# Patient Record
Sex: FEMALE | Race: WHITE | NOT HISPANIC OR LATINO | Employment: UNEMPLOYED | URBAN - METROPOLITAN AREA
[De-identification: names, ages, dates, MRNs, and addresses within clinical notes are randomized per-mention and may not be internally consistent; named-entity substitution may affect disease eponyms.]

---

## 2017-11-06 ENCOUNTER — HOSPITAL ENCOUNTER (OUTPATIENT)
Dept: RADIOLOGY | Facility: HOSPITAL | Age: 27
Discharge: HOME | End: 2017-11-06
Attending: INTERNAL MEDICINE
Payer: COMMERCIAL

## 2017-11-06 ENCOUNTER — PATIENT MESSAGE (OUTPATIENT)
Dept: RHEUMATOLOGY | Facility: CLINIC | Age: 27
End: 2017-11-06

## 2017-11-06 ENCOUNTER — OFFICE VISIT (OUTPATIENT)
Dept: RHEUMATOLOGY | Facility: CLINIC | Age: 27
End: 2017-11-06
Payer: COMMERCIAL

## 2017-11-06 VITALS
HEIGHT: 60 IN | SYSTOLIC BLOOD PRESSURE: 120 MMHG | BODY MASS INDEX: 29.86 KG/M2 | DIASTOLIC BLOOD PRESSURE: 80 MMHG | HEART RATE: 64 BPM | WEIGHT: 152.13 LBS

## 2017-11-06 DIAGNOSIS — M32.9 SYSTEMIC LUPUS ERYTHEMATOSUS, UNSPECIFIED SLE TYPE, UNSPECIFIED ORGAN INVOLVEMENT STATUS: Primary | ICD-10-CM

## 2017-11-06 DIAGNOSIS — M32.9 SYSTEMIC LUPUS ERYTHEMATOSUS, UNSPECIFIED SLE TYPE, UNSPECIFIED ORGAN INVOLVEMENT STATUS: ICD-10-CM

## 2017-11-06 PROCEDURE — 77077 JOINT SURVEY SINGLE VIEW: CPT | Mod: TC,PO

## 2017-11-06 PROCEDURE — 99205 OFFICE O/P NEW HI 60 MIN: CPT | Mod: S$GLB,,, | Performed by: INTERNAL MEDICINE

## 2017-11-06 PROCEDURE — 77077 JOINT SURVEY SINGLE VIEW: CPT | Mod: 26,,, | Performed by: RADIOLOGY

## 2017-11-06 PROCEDURE — 99999 PR PBB SHADOW E&M-EST. PATIENT-LVL III: CPT | Mod: PBBFAC,,, | Performed by: INTERNAL MEDICINE

## 2017-11-06 RX ORDER — HYDROXYCHLOROQUINE SULFATE 200 MG/1
400 TABLET, FILM COATED ORAL DAILY
Qty: 60 TABLET | Refills: 11 | Status: SHIPPED | OUTPATIENT
Start: 2017-11-06 | End: 2018-11-06

## 2017-11-06 NOTE — PROGRESS NOTES
Subjective:          Chief Complaint: Justine Melgoza is a 27 y.o. female who had concerns including Disease Management.    HPI:    She is a 27-year-old female she is past medical history for arthritis, lupus, migraines, hypothyroid she's had a surgery of the ankle right, knee right x3.  Subsequently had a BIO Replacement in 2013.  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 Hernandez in [place] is Ortho.     Previously seen by rheumatologist in [place]. Diagnosed 2013. Has been treated with hydroxychloroquine sinceDr. Alfredo in [place] about 2014.  Started with rashes, FUO. Joints are stiff 1st thing in the morning.   +PE unprovoked. +Livedo reticularis. +MTHFR gene but normal homocysteine. , negative APS with Rheum. No protein C and S, factor V abnormality  OBGYN-Dr. Baez Plan for Lovenox/heparin for any future pregnancy.   Dr. Owusu: Hematology      REVIEW OF SYSTEMS:    Review of Systems   Constitutional: Positive for malaise/fatigue. Negative for fever and weight loss.   HENT: Negative for sore throat.    Eyes: Negative for double vision, photophobia and redness.   Respiratory: Negative for cough, shortness of breath and wheezing.    Cardiovascular: Negative for chest pain, palpitations and orthopnea.   Gastrointestinal: Negative for abdominal pain, constipation and diarrhea.   Genitourinary: Negative for dysuria, hematuria and urgency.   Musculoskeletal: Positive for joint pain and myalgias. Negative for back pain.   Skin: Negative for rash.   Neurological: Negative for dizziness, tingling, focal weakness and headaches.   Endo/Heme/Allergies: Does not bruise/bleed easily.   Psychiatric/Behavioral: Negative for depression, hallucinations and suicidal ideas.               Objective:            Past Medical History:   Diagnosis Date    Arthritis     rhuematoid arthritis    Asthma     Hypertension     Lupus     Migraine headache     Thyroid disease     hypothyroid      Family History   Problem Relation Age of Onset    Diabetes Mother     Ovarian cancer Mother     Fibromyalgia Mother     Heart disease Father     Diabetes Father     Heart attack Father     Hyperlipidemia Father      Social History   Substance Use Topics    Smoking status: Never Smoker    Smokeless tobacco: Never Used    Alcohol use No         Current Outpatient Prescriptions on File Prior to Visit   Medication Sig Dispense Refill    albuterol-ipratropium 2.5mg-0.5mg/3mL (DUO-NEB) 0.5 mg-3 mg(2.5 mg base)/3 mL nebulizer solution Take 3 mLs by nebulization every 6 (six) hours as needed for Wheezing. Rescue 1 Box 0    aspirin (ECOTRIN) 81 MG EC tablet Take 81 mg by mouth once daily.       budesonide-formoterol 80-4.5 mcg (SYMBICORT) 80-4.5 mcg/actuation HFAA Inhale 2 puffs into the lungs once daily.       cholecalciferol, vitamin D3, 5,000 unit Tab Take 5,000 Units by mouth.       cyclobenzaprine (FLEXERIL) 10 MG tablet TAKE 1/2 TO 1 TABLET AT BEDTIME AS NEEDED FOR MUSCLE SPASM  1    fluticasone (FLONASE) 50 mcg/actuation nasal spray 1 spray by Each Nare route as needed.       hydroxychloroquine (PLAQUENIL) 200 mg tablet Take 400 mg by mouth once daily.       ketorolac (TORADOL) 10 mg tablet Take 1 tablet (10 mg total) by mouth every 6 (six) hours. 30 tablet 0    ondansetron (ZOFRAN-ODT) 4 MG TbDL DISSOLVE 1 TABLET (4 MG TOTAL) UNDER THE TONGUE EVERY 8 (EIGHT) HOURS AS NEEDED. 12 tablet 0    pantoprazole (PROTONIX) 40 MG tablet Take 1 tablet (40 mg total) by mouth once daily. 30 tablet 11    sucralfate (CARAFATE) 100 mg/mL suspension TAKE 10 MLS (1 GRAM TOTAL) BY MOUTH 4 (FOUR) TIMES DAILY WITH MEALS AND NIGHTLY. 414 mL 0    traMADol (ULTRAM) 50 mg tablet Take 1 tablet (50 mg total) by mouth every 6 (six) hours as needed. 60 tablet 0    [DISCONTINUED] ondansetron (ZOFRAN-ODT) 4 MG TbDL Take 1 tablet (4 mg total) by mouth every 6 (six) hours as needed. 12 tablet 0    [DISCONTINUED] SYNTHROID  50 mcg tablet TAKE 1 TABLET BY MOUTH EVERY MORNING BEFORE BREAKFAST 30 tablet 2     No current facility-administered medications on file prior to visit.        Vitals:    11/06/17 1139   BP: 120/80   Pulse: 64       Physical Exam:    Physical Exam   Constitutional: She appears well-developed and well-nourished.   HENT:   Nose: No septal deviation.   Mouth/Throat: Mucous membranes are normal. No oral lesions.   Eyes: Pupils are equal, round, and reactive to light. Right conjunctiva is not injected. Left conjunctiva is not injected.   Neck: No JVD present. No thyroid mass and no thyromegaly present.   Cardiovascular: Normal rate, regular rhythm and normal pulses.    No edema   Pulmonary/Chest: Effort normal and breath sounds normal.   Abdominal: Soft. Normal appearance. There is no hepatosplenomegaly.   Musculoskeletal:        Right shoulder: She exhibits normal range of motion, no tenderness and no swelling.        Left shoulder: She exhibits normal range of motion, no tenderness and no swelling.        Right elbow: She exhibits normal range of motion and no swelling. No tenderness found.        Left elbow: She exhibits normal range of motion and no swelling. No tenderness found.        Right wrist: She exhibits normal range of motion, no tenderness and no swelling.        Left wrist: She exhibits normal range of motion, no tenderness and no swelling.        Right hip: She exhibits normal range of motion, normal strength and no swelling.        Left hip: She exhibits normal range of motion, no tenderness and no swelling.        Right knee: She exhibits normal range of motion and no swelling. Tenderness found. Medial joint line tenderness noted.        Left knee: She exhibits decreased range of motion and swelling. Tenderness found. Medial joint line tenderness noted.        Right ankle: She exhibits normal range of motion and no swelling. No tenderness.        Left ankle: She exhibits normal range of motion and no  swelling. No tenderness.        Right hand: She exhibits decreased range of motion and tenderness. She exhibits no swelling.        Left hand: She exhibits decreased range of motion and tenderness. She exhibits no swelling.        Right foot: There is tenderness and swelling.        Left foot: There is tenderness and swelling.   5/5 upper and lower extremity bilateral muscle strength   Lymphadenopathy:     She has no cervical adenopathy.     She has no axillary adenopathy.   Neurological: She has normal strength and normal reflexes.   Skin: Skin is dry and intact.   Psychiatric: She has a normal mood and affect.             Assessment:       Encounter Diagnosis   Name Primary?    Systemic lupus erythematosus, unspecified SLE type, unspecified organ involvement status Yes          Plan:        Systemic lupus erythematosus, unspecified SLE type, unspecified organ involvement status  -     MADYSON; Future; Expected date: 11/06/2017  -     Anti Sm/RNP Antibody; Future; Expected date: 11/06/2017  -     Anti-DNA antibody, double-stranded; Future; Expected date: 11/06/2017  -     Anti-Histone Antibody; Future; Expected date: 11/06/2017  -     Anti-Smith antibody; Future; Expected date: 11/06/2017  -     C3 complement; Future; Expected date: 11/06/2017  -     C4 complement; Future; Expected date: 11/06/2017  -     Complement, total; Future; Expected date: 11/06/2017  -     Sjogrens syndrome-A extractable nuclear antibody; Future; Expected date: 11/06/2017  -     Sjogrens syndrome-B extractable nuclear antibody; Future; Expected date: 11/06/2017  -     Cardiolipin antibody; Future; Expected date: 11/06/2017  -     Beta-2 glycoprotein antibodies; Future; Expected date: 11/20/2017      Patient with hx of UCTD/SLE, +rash and joint swelling. Stable on HCQ 400mg in AM no maculopathy. +livedo, hx of malar rash, no nephritis.    -continue HCQ   -Update serologies.    -UTD on eye exams.      Discussed the risks benefits and side  effects of hydroxychloroquine including but not limited to retinal toxicity, rashes, nausea.  Patient is aware of the monitoring requirements of an annual eye exam will have this done following a trial to see if  tolerates the medication.    Return in about 3 months (around 2/6/2018).      Thank you for allowing me to participate in the care of this very pleasant patient.    60min consultation with greater than 50% spent in counseling, chart review and coordination of care. All questions answered.

## 2017-12-29 PROBLEM — N83.292 COMPLEX CYST OF LEFT OVARY: Status: ACTIVE | Noted: 2017-12-29

## 2018-07-31 PROBLEM — N80.9 DEEP INFILTRATIVE ENDOMETRIOSIS: Status: ACTIVE | Noted: 2018-07-31

## 2018-07-31 PROBLEM — N80.129 ENDOMETRIOMA OF OVARY: Status: ACTIVE | Noted: 2018-07-31

## 2019-02-07 ENCOUNTER — OFFICE VISIT (OUTPATIENT)
Dept: RHEUMATOLOGY | Facility: CLINIC | Age: 29
End: 2019-02-07
Payer: COMMERCIAL

## 2019-02-07 VITALS
DIASTOLIC BLOOD PRESSURE: 80 MMHG | BODY MASS INDEX: 30.76 KG/M2 | HEART RATE: 64 BPM | HEIGHT: 61 IN | SYSTOLIC BLOOD PRESSURE: 120 MMHG | WEIGHT: 162.94 LBS

## 2019-02-07 DIAGNOSIS — I26.99 PULMONARY THROMBOEMBOLISM: ICD-10-CM

## 2019-02-07 DIAGNOSIS — M32.19 SYSTEMIC LUPUS ERYTHEMATOSUS WITH OTHER ORGAN INVOLVEMENT, UNSPECIFIED SLE TYPE: Primary | ICD-10-CM

## 2019-02-07 DIAGNOSIS — M79.7 FIBROMYALGIA: ICD-10-CM

## 2019-02-07 PROCEDURE — 99215 OFFICE O/P EST HI 40 MIN: CPT | Mod: S$GLB,,, | Performed by: INTERNAL MEDICINE

## 2019-02-07 PROCEDURE — 99999 PR PBB SHADOW E&M-EST. PATIENT-LVL III: CPT | Mod: PBBFAC,,, | Performed by: INTERNAL MEDICINE

## 2019-02-07 PROCEDURE — 99215 PR OFFICE/OUTPT VISIT, EST, LEVL V, 40-54 MIN: ICD-10-PCS | Mod: S$GLB,,, | Performed by: INTERNAL MEDICINE

## 2019-02-07 PROCEDURE — 99999 PR PBB SHADOW E&M-EST. PATIENT-LVL III: ICD-10-PCS | Mod: PBBFAC,,, | Performed by: INTERNAL MEDICINE

## 2019-02-07 RX ORDER — HYDROXYCHLOROQUINE SULFATE 200 MG/1
200 TABLET, FILM COATED ORAL 2 TIMES DAILY
Qty: 60 TABLET | Refills: 11 | Status: SHIPPED | OUTPATIENT
Start: 2019-02-07 | End: 2020-02-07

## 2019-02-07 RX ORDER — PREGABALIN 25 MG/1
25 CAPSULE ORAL 2 TIMES DAILY
Qty: 60 CAPSULE | Refills: 6 | Status: SHIPPED | OUTPATIENT
Start: 2019-02-07 | End: 2020-08-07

## 2019-02-07 RX ORDER — HYDROXYCHLOROQUINE SULFATE 200 MG/1
200 TABLET, FILM COATED ORAL DAILY
COMMUNITY
End: 2019-02-07 | Stop reason: SDUPTHER

## 2019-02-07 NOTE — PROGRESS NOTES
Subjective:          Chief Complaint: Justine Akins is a 28 y.o. female who had concerns including Disease Management.    HPI:  Interval events:  Patient was lost to followup last visit with me was in 2017 nearly year and a few months.  She continues with Plaquenil once daily.  She has undergone I various surgeries for endometrial ablation in endometriosis she has had unilateral oophorectomy with Dr. Leon.  She is having trouble with her prior left knee replacement following with Dr. Ponce.   She has been on Lupron monthly for 3 months now with widespread MSK pain, myalgias.   Generalized body aches, cervicalgia, and arthralgias  No swelling that she can appreciate.   No rashes. Patient denies weight loss, rashes, dry eye, dry mouth, nasal or palatal ulcerations,  lymphadenopathy, Raynaud's, hx of DVT/miscarriages, psoriasis or family hx of psoriasis, rashes, serositis, anemia or other constitutional symptoms.          Given her history of a pulmonary thromboembolism she was referred to heme on for any possibilities of starting Lupron.  She does have a history of the MTHFR mutation.  Given this history she was started on a low-dose of Eliquis at 2.5 mg daily or Lovenox for Lupron therapy. Doing Lovenox. Now.   She is a 28-year-old female she is past medical history for arthritis, lupus, migraines, hypothyroid she's had a surgery of the ankle right, knee right x3.  Subsequently had a BIO Replacement in 2013.  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 Hernandez in [place] is Ortho.     Previously seen by rheumatologist in [place]. Diagnosed 2013. Has been treated with hydroxychloroquine sinceDr. Alfredo in [place] about 2014.  Started with rashes, FUO. Joints are stiff 1st thing in the morning.   +PE unprovoked. +Livedo reticularis. +MTHFR gene but normal homocysteine. , negative APS with Rheum. No protein C and S, factor V abnormality  OBGYN-Dr. Baez Plan for  Lovenox/heparin for any future pregnancy.         REVIEW OF SYSTEMS:    Review of Systems   Constitutional: Positive for malaise/fatigue. Negative for fever and weight loss.   HENT: Negative for sore throat.    Eyes: Negative for double vision, photophobia and redness.   Respiratory: Negative for cough, shortness of breath and wheezing.    Cardiovascular: Negative for chest pain, palpitations and orthopnea.   Gastrointestinal: Negative for abdominal pain, constipation and diarrhea.   Genitourinary: Negative for dysuria, hematuria and urgency.   Musculoskeletal: Positive for joint pain and myalgias. Negative for back pain.   Skin: Negative for rash.   Neurological: Negative for dizziness, tingling, focal weakness and headaches.   Endo/Heme/Allergies: Does not bruise/bleed easily.   Psychiatric/Behavioral: Negative for depression, hallucinations and suicidal ideas.               Objective:            Past Medical History:   Diagnosis Date    Anesthesia complication     pt states she needs a pediatric ET tube used due to past airway complication (trauma from tube)    Arthritis     rhuematoid arthritis, osteoarthritis    Asthma     Chronic nausea     GERD (gastroesophageal reflux disease)     History of kidney stones     Hypertension     Lupus     Migraine headache     MTHFR mutation     Ovarian cyst     Panic attack as reaction to stress     Pulmonary embolism 2015    Respiratory distress     Thyroid disease     hypothyroid     Family History   Problem Relation Age of Onset    Diabetes Mother     Ovarian cancer Mother     Fibromyalgia Mother     Heart disease Father     Diabetes Father     Heart attack Father     Hyperlipidemia Father      Social History     Tobacco Use    Smoking status: Never Smoker    Smokeless tobacco: Never Used   Substance Use Topics    Alcohol use: No    Drug use: No         Current Outpatient Medications on File Prior to Visit   Medication Sig Dispense Refill     budesonide (PULMICORT) 0.5 mg/2 mL nebulizer solution Take 2 mLs (0.5 mg total) by nebulization once daily. Controller  0    cholecalciferol, vitamin D3, 5,000 unit Tab Take 5,000 Units by mouth once daily.       cyclobenzaprine (FLEXERIL) 10 MG tablet TAKE 1/2 TO 1 TABLET AT BEDTIME AS NEEDED FOR MUSCLE SPASM 30 tablet 1    fish oil-omega-3 fatty acids 300-1,000 mg capsule Take by mouth once daily.      fluticasone (FLONASE) 50 mcg/actuation nasal spray 1 spray (50 mcg total) by Each Nare route as needed. 16 g 0    hydroxychloroquine (PLAQUENIL) 200 mg tablet Take 200 mg by mouth once daily.      LUPRON DEPOT 3.75 mg injection inject INTRA-MUSCULARLY once a month  0    oxyCODONE-acetaminophen (PERCOCET) 5-325 mg per tablet Take 1 tablet by mouth every 8 (eight) hours as needed for Pain. 9 tablet 0    prenatal64-iron-Lmfolate-algal (NEEVODHA, WITH ALGAL OIL,) 27 mg iron-1.13 mg-581.92 mg Cap Take 1 capsule by mouth Daily. 90 capsule 3    SYMBICORT 160-4.5 mcg/actuation HFAA INHALE 2 PUFFS INTO THE LUNGS 2 TIMES DAILY. 10.2 Inhaler 2    SYNTHROID 50 mcg tablet Take 1 tablet (50 mcg total) by mouth every morning. Needs thyroid labs checked 30 tablet 0    albuterol-ipratropium (DUO-NEB) 2.5 mg-0.5 mg/3 mL nebulizer solution Take 3 mLs by nebulization every 6 (six) hours as needed for Wheezing. Rescue 1 Box 2    CARAFATE 100 mg/mL suspension TAKE 10 MLS (1 GRAM TOTAL) BY MOUTH 4 (FOUR) TIMES DAILY WITH MEALS AND NIGHTLY. (Patient taking differently: TAKE 10 MLS (1 GRAM TOTAL) BY MOUTH 4 (FOUR) TIMES DAILY WITH MEALS AND NIGHTLY. PRN) 414 mL 0    hydroCHLOROthiazide (HYDRODIURIL) 25 MG tablet TAKE 1 TABLET (25 MG TOTAL) BY MOUTH ONCE DAILY. (Patient taking differently: Take 25 mg by mouth daily as needed. ) 30 tablet 11    ipratropium (ATROVENT) 42 mcg (0.06 %) nasal spray 2 sprays by Nasal route 3 (three) times daily. 15 mL 0    ondansetron (ZOFRAN-ODT) 4 MG TbDL DISSOLVE 1 TABLET (4 MG TOTAL) UNDER THE  TONGUE EVERY 8 (EIGHT) HOURS AS NEEDED. 12 tablet 0    pantoprazole (PROTONIX) 40 MG tablet Take 1 tablet (40 mg total) by mouth once daily. (Patient taking differently: Take 40 mg by mouth daily as needed. ) 30 tablet 11    traMADol (ULTRAM) 50 mg tablet Take 50 mg by mouth every 6 (six) hours.  0    [DISCONTINUED] aspirin (ECOTRIN) 81 MG EC tablet Take 81 mg by mouth once daily.       [DISCONTINUED] ibuprofen (ADVIL,MOTRIN) 800 MG tablet 1 TABLET BY MOUTH EVERY 8 HOURS AS NEEDED PAIN  0     No current facility-administered medications on file prior to visit.        Vitals:    02/07/19 1544   BP: 120/80   Pulse: 64       Physical Exam:    Physical Exam   Constitutional: She appears well-developed and well-nourished.   HENT:   Nose: No septal deviation.   Mouth/Throat: Mucous membranes are normal. No oral lesions.   Eyes: Pupils are equal, round, and reactive to light. Right conjunctiva is not injected. Left conjunctiva is not injected.   Neck: No JVD present. No thyroid mass and no thyromegaly present.   Cardiovascular: Normal rate, regular rhythm and normal pulses.   No edema   Pulmonary/Chest: Effort normal and breath sounds normal.   Abdominal: Soft. Normal appearance. There is no hepatosplenomegaly.   Musculoskeletal:        Right shoulder: She exhibits normal range of motion, no tenderness and no swelling.        Left shoulder: She exhibits normal range of motion, no tenderness and no swelling.        Right elbow: She exhibits normal range of motion and no swelling. No tenderness found.        Left elbow: She exhibits normal range of motion and no swelling. No tenderness found.        Right wrist: She exhibits normal range of motion, no tenderness and no swelling.        Left wrist: She exhibits normal range of motion, no tenderness and no swelling.        Right hip: She exhibits normal range of motion, normal strength and no swelling.        Left hip: She exhibits normal range of motion, no tenderness and  no swelling.        Right knee: She exhibits normal range of motion and no swelling. Tenderness found. Medial joint line tenderness noted.        Left knee: She exhibits decreased range of motion and swelling. Tenderness found. Medial joint line tenderness noted.        Right ankle: She exhibits normal range of motion and no swelling. No tenderness.        Left ankle: She exhibits normal range of motion and no swelling. No tenderness.        Right hand: She exhibits decreased range of motion and tenderness. She exhibits no swelling.        Left hand: She exhibits decreased range of motion and tenderness. She exhibits no swelling.        Right foot: There is tenderness and swelling.        Left foot: There is tenderness and swelling.   5/5 upper and lower extremity bilateral muscle strength   Lymphadenopathy:     She has no cervical adenopathy.     She has no axillary adenopathy.   Neurological: She has normal strength and normal reflexes.   Skin: Skin is dry and intact.   Psychiatric: She has a normal mood and affect.             Assessment:       Encounter Diagnoses   Name Primary?    Systemic lupus erythematosus with other organ involvement, unspecified SLE type Yes    Fibromyalgia     Pulmonary thromboembolism           Plan:        Systemic lupus erythematosus with other organ involvement, unspecified SLE type  -     pregabalin (LYRICA) 25 MG capsule; Take 1 capsule (25 mg total) by mouth 2 (two) times daily.  Dispense: 60 capsule; Refill: 6  -     hydroxychloroquine (PLAQUENIL) 200 mg tablet; Take 1 tablet (200 mg total) by mouth 2 (two) times daily.  Dispense: 60 tablet; Refill: 11  -     MADYSON Screen w/Reflex; Future; Expected date: 02/07/2019  -     Anti Sm/RNP Antibody; Future; Expected date: 02/07/2019  -     Anti-DNA antibody, double-stranded; Future; Expected date: 02/07/2019  -     Anti-Histone Antibody; Future; Expected date: 02/07/2019  -     Anti-Smith antibody; Future; Expected date: 02/07/2019  -      C3 complement; Future; Expected date: 02/07/2019  -     C4 complement; Future; Expected date: 02/07/2019  -     Sjogrens syndrome-A extractable nuclear antibody; Future; Expected date: 02/07/2019  -     Sjogrens syndrome-B extractable nuclear antibody; Future; Expected date: 02/07/2019  -     Complement, total; Future; Expected date: 02/07/2019  -     Sedimentation rate; Standing; Expected date: 02/07/2019  -     C-reactive protein; Standing; Expected date: 02/07/2019    Fibromyalgia  -     pregabalin (LYRICA) 25 MG capsule; Take 1 capsule (25 mg total) by mouth 2 (two) times daily.  Dispense: 60 capsule; Refill: 6  -     hydroxychloroquine (PLAQUENIL) 200 mg tablet; Take 1 tablet (200 mg total) by mouth 2 (two) times daily.  Dispense: 60 tablet; Refill: 11    Pulmonary thromboembolism      Patient with hx of UCTD/SLE, +rash and joint swelling. Stable on HCQ 400mg in AM no maculopathy. +livedo, hx of malar rash, no nephritis.    -continue HCQ   -Update serologies.    -UTD on eye exams.    She may be having Lupron induced arthralgias and myalgias discussed trial with Lyrica while on therapy.     Likely EDS type 1/benign joint hypermobility: Reji 8/9       Discussed the risks benefits and side effects of hydroxychloroquine including but not limited to retinal toxicity, rashes, nausea.  Patient is aware of the monitoring requirements of an annual eye exam will have this done following a trial to see if  tolerates the medication.    Follow-up in about 4 months (around 6/7/2019).      Thank you for allowing me to participate in the care of this very pleasant patient.    45min consultation with greater than 50% spent in counseling, chart review and coordination of care. All questions answered.

## 2019-07-01 ENCOUNTER — OFFICE VISIT (OUTPATIENT)
Dept: RHEUMATOLOGY | Facility: CLINIC | Age: 29
End: 2019-07-01
Payer: COMMERCIAL

## 2019-07-01 VITALS
BODY MASS INDEX: 30.88 KG/M2 | SYSTOLIC BLOOD PRESSURE: 132 MMHG | HEIGHT: 61 IN | WEIGHT: 163.56 LBS | DIASTOLIC BLOOD PRESSURE: 93 MMHG | HEART RATE: 104 BPM

## 2019-07-01 DIAGNOSIS — M32.19 SYSTEMIC LUPUS ERYTHEMATOSUS WITH OTHER ORGAN INVOLVEMENT, UNSPECIFIED SLE TYPE: Primary | ICD-10-CM

## 2019-07-01 DIAGNOSIS — M79.7 FIBROMYALGIA: ICD-10-CM

## 2019-07-01 PROCEDURE — 99999 PR PBB SHADOW E&M-EST. PATIENT-LVL III: CPT | Mod: PBBFAC,,, | Performed by: INTERNAL MEDICINE

## 2019-07-01 PROCEDURE — 99999 PR PBB SHADOW E&M-EST. PATIENT-LVL III: ICD-10-PCS | Mod: PBBFAC,,, | Performed by: INTERNAL MEDICINE

## 2019-07-01 PROCEDURE — 99214 OFFICE O/P EST MOD 30 MIN: CPT | Mod: S$GLB,,, | Performed by: INTERNAL MEDICINE

## 2019-07-01 PROCEDURE — 99214 PR OFFICE/OUTPT VISIT, EST, LEVL IV, 30-39 MIN: ICD-10-PCS | Mod: S$GLB,,, | Performed by: INTERNAL MEDICINE

## 2019-07-01 ASSESSMENT — ROUTINE ASSESSMENT OF PATIENT INDEX DATA (RAPID3)
PAIN SCORE: 5.5
PATIENT GLOBAL ASSESSMENT SCORE: 4.5
PSYCHOLOGICAL DISTRESS SCORE: 4.4
MDHAQ FUNCTION SCORE: .8
TOTAL RAPID3 SCORE: 4.22

## 2019-07-01 NOTE — PROGRESS NOTES
Subjective:          Chief Complaint: Justine Akins is a 28 y.o. female who had concerns including Lupus (4 month).    HPI:  Interval events:  Patient was lost to followup, now here at her 4 month follow-up from [facility].    2/18/19 She continues with Plaquenil twice daily.    She has undergone  various surgeries for endometrial ablation in endometriosis she has had unilateral oophorectomy with Dr. Leon.   Patient having epigastric pain.    She is having trouble with her prior left knee replacement following with Dr. Ponce.   She has been on Lupron monthly for 6 months now with widespread MSK pain, myalgias.   Generalized body aches, cervicalgia, and arthralgias  No swelling that she can appreciate.   No rashes. Patient denies weight loss, rashes, dry eye, dry mouth, nasal or palatal ulcerations,  lymphadenopathy, Raynaud's, hx of DVT/miscarriages, psoriasis or family hx of psoriasis, rashes, serositis, anemia or other constitutional symptoms.      Given her history of a pulmonary thromboembolism she was referred to heme on for any possibilities of starting Lupron.  She does have a history of the MTHFR mutation.  Given this history she was started on a low-dose of Eliquis at 2.5 mg daily or Lovenox for Lupron therapy. Doing Lovenox. Now.   She is a 28-year-old female she is past medical history for arthritis, lupus, migraines, hypothyroid she's had a surgery of the ankle right, knee right x3.  Subsequently had a BIO Replacement in 2013.  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 Hernandez in [place] is Ortho.     Previously seen by rheumatologist in [place]. Diagnosed 2013. Has been treated with hydroxychloroquine sinceDr. Alfredo in [place] about 2014.  Started with rashes, FUO. Joints are stiff 1st thing in the morning.   +PE unprovoked. +Livedo reticularis. +MTHFR gene but normal homocysteine. , negative APS with Rheum. No protein C and S, factor V  abnormality  OBGYROSEANNA-Dr. Baez Plan for Lovenox/heparin for any future pregnancy.     Component      Latest Ref Rng & Units 2019           1:55 PM  1:55 PM   Zamora (YVONNE) Ab, IgG      0 - 40 AU/mL 0 0   RNP Antibody      0 - 40 AU/mL  1   SSA Antibody      0 - 40 AU/mL  7   SSA 60 (Ro) YVONNE Antibody      0 - 40 AU/mL  1   MADYSON Screen      None Detected  None Detected   Anti-Histone Antibody      0.0 - 0.9 Units  0.2   Complement (C-3)      50 - 180 mg/dL  138   Complement (C-4)      11 - 44 mg/dL  35   SSB Antibody      0 - 40 AU/mL  0   Compl, Total (CH50)      60 - 144  Units  108   Sed Rate      0 - 19 mm/Hr  12   CRP      0.00 - 0.90 mg/dL  1.00 (H)   Anti-dsDNA Ab by IFA      <1:10  <1:10     REVIEW OF SYSTEMS:    Review of Systems   Constitutional: Positive for malaise/fatigue. Negative for fever and weight loss.   HENT: Negative for sore throat.    Eyes: Negative for double vision, photophobia and redness.   Respiratory: Negative for cough, shortness of breath and wheezing.    Cardiovascular: Negative for chest pain, palpitations and orthopnea.   Gastrointestinal: Negative for abdominal pain, constipation and diarrhea.   Genitourinary: Negative for dysuria, hematuria and urgency.   Musculoskeletal: Positive for joint pain and myalgias. Negative for back pain.   Skin: Negative for rash.   Neurological: Negative for dizziness, tingling, focal weakness and headaches.   Endo/Heme/Allergies: Does not bruise/bleed easily.   Psychiatric/Behavioral: Negative for depression, hallucinations and suicidal ideas.               Objective:            Past Medical History:   Diagnosis Date    Anesthesia complication     pt states she needs a pediatric ET tube used due to past airway complication (trauma from tube)    Arthritis     rhuematoid arthritis, osteoarthritis    Asthma     Chronic nausea     GERD (gastroesophageal reflux disease)     History of kidney stones     Hypertension     Lupus      Migraine headache     MTHFR mutation     Ovarian cyst     Panic attack as reaction to stress     Pulmonary embolism 2015    Respiratory distress     Thyroid disease     hypothyroid     Family History   Problem Relation Age of Onset    Diabetes Mother     Ovarian cancer Mother     Fibromyalgia Mother     Heart disease Father     Diabetes Father     Heart attack Father     Hyperlipidemia Father      Social History     Tobacco Use    Smoking status: Never Smoker    Smokeless tobacco: Never Used   Substance Use Topics    Alcohol use: No    Drug use: No         Current Outpatient Medications on File Prior to Visit   Medication Sig Dispense Refill    albuterol-ipratropium (DUO-NEB) 2.5 mg-0.5 mg/3 mL nebulizer solution Take 3 mLs by nebulization every 6 (six) hours as needed for Wheezing. Rescue 1 Box 2    budesonide (PULMICORT) 0.5 mg/2 mL nebulizer solution Take 2 mLs (0.5 mg total) by nebulization once daily. Controller  0    cholecalciferol, vitamin D3, 5,000 unit Tab Take 5,000 Units by mouth once daily.       cyclobenzaprine (FLEXERIL) 10 MG tablet TAKE 1/2 TO 1 TABLET AT BEDTIME AS NEEDED FOR MUSCLE SPASM 30 tablet 1    enoxaparin (LOVENOX) 150 mg/mL Syrg Inject 150 mg into the skin once daily.      fexofenadine-pseudoephedrine  mg (ALLEGRA-D)  mg per tablet Take 1 tablet by mouth 2 (two) times daily. for 15 days 30 tablet 0    fish oil-omega-3 fatty acids 300-1,000 mg capsule Take by mouth once daily.      fluticasone (FLONASE) 50 mcg/actuation nasal spray 1 spray (50 mcg total) by Each Nare route as needed. 16 g 0    hydroxychloroquine (PLAQUENIL) 200 mg tablet Take 1 tablet (200 mg total) by mouth 2 (two) times daily. 60 tablet 11    ipratropium (ATROVENT) 42 mcg (0.06 %) nasal spray 2 sprays by Nasal route 3 (three) times daily. 15 mL 0    LUPRON DEPOT 3.75 mg injection inject INTRA-MUSCULARLY once a month  0    ondansetron (ZOFRAN-ODT) 4 MG TbDL DISSOLVE 1 TABLET (4 MG  TOTAL) UNDER THE TONGUE EVERY 8 (EIGHT) HOURS AS NEEDED. 12 tablet 0    oxyCODONE-acetaminophen (PERCOCET) 5-325 mg per tablet Take 1 tablet by mouth every 8 (eight) hours as needed for Pain. 9 tablet 0    pregabalin (LYRICA) 25 MG capsule Take 1 capsule (25 mg total) by mouth 2 (two) times daily. 60 capsule 6    prenatal64-iron-Lmfolate-algal (NEEVODHA, WITH ALGAL OIL,) 27 mg iron-1.13 mg-581.92 mg Cap Take 1 capsule by mouth Daily. 90 capsule 3    SYMBICORT 160-4.5 mcg/actuation HFAA INHALE 2 PUFFS INTO THE LUNGS 2 TIMES DAILY. 10.2 Inhaler 2    SYNTHROID 50 mcg tablet TAKE 1 TABLET (50 MCG TOTAL) BY MOUTH EVERY MORNING. NEEDS THYROID LABS CHECKED 30 tablet 2    traMADol (ULTRAM) 50 mg tablet Take 50 mg by mouth every 6 (six) hours.  0    pantoprazole (PROTONIX) 40 MG tablet Take 1 tablet (40 mg total) by mouth once daily. (Patient taking differently: Take 40 mg by mouth daily as needed. ) 30 tablet 11    [DISCONTINUED] CARAFATE 100 mg/mL suspension TAKE 10 MLS (1 GRAM TOTAL) BY MOUTH 4 (FOUR) TIMES DAILY WITH MEALS AND NIGHTLY. (Patient taking differently: TAKE 10 MLS (1 GRAM TOTAL) BY MOUTH 4 (FOUR) TIMES DAILY WITH MEALS AND NIGHTLY. PRN) 414 mL 0     No current facility-administered medications on file prior to visit.        Vitals:    07/01/19 1015   BP: (!) 132/93   Pulse: 104       Physical Exam:    Physical Exam   Constitutional: She appears well-developed and well-nourished.   HENT:   Nose: No septal deviation.   Mouth/Throat: Mucous membranes are normal. No oral lesions.   Eyes: Pupils are equal, round, and reactive to light. Right conjunctiva is not injected. Left conjunctiva is not injected.   Neck: No JVD present. No thyroid mass and no thyromegaly present.   Cardiovascular: Normal rate, regular rhythm and normal pulses.   No edema   Pulmonary/Chest: Effort normal and breath sounds normal.   Abdominal: Soft. Normal appearance. There is no hepatosplenomegaly.   Musculoskeletal:        Right  shoulder: She exhibits normal range of motion, no tenderness and no swelling.        Left shoulder: She exhibits normal range of motion, no tenderness and no swelling.        Right elbow: She exhibits normal range of motion and no swelling. No tenderness found.        Left elbow: She exhibits normal range of motion and no swelling. No tenderness found.        Right wrist: She exhibits normal range of motion, no tenderness and no swelling.        Left wrist: She exhibits normal range of motion, no tenderness and no swelling.        Right hip: She exhibits normal range of motion, normal strength and no swelling.        Left hip: She exhibits normal range of motion, no tenderness and no swelling.        Right knee: She exhibits normal range of motion and no swelling. Tenderness found. Medial joint line tenderness noted.        Left knee: She exhibits decreased range of motion and swelling. Tenderness found. Medial joint line tenderness noted.        Right ankle: She exhibits normal range of motion and no swelling. No tenderness.        Left ankle: She exhibits normal range of motion and no swelling. No tenderness.        Right hand: She exhibits decreased range of motion and tenderness. She exhibits no swelling.        Left hand: She exhibits decreased range of motion and tenderness. She exhibits no swelling.        Right foot: There is tenderness and swelling.        Left foot: There is tenderness and swelling.   5/5 upper and lower extremity bilateral muscle strength   Lymphadenopathy:     She has no cervical adenopathy.     She has no axillary adenopathy.   Neurological: She has normal strength and normal reflexes.   Skin: Skin is dry and intact.   Psychiatric: She has a normal mood and affect.             Assessment:       Encounter Diagnoses   Name Primary?    Systemic lupus erythematosus with other organ involvement, unspecified SLE type Yes    Fibromyalgia           Plan:        Systemic lupus erythematosus  with other organ involvement, unspecified SLE type  -     C-reactive protein; Future; Expected date: 07/01/2019  -     C4 complement; Future; Expected date: 07/01/2019  -     Anti-DNA antibody, double-stranded; Future; Expected date: 07/01/2019  -     C3 complement; Future; Expected date: 07/01/2019  -     Sedimentation rate; Future; Expected date: 07/01/2019  -     CBC auto differential; Future; Expected date: 07/01/2019  -     Comprehensive metabolic panel; Future; Expected date: 07/01/2019    Fibromyalgia      Patient with hx of UCTD/SLE, +rash and joint swelling. Stable on HCQ 400mg in AM no maculopathy. +livedo, hx of malar rash, no nephritis.    -continue HCQ   -Update serologies.    -UTD on eye exams.    She may be having Lupron induced arthralgias and myalgias   Continue Lyrica 25mg BID.     Likely EDS type 1/benign joint hypermobility: [place] 8/9       Discussed the risks benefits and side effects of hydroxychloroquine including but not limited to retinal toxicity, rashes, nausea.  Patient is aware of the monitoring requirements of an annual eye exam will have this done following a trial to see if  tolerates the medication.    Follow up in about 4 months (around 11/1/2019).      Thank you for allowing me to participate in the care of this very pleasant patient.    45min consultation with greater than 50% spent in counseling, chart review and coordination of care. All questions answered.

## 2019-11-25 ENCOUNTER — OFFICE VISIT (OUTPATIENT)
Dept: RHEUMATOLOGY | Facility: CLINIC | Age: 29
End: 2019-11-25
Payer: COMMERCIAL

## 2019-11-25 ENCOUNTER — TELEPHONE (OUTPATIENT)
Dept: RHEUMATOLOGY | Facility: CLINIC | Age: 29
End: 2019-11-25

## 2019-11-25 VITALS
SYSTOLIC BLOOD PRESSURE: 123 MMHG | WEIGHT: 164.13 LBS | HEART RATE: 79 BPM | DIASTOLIC BLOOD PRESSURE: 80 MMHG | BODY MASS INDEX: 30.99 KG/M2 | HEIGHT: 61 IN

## 2019-11-25 DIAGNOSIS — M79.7 FIBROMYALGIA: ICD-10-CM

## 2019-11-25 DIAGNOSIS — M53.3 SACROILIAC JOINT PAIN: ICD-10-CM

## 2019-11-25 DIAGNOSIS — M54.16 LUMBAR RADICULITIS: ICD-10-CM

## 2019-11-25 DIAGNOSIS — I26.99 PULMONARY THROMBOEMBOLISM: ICD-10-CM

## 2019-11-25 DIAGNOSIS — R53.83 FATIGUE, UNSPECIFIED TYPE: ICD-10-CM

## 2019-11-25 DIAGNOSIS — M32.19 OTHER SYSTEMIC LUPUS ERYTHEMATOSUS WITH OTHER ORGAN INVOLVEMENT: Primary | ICD-10-CM

## 2019-11-25 PROCEDURE — 99215 OFFICE O/P EST HI 40 MIN: CPT | Mod: S$GLB,,, | Performed by: INTERNAL MEDICINE

## 2019-11-25 PROCEDURE — 3079F DIAST BP 80-89 MM HG: CPT | Mod: CPTII,S$GLB,, | Performed by: INTERNAL MEDICINE

## 2019-11-25 PROCEDURE — 99215 PR OFFICE/OUTPT VISIT, EST, LEVL V, 40-54 MIN: ICD-10-PCS | Mod: S$GLB,,, | Performed by: INTERNAL MEDICINE

## 2019-11-25 PROCEDURE — 99999 PR PBB SHADOW E&M-EST. PATIENT-LVL III: CPT | Mod: PBBFAC,,, | Performed by: INTERNAL MEDICINE

## 2019-11-25 PROCEDURE — 99999 PR PBB SHADOW E&M-EST. PATIENT-LVL III: ICD-10-PCS | Mod: PBBFAC,,, | Performed by: INTERNAL MEDICINE

## 2019-11-25 PROCEDURE — 3079F PR MOST RECENT DIASTOLIC BLOOD PRESSURE 80-89 MM HG: ICD-10-PCS | Mod: CPTII,S$GLB,, | Performed by: INTERNAL MEDICINE

## 2019-11-25 PROCEDURE — 3008F BODY MASS INDEX DOCD: CPT | Mod: CPTII,S$GLB,, | Performed by: INTERNAL MEDICINE

## 2019-11-25 PROCEDURE — 3008F PR BODY MASS INDEX (BMI) DOCUMENTED: ICD-10-PCS | Mod: CPTII,S$GLB,, | Performed by: INTERNAL MEDICINE

## 2019-11-25 PROCEDURE — 3074F PR MOST RECENT SYSTOLIC BLOOD PRESSURE < 130 MM HG: ICD-10-PCS | Mod: CPTII,S$GLB,, | Performed by: INTERNAL MEDICINE

## 2019-11-25 PROCEDURE — 3074F SYST BP LT 130 MM HG: CPT | Mod: CPTII,S$GLB,, | Performed by: INTERNAL MEDICINE

## 2019-11-25 ASSESSMENT — ROUTINE ASSESSMENT OF PATIENT INDEX DATA (RAPID3)
MDHAQ FUNCTION SCORE: .9
PAIN SCORE: 5.5
PSYCHOLOGICAL DISTRESS SCORE: 1.1
TOTAL RAPID3 SCORE: 4.5
PATIENT GLOBAL ASSESSMENT SCORE: 5

## 2019-11-25 NOTE — PROGRESS NOTES
"Subjective:          Chief Complaint: Justine Akins is a 29 y.o. female who had concerns including Follow-up (4 month f/u).    HPI:    She is a 28-year-old female she is past medical history for arthritis, lupus,FMS migraines, hypothyroid she's had a surgery of the ankle right, knee right x3.  Subsequently had a BIO Replacement in 2013.  Previously seen by rheumatologist in [place]. Diagnosed 2013. Has been treated with hydroxychloroquine [place] about [place].  Started with rashes, FUO. Joints are stiff 1st thing in the morning.   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 Hernandez in [place] is Ortho.   SLE: +MADYSON in past. +PE unprovoked. +Livedo reticularis. +MTHFR gene but normal homocysteine. , negative APS with Rheum. No protein C and S, factor V abnormality  She continues with Plaquenil 400mg once daily  She has undergone  various surgeries for endometrial ablation in endometriosis she has had unilateral oophorectomy with Dr. Leon. Off Lupron approx 7/2019. Has had 2 periods since and having increased menstrual pain. She did have CT Abdomen. Pending eval with endometriosis specialist.   Patient with +LBP notes severe, exacerbated with standing or sitting for prolonged positions. Non inflammatory  C/o "legs vibrating" no specific pattern of standing or sitting.   Cold sensation but feet w/o cyanosis/pallor.   RLS at night. Heel pain   ++fatigue.   Notes she has to stay hydrated or she develops some spots in her vision. Recent episode with bradycardia and now tachycardia.   Failed: elavil, nortryptylline and gabapentin all with ASE of feeling could not breathe.   Did not tolerate Cymbalta felt altered.   No effexor   She stopped Lyrica 25mg BID last spring with similar SOB feeling.        She is having trouble with her prior left knee replacement following with Dr. Ponce.   She has been on Lupron monthly for 6 months now with widespread MSK pain, " myalgias.  Now off.   Generalized body aches, cervicalgia, and arthralgias  No swelling that she can appreciate.   No rashes. Patient denies weight loss, rashes, dry eye, dry mouth, nasal or palatal ulcerations,  lymphadenopathy, Raynaud's, hx of DVT/miscarriages, psoriasis or family hx of psoriasis, rashes, serositis, anemia or other constitutional symptoms.  She does have a history of the MTHFR mutation.      OBGYN-Dr. Baez Plan for Lovenox/heparin for any future pregnancy.     Component      Latest Ref Rng & Units 2019           1:55 PM  1:55 PM   Zamora (YVONNE) Ab, IgG      0 - 40 AU/mL 0 0   RNP Antibody      0 - 40 AU/mL  1   SSA Antibody      0 - 40 AU/mL  7   SSA 60 (Ro) YVONNE Antibody      0 - 40 AU/mL  1   MADYSON Screen      None Detected  None Detected   Anti-Histone Antibody      0.0 - 0.9 Units  0.2   Complement (C-3)      50 - 180 mg/dL  138   Complement (C-4)      11 - 44 mg/dL  35   SSB Antibody      0 - 40 AU/mL  0   Compl, Total (CH50)      60 - 144  Units  108   Sed Rate      0 - 19 mm/Hr  12   CRP      0.00 - 0.90 mg/dL  1.00 (H)   Anti-dsDNA Ab by IFA      <1:10  <1:10     REVIEW OF SYSTEMS:    Review of Systems   Constitutional: Positive for malaise/fatigue. Negative for fever and weight loss.   HENT: Negative for sore throat.    Eyes: Negative for double vision, photophobia and redness.   Respiratory: Negative for cough, shortness of breath and wheezing.    Cardiovascular: Negative for chest pain, palpitations and orthopnea.   Gastrointestinal: Negative for abdominal pain, constipation and diarrhea.   Genitourinary: Negative for dysuria, hematuria and urgency.   Musculoskeletal: Positive for joint pain and myalgias. Negative for back pain.   Skin: Negative for rash.   Neurological: Negative for dizziness, tingling, focal weakness and headaches.   Endo/Heme/Allergies: Does not bruise/bleed easily.   Psychiatric/Behavioral: Negative for depression, hallucinations and suicidal ideas.                Objective:            Past Medical History:   Diagnosis Date    Anesthesia complication     pt states she needs a pediatric ET tube used due to past airway complication (trauma from tube)    Arthritis     rhuematoid arthritis, osteoarthritis    Asthma     Chronic nausea     GERD (gastroesophageal reflux disease)     History of kidney stones     Hypertension     Lupus     Migraine headache     MTHFR mutation     Ovarian cyst     Panic attack as reaction to stress     Pulmonary embolism 2015    Respiratory distress     Thyroid disease     hypothyroid     Family History   Problem Relation Age of Onset    Diabetes Mother     Ovarian cancer Mother     Fibromyalgia Mother     Heart disease Father     Diabetes Father     Heart attack Father     Hyperlipidemia Father      Social History     Tobacco Use    Smoking status: Never Smoker    Smokeless tobacco: Never Used   Substance Use Topics    Alcohol use: No    Drug use: No         Current Outpatient Medications on File Prior to Visit   Medication Sig Dispense Refill    albuterol-ipratropium (DUO-NEB) 2.5 mg-0.5 mg/3 mL nebulizer solution Take 3 mLs by nebulization every 6 (six) hours as needed for Wheezing. Rescue 1 Box 2    budesonide (PULMICORT) 0.5 mg/2 mL nebulizer solution Take 2 mLs (0.5 mg total) by nebulization once daily. Controller  0    cholecalciferol, vitamin D3, 5,000 unit Tab Take 5,000 Units by mouth once daily.       cyclobenzaprine (FLEXERIL) 10 MG tablet One po daily 30 tablet 1    fish oil-omega-3 fatty acids 300-1,000 mg capsule Take by mouth once daily.      fluticasone (FLONASE) 50 mcg/actuation nasal spray 1 spray (50 mcg total) by Each Nare route as needed. 16 g 0    hydroxychloroquine (PLAQUENIL) 200 mg tablet Take 1 tablet (200 mg total) by mouth 2 (two) times daily. 60 tablet 11    ipratropium (ATROVENT) 42 mcg (0.06 %) nasal spray 2 sprays by Nasal route 3 (three) times daily. 15 mL 0     ondansetron (ZOFRAN-ODT) 4 MG TbDL DISSOLVE 1 TABLET (4 MG TOTAL) UNDER THE TONGUE EVERY 8 (EIGHT) HOURS AS NEEDED. 30 tablet 0    oxyCODONE-acetaminophen (PERCOCET) 5-325 mg per tablet Take 1 tablet by mouth every 8 (eight) hours as needed for Pain. 9 tablet 0    pantoprazole (PROTONIX) 40 MG tablet Take 1 tablet (40 mg total) by mouth once daily. (Patient taking differently: Take 40 mg by mouth daily as needed. ) 30 tablet 11    prenatal64-iron-Lmfolate-algal (NEEVODHA, WITH ALGAL OIL,) 27 mg iron-1.13 mg-581.92 mg Cap Take 1 capsule by mouth Daily. 90 capsule 3    SYMBICORT 160-4.5 mcg/actuation HFAA INHALE 2 PUFFS INTO THE LUNGS 2 TIMES DAILY. 10.2 Inhaler 2    SYNTHROID 50 mcg tablet TAKE 1 TABLET (50 MCG TOTAL) BY MOUTH EVERY MORNING. NEEDS THYROID LABS CHECKED 30 tablet 2    traMADol (ULTRAM) 50 mg tablet Take 50 mg by mouth every 6 (six) hours.  0    enoxaparin (LOVENOX) 150 mg/mL Syrg Inject 150 mg into the skin once daily.      enoxaparin (LOVENOX) 40 mg/0.4 mL Syrg inject 40 mg into the skin once daily (Patient not taking: Reported on 9/23/2019) 12 mL 0    guaiFENesin (MUCINEX) 600 mg 12 hr tablet Take 1 tablet (600 mg total) by mouth 2 (two) times daily. (Patient not taking: Reported on 11/25/2019) 60 tablet 2    guaifenesin-codeine 100-10 mg/5 ml (TUSSI-ORGANIDIN NR)  mg/5 mL syrup Take 5 mLs by mouth nightly as needed. (Patient not taking: Reported on 11/25/2019) 120 mL 0    LUPRON DEPOT 3.75 mg injection inject INTRA-MUSCULARLY once a month  0    pregabalin (LYRICA) 25 MG capsule Take 1 capsule (25 mg total) by mouth 2 (two) times daily. 60 capsule 6     No current facility-administered medications on file prior to visit.        Vitals:    11/25/19 1449   BP: 123/80   Pulse: 79       Physical Exam:    Physical Exam   Constitutional: She appears well-developed and well-nourished.   HENT:   Nose: No septal deviation.   Mouth/Throat: Mucous membranes are normal. No oral lesions.   Eyes:  Pupils are equal, round, and reactive to light. Right conjunctiva is not injected. Left conjunctiva is not injected.   Neck: No JVD present. No thyroid mass and no thyromegaly present.   Cardiovascular: Normal rate, regular rhythm and normal pulses.   No edema   Pulmonary/Chest: Effort normal and breath sounds normal.   Abdominal: Soft. Normal appearance. There is no hepatosplenomegaly.   Musculoskeletal:        Right shoulder: She exhibits normal range of motion, no tenderness and no swelling.        Left shoulder: She exhibits normal range of motion, no tenderness and no swelling.        Right elbow: She exhibits normal range of motion and no swelling. No tenderness found.        Left elbow: She exhibits normal range of motion and no swelling. No tenderness found.        Right wrist: She exhibits normal range of motion, no tenderness and no swelling.        Left wrist: She exhibits normal range of motion, no tenderness and no swelling.        Right hip: She exhibits normal range of motion, normal strength and no swelling.        Left hip: She exhibits normal range of motion, no tenderness and no swelling.        Right knee: She exhibits normal range of motion and no swelling. Tenderness found. Medial joint line tenderness noted.        Left knee: She exhibits decreased range of motion and swelling. Tenderness found. Medial joint line tenderness noted.        Right ankle: She exhibits normal range of motion and no swelling. No tenderness.        Left ankle: She exhibits normal range of motion and no swelling. No tenderness.        Right hand: She exhibits decreased range of motion and tenderness. She exhibits no swelling.        Left hand: She exhibits decreased range of motion and tenderness. She exhibits no swelling.        Right foot: There is tenderness and swelling.        Left foot: There is tenderness and swelling.   5/5 upper and lower extremity bilateral muscle strength   Lymphadenopathy:     She has no  cervical adenopathy.     She has no axillary adenopathy.   Neurological: She has normal strength and normal reflexes.   Skin: Skin is dry and intact.   Psychiatric: She has a normal mood and affect.             Assessment:       Encounter Diagnoses   Name Primary?    Other systemic lupus erythematosus with other organ involvement Yes    Pulmonary thromboembolism     Fibromyalgia     Fatigue, unspecified type     Lumbar radiculitis           Plan:        Other systemic lupus erythematosus with other organ involvement  -     Sedimentation rate; Future; Expected date: 11/25/2019  -     C-reactive protein; Future; Expected date: 11/25/2019  -     MADYSON Screen w/Reflex; Future; Expected date: 11/25/2019  -     Anti-Smith antibody; Future; Expected date: 11/25/2019  -     Anti Sm/RNP Antibody; Future; Expected date: 11/25/2019  -     Anti-DNA antibody, double-stranded; Future; Expected date: 11/25/2019    Pulmonary thromboembolism    Fibromyalgia    Fatigue, unspecified type    Lumbar radiculitis  -     X-Ray Lumbar Spine AP And Lateral; Future; Expected date: 11/25/2019  -     MRI Lumbar Spine Without Contrast; Future; Expected date: 11/25/2019      Patient with hx of UCTD/SLE, +rash and joint swelling. Stable on HCQ 400mg in AM no maculopathy. +livedo, hx of malar rash, no nephritis.    -continue HCQ   -Update serologies.    -UTD on eye exams.       WPI 9/SSS 10 with features of FMS   -failed elavil, pamelor, cymbalta, gabapentin and more recent Lyrica.    -holding off on Effexor    LBP :    Lumbar xray/sij imaging   MRI lumbar for radiculopathy   Consider EMG (burning and tingling)    Likely EDS type 1/benign joint hypermobility: [place] 8/9       Discussed the risks benefits and side effects of hydroxychloroquine including but not limited to retinal toxicity, rashes, nausea.  Patient is aware of the monitoring requirements of an annual eye exam will have this done following a trial to see if  tolerates the  medication.    Follow up in about 4 months (around 3/25/2020).      Thank you for allowing me to participate in the care of this very pleasant patient.    45min consultation with greater than 50% spent in counseling, chart review and coordination of care. All questions answered.

## 2019-12-03 LAB
HUMAN PAPILLOMAVIRUS (HPV): NORMAL
PAP RECOMMENDATION EXT: NORMAL
PAP SMEAR: NORMAL

## 2020-03-11 RX ORDER — HYDROXYCHLOROQUINE SULFATE 200 MG/1
TABLET ORAL
COMMUNITY
Start: 2020-01-20 | End: 2020-03-11 | Stop reason: SDUPTHER

## 2020-03-11 RX ORDER — HYDROXYCHLOROQUINE SULFATE 200 MG/1
200 TABLET ORAL 2 TIMES DAILY
Qty: 60 TABLET | Refills: 6 | Status: SHIPPED | OUTPATIENT
Start: 2020-03-11 | End: 2020-08-07

## 2020-08-14 ENCOUNTER — TELEPHONE (OUTPATIENT)
Dept: PAIN MEDICINE | Facility: CLINIC | Age: 30
End: 2020-08-14

## 2020-08-14 NOTE — TELEPHONE ENCOUNTER
Attempted to contact patient the following days:     8/10/20 LVM @1:50pm  8/12/20 LVM @1:57pm  8/14/20 LVM @ 10:34am         Patient is advise to contact office for an appointment

## 2020-10-19 ENCOUNTER — OFFICE VISIT (OUTPATIENT)
Dept: PAIN MEDICINE | Facility: CLINIC | Age: 30
End: 2020-10-19
Payer: COMMERCIAL

## 2020-10-19 VITALS
BODY MASS INDEX: 29.07 KG/M2 | DIASTOLIC BLOOD PRESSURE: 89 MMHG | SYSTOLIC BLOOD PRESSURE: 137 MMHG | HEART RATE: 87 BPM | WEIGHT: 154 LBS | HEIGHT: 61 IN

## 2020-10-19 DIAGNOSIS — G89.29 CHRONIC PAIN OF RIGHT KNEE: ICD-10-CM

## 2020-10-19 DIAGNOSIS — M25.561 CHRONIC PAIN OF RIGHT KNEE: ICD-10-CM

## 2020-10-19 DIAGNOSIS — Q79.60 EHLERS-DANLOS SYNDROME: Primary | ICD-10-CM

## 2020-10-19 DIAGNOSIS — M79.18 MYOFASCIAL PAIN SYNDROME, CERVICAL: ICD-10-CM

## 2020-10-19 PROCEDURE — 99204 PR OFFICE/OUTPT VISIT, NEW, LEVL IV, 45-59 MIN: ICD-10-PCS | Mod: S$GLB,,, | Performed by: ANESTHESIOLOGY

## 2020-10-19 PROCEDURE — 99999 PR PBB SHADOW E&M-EST. PATIENT-LVL III: ICD-10-PCS | Mod: PBBFAC,,, | Performed by: ANESTHESIOLOGY

## 2020-10-19 PROCEDURE — 99999 PR PBB SHADOW E&M-EST. PATIENT-LVL III: CPT | Mod: PBBFAC,,, | Performed by: ANESTHESIOLOGY

## 2020-10-19 PROCEDURE — 99204 OFFICE O/P NEW MOD 45 MIN: CPT | Mod: S$GLB,,, | Performed by: ANESTHESIOLOGY

## 2020-10-19 RX ORDER — CEPHALEXIN 500 MG/1
CAPSULE ORAL
COMMUNITY
Start: 2020-09-11 | End: 2021-03-23

## 2020-10-19 NOTE — PROGRESS NOTES
"  Referring Physician: Dinesh oJhnson.*    PCP: Dinesh Johnson DO    CC: neck, endometriosis pain, knee pain, hip pain    HPI:   Justine Akins is a 30 y.o. female with PMH significant for hx of PE, asthma, Ehler-Danlos syndrome, endometriosis s/p oopherectomy, hx of right knee surgeries (approximately 4 total), hx of right ankle reconstruction, lupus on plaquenil, and HTN presents as a referral for multiple pain complaints.     The patient reports that her neck pain > endometriosis pain > hip and knee pain    In regards to her neck pain, the patient reports that her pain began approximately a couple years ago after no inciting incident or trauma. The patient localizes her pain to center of her neck. The patient reports of radiation to the posterior aspect of her skull and into her eyes. She reports of pain at the level of her "atlas and axis." The patient reports of associated posterior headaches. The patient describes her pain as a constant sharp type of pain. The patient reports of intermittent numbness in her upper extremities into her index fingers. The patient reports that her pain is a 6/10. Patient denies of any urinary/fecal incontinence, saddle anesthesia, or weakness.     Aggravating factors: extension of the neck, lateral rotation    Mitigating factors: traction, ice pack, percocet/norco     In regards to her knee pain, the patient reports that her pain began approximately 2006 when she had a right meniscus tear s/p repair. She has also had subsequent ligamentous injuries in her right knee requiring surgical repair as well. The patient localizes her pain to the anteromedial aspect of her right knee pain. The patient reports of intermittent radiation to her right foot. The patient reports of intermittent subluxation of her right knee (reports that this occurs once per week). The patient describes her pain as a constant aching type of pain. The patient reports of numbness in her " right knee. The patient reports that her pain is a 4/10.     Aggravating factors: subluxation, walking    Mitigating factors: ice, rest, sitting in a wheel chair    In regards to her left hip pain, the patient reports that her pain began approximately in 2017 after no inciting incident or trauma. The patient localizes her pain to left hip. She believes that her hip pain may be related to her endometriosis. The patient reports of a longstanding history of gynecologic pain. The patient reports that she plans to establish care with a new Gyn MD as her previous Gyn doctor has moved. She reports that her previous Gynecologist discussed a possible hysterectomy in the future for treatment of her pain, but her and her  wish to have children one day which is why a hysterectomy has yet to be performed. The patient reports that her Gynecologist was providing her with opioids to help her manage her gynecologic pain.     Relevant Surgeries: yes; right knee and right ankle surgeries    Interventional Therapies: no    : Reviewed and consistent with medication use as prescribed.      Non-pharmacologic Treatment:     · Physical Therapy: yes; last done 4 years ago (shoulder and knee)  · Ice/Heat: yes  · TENS: yes; used in the past with benefit in regards to knee pain  · Massage: yes; done in the past with benefit   · Chiropractic care: yes; seen in the past and worsened her pain  · Acupuncture: no          Pain Medications:         · Currently taking: Vitamin D3, norco 5-325 mg (1/2 tablet) PO q day; OTC tylenol    · Has tried in the past:    · Opioids: yes  · NSAIDS: no; reports that she avoids NSAIDS secondary to her menstrual periods  · Tylenol: yes  · Muscle relaxants: yes; flexeril - significant drowsiness with flexeril  · TCAs: yes; elavil, pamelor - side effects (reports that she was too drowsy)  · SNRIs: yes; cymbalta - could not tolerate mood changes  · Anticonvulsants: yes, gabapentin, lyrica - side effects  (drowsy, fatigue)  · topical creams: yes; bengay; salon pas - some benefit     Anticoagulation: no; baby ASA    ROS:  Review of Systems   Constitutional: Negative for chills and fever.   HENT: Negative for sore throat.    Eyes: Negative for visual disturbance.   Respiratory: Negative for shortness of breath.    Cardiovascular: Negative for chest pain.   Gastrointestinal: Negative for nausea and vomiting.   Genitourinary: Negative for difficulty urinating.   Musculoskeletal: Positive for arthralgias, back pain, myalgias and neck pain.   Skin: Negative for rash.   Allergic/Immunologic: Negative for immunocompromised state.   Neurological: Positive for numbness and headaches. Negative for syncope.   Hematological: Does not bruise/bleed easily.   Psychiatric/Behavioral: Negative for suicidal ideas.        Past Medical History:   Diagnosis Date    Anesthesia complication     pt states she needs a pediatric ET tube used due to past airway complication (trauma from tube)    Arthritis     rhuematoid arthritis, osteoarthritis    Asthma     Chronic nausea     GERD (gastroesophageal reflux disease)     History of kidney stones     Hypertension     Lupus     Migraine headache     MTHFR mutation     Ovarian cyst     Panic attack as reaction to stress     Pulmonary embolism 2015    Respiratory distress     Thyroid disease     hypothyroid     Past Surgical History:   Procedure Laterality Date    ANKLE SURGERY Right 2011    reconstruction after avulsion and fracture    DIAGNOSTIC LAPAROSCOPY Right 7/31/2018    Procedure: LAPAROSCOPY, DIAGNOSTIC;  Surgeon: Vane Baez MD;  Location: [facility];  Service: OB/GYN;  Laterality: Right;    FRACTURE SURGERY      r ankle    KNEE ARTHROSCOPY Right 2006/2011/2013    KNEE ARTHROSCOPY W/ MENISCAL REPAIR Right     Replacement of meniscus and bone grafts    LAPAROSCOPIC SALPINGO-OOPHORECTOMY Right 7/31/2018    Procedure: SALPINGO-OOPHORECTOMY, LAPAROSCOPIC;  Surgeon: Vane  "GABRIELLE Baez MD;  Location: [facility];  Service: OB/GYN;  Laterality: Right;    TONSILLECTOMY  1994     Family History   Problem Relation Age of Onset    Diabetes Mother     Ovarian cancer Mother     Fibromyalgia Mother     Heart disease Father     Diabetes Father     Heart attack Father     Hyperlipidemia Father      Social History     Socioeconomic History    Marital status:      Spouse name: Not on file    Number of children: Not on file    Years of education: Not on file    Highest education level: Not on file   Occupational History    Not on file   Social Needs    Financial resource strain: Not on file    Food insecurity     Worry: Not on file     Inability: Not on file    Transportation needs     Medical: Not on file     Non-medical: Not on file   Tobacco Use    Smoking status: Never Smoker    Smokeless tobacco: Never Used   Substance and Sexual Activity    Alcohol use: No    Drug use: No    Sexual activity: Yes   Lifestyle    Physical activity     Days per week: Not on file     Minutes per session: Not on file    Stress: Not on file   Relationships    Social connections     Talks on phone: Not on file     Gets together: Not on file     Attends Nondenominational service: Not on file     Active member of club or organization: Not on file     Attends meetings of clubs or organizations: Not on file     Relationship status: Not on file   Other Topics Concern    Not on file   Social History Narrative    Not on file         Allergies: See med card    Vitals:    10/19/20 1008   BP: 137/89   Pulse: 87   Weight: 69.9 kg (154 lb)   Height: 5' 1" (1.549 m)   PainSc:   6   PainLoc: Back         Physical exam:  Physical Exam   Constitutional: She is oriented to person, place, and time and well-developed, well-nourished, and in no distress.   HENT:   Head: Normocephalic and atraumatic.   Eyes: Conjunctivae and EOM are normal. Right eye exhibits no discharge. Left eye exhibits no discharge. "   Cardiovascular: Normal rate.   Pulmonary/Chest: Effort normal and breath sounds normal. No respiratory distress.   Abdominal: Soft.   Neurological: She is alert and oriented to person, place, and time.   Skin: Skin is warm and dry. No rash noted. She is not diaphoretic.   Psychiatric: Mood, memory, affect and judgment normal.   Nursing note and vitals reviewed.       UPPER EXTREMITIES: Normal alignment, normal range of motion, no atrophy, no skin changes,  hair growth and nail growth normal and equal bilaterally. No swelling, no tenderness.    LOWER EXTREMITIES:  Normal alignment, normal range of motion, no atrophy, no skin changes,  hair growth and nail growth normal and equal bilaterally. No swelling, no tenderness.    Knee exam:    Extension/flexion equal bilaterally.   Positive crepitus with motion in the right knee.    Negative effusion both knees.    Positive joint line tenderness     CERVICAL SPINE:  Cervical spine: ROM is full in flexion, extension and lateral rotation with increased pain with passive ROM.  ((--)) Spurling's maneuver   Myofascial exam: Tenderness to palpation across cervical paraspinous region bilaterally.    LUMBAR SPINE  Lumbar spine: ROM is full with flexion extension and oblique extension with no increased pain.     ((--)) Supine straight leg raise    ((+)) Facet loading   Internal and external rotation of the hip causes no increased pain on either side.  Myofascial exam: Tenderness to palpation across lumbar paraspinous muscles.    ((+)) TTP at the SI joint  ((--)) MONTEZ's test  ((--)) One leg stand    ((+)) Distraction test    CRANIAL NERVES:  II:  PERRL bilaterally,   III,IV,VI: EOMI.    V:  Facial sensation equal bilaterally  VII:  Facial motor function normal.  VIII:  Hearing equal to finger rub bilaterally  IX/X: Gag normal, palate symmetric  XI:  Shoulder shrug equal, head turn equal  XII:  Tongue midline without fasciculations      MOTOR: Tone and bulk: normal bilateral upper  and lower Strength: normal   Delt Bi Tri WE WF     R 5 5 5 5 5 5   L 5 5 5 5 5 5     IP ADD ABD Quad TA Gas HAM  R 5 5 5 5 5 5 5  L 5 5 5 5 5 5 5    SENSATION: Light touch and pinprick intact bilaterally  REFLEXES: normal, symmetric, nonbrisk.  Toes down, no clonus. Negative suarez's sign bilaterally.  GAIT: normal rise, base, steps, and arm swing.      Imaging:  X-ray left hip (8/7/2020):  Minimal acetabular osteophytosis is noted.  Minimal subchondral sclerosis is seen in the left sacroiliac joint.  There is no acute displaced fracture or dislocation.  There is no acute soft tissue abnormality    X-ray right knee (8/31/2018):  There are no fractures seen.  There is no dislocation.  There are changes suspicious for an osteochondral defect in the medial femoral condyle.    X-ray lumbar spine (8/31/2018):  There are no fractures seen.  The alignment is within normal limits.  The intervertebral disc spaces are maintained.    X-ray thoracic spine (8/31/2018):  There are no fractures seen.  The alignment is within normal limits.  There is mild scoliosis present.    X-ray cervical spine (8/31/2018):  There are no fractures seen.  The alignment is within normal limits.  The intervertebral disc spaces are maintained.  The odontoid is intact.    X-ray Arthritis survey (11/6/2017):  Cervical spine:      The bones are osteopenic, possibly due to chronic steroid use.  There is reversal of the normal cervical lordosis which could relate to patient positioning.  No spondylolisthesis.  No widening of the anterior atlantodental interval.  No fracture or osseous destructive process.  No prevertebral soft tissue swelling.  The airway is patent.     Hands:     No evidence are osteopenic for age, possibly due to chronic steroid use.  No erosions.  No fracture or osseous destructive process.  No subluxation.  No soft tissue calcifications.     Knees:      The bones are osteopenic.  There is a subcortical insufficiency fracture  versus osteonecrosis versus a developing osteochondral lesion at the weight-bearing right medial femoral condyle.  The remaining visualized bony structures about both knees are unremarkable.  No chondrocalcinosis.     Feet:     There is bilateral right toe hallux valgus, bunion formation at the great toe metatarsal head, and degenerative change at both great toe MTP joints.  There is metatarsus adductus noted bilaterally.  No fracture or osseous destructive process.  No erosions.  No calcified tophi.    Assessment: Justine Akins is a 30 y.o. female with PMH significant for hx of PE, asthma, Ehler-Danlos syndrome, endometriosis s/p oopherectomy, hx of right knee surgeries (approximately 4 total), hx of right ankle reconstruction, lupus on plaquenil, and HTN presents as a referral for multiple pain complaints. Plain films of the right knee, left hip, and entire spine reviewed without any severe pathology. 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 Patient does not want treatment options involving steroids secondary to a prior history of chronic steroid use for the management of lupus (patient no longer on chronic steroids). Treatment plan outlined below.     Plan:  - Provided pamphlet regarding cervical medial branch blocks as a potential treatment modality for the patient's chronic cervicalgia with associated headaches; patient to consider  - Discussed viscosupplementation as a potential treatment modality of the patient's chronic right knee pain; patient to consider   - PT referral provided for cervical traction  - Muscle Stimulator/TENS unit was ordered to prevent further disuse atrophy and help with pain. The patient would likely benefit from this. It is to be use it at least twice a day for a minimum of 60 minutes each time.  - We had an extensive conversation about chronic opioid use for pain management. I  explained to the patient that I do not recommend long term opioid therapy. Patient counseled about the side effects of long term use of opioids including but not limited to: dependence, tolerance, addiction, respiratory depression, somnolence, and immune/endocrine dysfunction. Specifically for this patient, I do not recommend chronic opioids for the management of EDS and lupus related pain. I also explained to the patient that chronic opioid use is not ideal given that the patient and her  reports that they intend to have children in the future  - I emphasized the importance of re-establishing care with a Gynecologist as the patient reports of a significant history of endometriosis related pain  - RTC PRN to discuss interventions if the patient and her  are interested in the treatment modalities as outlined above.     Thank you for referring this interesting patient, and I look forward to continuing to collaborate in her care.    Zoltan Madden MD  Pain Management

## 2020-10-19 NOTE — LETTER
October 19, 2020      Dinesh Johnson, DO  [address] [facility] [id number]           [place] [facility]  [address] [place] [address] [address]  [facility] [address]  Phone: [phone]  Fax: [phone]          Patient: Justine Akins   MR Number: [id number]   YOB: 1990   Date of Visit: 10/19/2020       Dear Dr. Dinesh Johnson:    Thank you for referring Justine Akins to me for evaluation. Attached you will find relevant portions of my assessment and plan of care.    If you have questions, please do not hesitate to call me. I look forward to following Justine Akins along with you.    Sincerely,    Zoltan Madden MD    Enclosure  CC:  No Recipients    If you would like to receive this communication electronically, please contact [email or web address] or [phone] to request more information on [employer] Link access.    For providers and/or their staff who would like to refer a patient to [facility], please contact us through our one-stop-shop provider referral line, [facility], at [phone].    If you feel you have received this communication in error or would no longer like to receive these types of communications, please e-mail [email or web address]

## 2021-04-02 PROBLEM — Q79.60 EHLERS-DANLOS SYNDROME: Status: ACTIVE | Noted: 2021-04-02

## 2021-04-02 PROBLEM — G89.4 CHRONIC PAIN SYNDROME: Status: ACTIVE | Noted: 2021-04-02

## 2022-11-04 ENCOUNTER — HOSPITAL ENCOUNTER (OUTPATIENT)
Dept: RADIOLOGY | Facility: HOSPITAL | Age: 32
Discharge: HOME | End: 2022-11-04
Attending: FAMILY MEDICINE
Payer: COMMERCIAL

## 2022-11-04 ENCOUNTER — OFFICE VISIT (OUTPATIENT)
Dept: FAMILY MEDICINE | Facility: CLINIC | Age: 32
End: 2022-11-04
Payer: COMMERCIAL

## 2022-11-04 VITALS
OXYGEN SATURATION: 99 % | RESPIRATION RATE: 18 BRPM | BODY MASS INDEX: 30.71 KG/M2 | HEIGHT: 61 IN | SYSTOLIC BLOOD PRESSURE: 128 MMHG | DIASTOLIC BLOOD PRESSURE: 82 MMHG | WEIGHT: 162.69 LBS | HEART RATE: 99 BPM

## 2022-11-04 DIAGNOSIS — Z79.899 IMMUNOCOMPROMISED STATE DUE TO DRUG THERAPY: ICD-10-CM

## 2022-11-04 DIAGNOSIS — Z11.59 NEED FOR HEPATITIS C SCREENING TEST: ICD-10-CM

## 2022-11-04 DIAGNOSIS — E03.9 HYPOTHYROIDISM, UNSPECIFIED TYPE: ICD-10-CM

## 2022-11-04 DIAGNOSIS — R05.9 COUGH, UNSPECIFIED TYPE: ICD-10-CM

## 2022-11-04 DIAGNOSIS — N80.9 ENDOMETRIOSIS: ICD-10-CM

## 2022-11-04 DIAGNOSIS — D84.821 IMMUNOCOMPROMISED STATE DUE TO DRUG THERAPY: ICD-10-CM

## 2022-11-04 DIAGNOSIS — M32.9 SLE (SYSTEMIC LUPUS ERYTHEMATOSUS RELATED SYNDROME): ICD-10-CM

## 2022-11-04 DIAGNOSIS — I10 HYPERTENSION, UNSPECIFIED TYPE: Primary | ICD-10-CM

## 2022-11-04 DIAGNOSIS — Q79.60 EDS (EHLERS-DANLOS SYNDROME): ICD-10-CM

## 2022-11-04 PROCEDURE — 3008F PR BODY MASS INDEX (BMI) DOCUMENTED: ICD-10-PCS | Mod: CPTII,S$GLB,, | Performed by: FAMILY MEDICINE

## 2022-11-04 PROCEDURE — 71046 X-RAY EXAM CHEST 2 VIEWS: CPT | Mod: 26,,, | Performed by: RADIOLOGY

## 2022-11-04 PROCEDURE — 4010F ACE/ARB THERAPY RXD/TAKEN: CPT | Mod: CPTII,S$GLB,, | Performed by: FAMILY MEDICINE

## 2022-11-04 PROCEDURE — 3008F BODY MASS INDEX DOCD: CPT | Mod: CPTII,S$GLB,, | Performed by: FAMILY MEDICINE

## 2022-11-04 PROCEDURE — 3074F SYST BP LT 130 MM HG: CPT | Mod: CPTII,S$GLB,, | Performed by: FAMILY MEDICINE

## 2022-11-04 PROCEDURE — 1159F MED LIST DOCD IN RCRD: CPT | Mod: CPTII,S$GLB,, | Performed by: FAMILY MEDICINE

## 2022-11-04 PROCEDURE — 99214 OFFICE O/P EST MOD 30 MIN: CPT | Mod: S$GLB,,, | Performed by: FAMILY MEDICINE

## 2022-11-04 PROCEDURE — 3079F PR MOST RECENT DIASTOLIC BLOOD PRESSURE 80-89 MM HG: ICD-10-PCS | Mod: CPTII,S$GLB,, | Performed by: FAMILY MEDICINE

## 2022-11-04 PROCEDURE — 99999 PR PBB SHADOW E&M-EST. PATIENT-LVL V: ICD-10-PCS | Mod: PBBFAC,,, | Performed by: FAMILY MEDICINE

## 2022-11-04 PROCEDURE — 1159F PR MEDICATION LIST DOCUMENTED IN MEDICAL RECORD: ICD-10-PCS | Mod: CPTII,S$GLB,, | Performed by: FAMILY MEDICINE

## 2022-11-04 PROCEDURE — 4010F PR ACE/ARB THEARPY RXD/TAKEN: ICD-10-PCS | Mod: CPTII,S$GLB,, | Performed by: FAMILY MEDICINE

## 2022-11-04 PROCEDURE — 99214 PR OFFICE/OUTPT VISIT, EST, LEVL IV, 30-39 MIN: ICD-10-PCS | Mod: S$GLB,,, | Performed by: FAMILY MEDICINE

## 2022-11-04 PROCEDURE — 3074F PR MOST RECENT SYSTOLIC BLOOD PRESSURE < 130 MM HG: ICD-10-PCS | Mod: CPTII,S$GLB,, | Performed by: FAMILY MEDICINE

## 2022-11-04 PROCEDURE — 3079F DIAST BP 80-89 MM HG: CPT | Mod: CPTII,S$GLB,, | Performed by: FAMILY MEDICINE

## 2022-11-04 PROCEDURE — 71046 X-RAY EXAM CHEST 2 VIEWS: CPT | Mod: TC,FY,PO

## 2022-11-04 PROCEDURE — 99999 PR PBB SHADOW E&M-EST. PATIENT-LVL V: CPT | Mod: PBBFAC,,, | Performed by: FAMILY MEDICINE

## 2022-11-04 PROCEDURE — 1160F PR REVIEW ALL MEDS BY PRESCRIBER/CLIN PHARMACIST DOCUMENTED: ICD-10-PCS | Mod: CPTII,S$GLB,, | Performed by: FAMILY MEDICINE

## 2022-11-04 PROCEDURE — 1160F RVW MEDS BY RX/DR IN RCRD: CPT | Mod: CPTII,S$GLB,, | Performed by: FAMILY MEDICINE

## 2022-11-04 PROCEDURE — 71046 XR CHEST PA AND LATERAL: ICD-10-PCS | Mod: 26,,, | Performed by: RADIOLOGY

## 2022-11-04 RX ORDER — LOSARTAN POTASSIUM 25 MG/1
25 TABLET ORAL DAILY
Qty: 90 TABLET | Refills: 3 | Status: SHIPPED | OUTPATIENT
Start: 2022-11-04 | End: 2023-07-14

## 2022-11-04 NOTE — PROGRESS NOTES
"Subjective:       Patient ID: Justine Akins is a 32 y.o. female.    Chief Complaint: Establish Care    Here today to establish care with a new PCP.   She was seeing Kiana Leon NP who has left.      Hypothyroid: Dx about 5 years ago.  She was on Synthroid 50 mcg.  She has been off her medication for the last few months as she ran out.  Overall, she does not feel a brig difference off the medication.  Last TSH was in 2021    Endometriosis:  Has had surgery in the past.  She has seen 3 different GYNs who have deferred future surgery.  She has multiple adhesions.  She has ovarian cyst.    Fibromyalgia/Conective Tissues Disease/SLE: Followed by Rheum, but last seen in Nov 2019 due to COVID.  She is on Plaquenil.  She is on Tramadol per pain mgt.    HTN:  She is on Losartan.  History of GRUBER.    Review of Systems   Constitutional:  Negative for activity change, appetite change, fatigue and fever.   Respiratory:  Positive for cough (some left sided back pain, occasionally pink tinged). Negative for shortness of breath and wheezing.    Cardiovascular:  Negative for chest pain and palpitations.   Gastrointestinal:  Negative for abdominal pain, constipation, diarrhea and nausea.   Skin:  Negative for pallor and rash.   Neurological:  Negative for dizziness, syncope, light-headedness and headaches.     Objective:      Vitals:    11/04/22 1418   BP: 128/82   BP Location: Left arm   Patient Position: Sitting   BP Method: Medium (Manual)   Pulse: 99   Resp: 18   SpO2: 99%   Weight: 73.8 kg (162 lb 11.2 oz)   Height: 5' 1" (1.549 m)      Physical Exam  Constitutional:       General: She is not in acute distress.  Cardiovascular:      Rate and Rhythm: Normal rate and regular rhythm.      Heart sounds: Normal heart sounds. No murmur heard.  Pulmonary:      Effort: Pulmonary effort is normal. No respiratory distress.      Breath sounds: Normal breath sounds. No wheezing, rhonchi or rales.   Skin:     General: Skin is " warm and dry.   Neurological:      General: No focal deficit present.      Mental Status: She is alert.   Psychiatric:         Mood and Affect: Mood normal.         Behavior: Behavior normal.         Thought Content: Thought content normal.          Assessment:       1. Hypertension, unspecified type    2. SLE (systemic lupus erythematosus related syndrome)    3. Endometriosis    4. Hypothyroidism, unspecified type    5. EDS (Jesse-Danlos syndrome)    6. Need for hepatitis C screening test    7. Cough, unspecified type    8. Immunocompromised state due to drug therapy        Plan:       Hypertension, unspecified type  -     CBC Auto Differential; Future; Expected date: 11/04/2022  -     Comprehensive Metabolic Panel; Future; Expected date: 11/04/2022  -     Lipid Panel; Future; Expected date: 11/04/2022  -     Urinalysis, Reflex to Urine Culture Urine, Clean Catch; Future; Expected date: 11/04/2022  -     losartan (COZAAR) 25 MG tablet; Take 1 tablet (25 mg total) by mouth once daily.  Dispense: 90 tablet; Refill: 3    SLE (systemic lupus erythematosus related syndrome)    Endometriosis  -     Ambulatory referral/consult to Oncology; Future; Expected date: 11/11/2022  Will try to get her in with GYN Onc as they may be the only one who can do the surgery she needs    Hypothyroidism, unspecified type  -     TSH; Future; Expected date: 11/04/2022  Check TSH.  If normal, okay off medication.  EDS (Jesse-Danlos syndrome)    Need for hepatitis C screening test  Check today  Cough, unspecified type  -     X-Ray Chest PA And Lateral; Future; Expected date: 11/04/2022        Medication List with Changes/Refills   Current Medications    ALBUTEROL-IPRATROPIUM (DUO-NEB) 2.5 MG-0.5 MG/3 ML NEBULIZER SOLUTION    Take 3 mLs by nebulization every 6 (six) hours as needed for Wheezing. Rescue    ASPIRIN (ECOTRIN) 81 MG EC TABLET    Take 81 mg by mouth once daily.    BACLOFEN (LIORESAL) 10 MG TABLET    TAKE 1-2 TABLETS (10-20 MG  TOTAL) BY MOUTH 3 (THREE) TIMES DAILY AS NEEDED (EDS, MUSCLE SPASM).    BUDESONIDE (PULMICORT) 0.5 MG/2 ML NEBULIZER SOLUTION    Take 2 mLs (0.5 mg total) by nebulization once daily. Controller    CHOLECALCIFEROL, VITAMIN D3, 5,000 UNIT TAB    Take 5,000 Units by mouth once daily.     FISH OIL-OMEGA-3 FATTY ACIDS 300-1,000 MG CAPSULE    Take by mouth once daily.    HYDROXYCHLOROQUINE (PLAQUENIL) 200 MG TABLET    Take 200 mg by mouth once daily.    MAGNESIUM OXIDE (MAG-OX) 400 MG (241.3 MG MAGNESIUM) TABLET    Take 400 mg by mouth once daily.    ONDANSETRON (ZOFRAN-ODT) 4 MG TBDL    DISSOLVE 1 TABLET (4 MG TOTAL) UNDER THE TONGUE EVERY 8 (EIGHT) HOURS AS NEEDED.    SYNTHROID 50 MCG TABLET    TAKE 1 TABLET (50 MCG TOTAL) BY MOUTH EVERY MORNING. NEEDS THYROID LABS CHECKED    TRAMADOL (ULTRAM) 50 MG TABLET    TAKE 1 TABLET BY MOUTH EVERY 6 HOURS AS NEEDED FOR PAIN   Changed and/or Refilled Medications    Modified Medication Previous Medication    LOSARTAN (COZAAR) 25 MG TABLET losartan (COZAAR) 25 MG tablet       Take 1 tablet (25 mg total) by mouth once daily.    Take 1 tablet (25 mg total) by mouth once daily.   Discontinued Medications    DILTIAZEM (CARDIZEM) 30 MG TABLET    Take 30 mg by mouth 3 (three) times daily.

## 2022-11-07 ENCOUNTER — TELEPHONE (OUTPATIENT)
Dept: HEMATOLOGY/ONCOLOGY | Facility: CLINIC | Age: 32
End: 2022-11-07
Payer: COMMERCIAL

## 2022-11-08 ENCOUNTER — TELEPHONE (OUTPATIENT)
Dept: HEMATOLOGY/ONCOLOGY | Facility: CLINIC | Age: 32
End: 2022-11-08
Payer: COMMERCIAL

## 2022-11-08 NOTE — TELEPHONE ENCOUNTER
Discuss oncology referral with GYN navigator.  She will take over the referral; dx of endometriosis, and has hx of SLE (Lupus).

## 2022-11-11 ENCOUNTER — TELEPHONE (OUTPATIENT)
Dept: HEMATOLOGY/ONCOLOGY | Facility: CLINIC | Age: 32
End: 2022-11-11

## 2022-11-11 NOTE — NURSING
Patient unavailable.  Spoke to her . Left contact information.  He said he would ask her to call me back.

## 2022-12-01 ENCOUNTER — OFFICE VISIT (OUTPATIENT)
Dept: FAMILY MEDICINE | Facility: CLINIC | Age: 32
End: 2022-12-01
Payer: COMMERCIAL

## 2022-12-01 ENCOUNTER — PATIENT OUTREACH (OUTPATIENT)
Dept: ADMINISTRATIVE | Facility: HOSPITAL | Age: 32
End: 2022-12-01
Payer: COMMERCIAL

## 2022-12-01 VITALS
SYSTOLIC BLOOD PRESSURE: 120 MMHG | WEIGHT: 162.94 LBS | HEART RATE: 98 BPM | BODY MASS INDEX: 30.76 KG/M2 | OXYGEN SATURATION: 99 % | HEIGHT: 61 IN | DIASTOLIC BLOOD PRESSURE: 82 MMHG

## 2022-12-01 DIAGNOSIS — Z79.899 IMMUNOCOMPROMISED STATE DUE TO DRUG THERAPY: ICD-10-CM

## 2022-12-01 DIAGNOSIS — E78.2 MIXED HYPERLIPIDEMIA: ICD-10-CM

## 2022-12-01 DIAGNOSIS — E03.9 HYPOTHYROIDISM, UNSPECIFIED TYPE: Primary | ICD-10-CM

## 2022-12-01 DIAGNOSIS — D84.821 IMMUNOCOMPROMISED STATE DUE TO DRUG THERAPY: ICD-10-CM

## 2022-12-01 DIAGNOSIS — M32.9 SLE (SYSTEMIC LUPUS ERYTHEMATOSUS RELATED SYNDROME): ICD-10-CM

## 2022-12-01 DIAGNOSIS — N80.9 ENDOMETRIOSIS: ICD-10-CM

## 2022-12-01 DIAGNOSIS — I10 HYPERTENSION, UNSPECIFIED TYPE: ICD-10-CM

## 2022-12-01 PROCEDURE — 1160F RVW MEDS BY RX/DR IN RCRD: CPT | Mod: CPTII,S$GLB,, | Performed by: FAMILY MEDICINE

## 2022-12-01 PROCEDURE — 99999 PR PBB SHADOW E&M-EST. PATIENT-LVL III: ICD-10-PCS | Mod: PBBFAC,,, | Performed by: FAMILY MEDICINE

## 2022-12-01 PROCEDURE — 99999 PR PBB SHADOW E&M-EST. PATIENT-LVL III: CPT | Mod: PBBFAC,,, | Performed by: FAMILY MEDICINE

## 2022-12-01 PROCEDURE — 1160F PR REVIEW ALL MEDS BY PRESCRIBER/CLIN PHARMACIST DOCUMENTED: ICD-10-PCS | Mod: CPTII,S$GLB,, | Performed by: FAMILY MEDICINE

## 2022-12-01 PROCEDURE — 4010F ACE/ARB THERAPY RXD/TAKEN: CPT | Mod: CPTII,S$GLB,, | Performed by: FAMILY MEDICINE

## 2022-12-01 PROCEDURE — 3074F SYST BP LT 130 MM HG: CPT | Mod: CPTII,S$GLB,, | Performed by: FAMILY MEDICINE

## 2022-12-01 PROCEDURE — 3008F BODY MASS INDEX DOCD: CPT | Mod: CPTII,S$GLB,, | Performed by: FAMILY MEDICINE

## 2022-12-01 PROCEDURE — 3074F PR MOST RECENT SYSTOLIC BLOOD PRESSURE < 130 MM HG: ICD-10-PCS | Mod: CPTII,S$GLB,, | Performed by: FAMILY MEDICINE

## 2022-12-01 PROCEDURE — 99213 PR OFFICE/OUTPT VISIT, EST, LEVL III, 20-29 MIN: ICD-10-PCS | Mod: S$GLB,,, | Performed by: FAMILY MEDICINE

## 2022-12-01 PROCEDURE — 3008F PR BODY MASS INDEX (BMI) DOCUMENTED: ICD-10-PCS | Mod: CPTII,S$GLB,, | Performed by: FAMILY MEDICINE

## 2022-12-01 PROCEDURE — 3079F DIAST BP 80-89 MM HG: CPT | Mod: CPTII,S$GLB,, | Performed by: FAMILY MEDICINE

## 2022-12-01 PROCEDURE — 1159F PR MEDICATION LIST DOCUMENTED IN MEDICAL RECORD: ICD-10-PCS | Mod: CPTII,S$GLB,, | Performed by: FAMILY MEDICINE

## 2022-12-01 PROCEDURE — 99213 OFFICE O/P EST LOW 20 MIN: CPT | Mod: S$GLB,,, | Performed by: FAMILY MEDICINE

## 2022-12-01 PROCEDURE — 1159F MED LIST DOCD IN RCRD: CPT | Mod: CPTII,S$GLB,, | Performed by: FAMILY MEDICINE

## 2022-12-01 PROCEDURE — 3079F PR MOST RECENT DIASTOLIC BLOOD PRESSURE 80-89 MM HG: ICD-10-PCS | Mod: CPTII,S$GLB,, | Performed by: FAMILY MEDICINE

## 2022-12-01 PROCEDURE — 4010F PR ACE/ARB THEARPY RXD/TAKEN: ICD-10-PCS | Mod: CPTII,S$GLB,, | Performed by: FAMILY MEDICINE

## 2022-12-01 NOTE — PROGRESS NOTES
"Subjective:       Patient ID: Justine Akins is a 32 y.o. female.    Chief Complaint: Follow-up    Here today to follow up on chronic medical conditions.     Hypothyroid: Dx about 5 years ago.  She had a recent TSH which was in normal range off medication.     Endometriosis:  Has had surgery in the past.  She has seen 3 different GYNs who have deferred future surgery.  She has multiple adhesions.  She has ovarian cyst.  She has been in contact with the nurse navigator to get her scheduled.     Fibromyalgia/Conective Tissues Disease/SLE: Followed by Rheum, but last seen in Nov 2019 due to COVID.  She is on Plaquenil.  She is on Tramadol per pain mgt.  She has an appt with Rheum in about 4 months    Review of Systems   Constitutional:  Negative for activity change, appetite change, fatigue and fever.   Respiratory:  Negative for cough, shortness of breath and wheezing.    Cardiovascular:  Negative for chest pain and palpitations.   Gastrointestinal:  Negative for abdominal pain, constipation, diarrhea and nausea.   Skin:  Negative for pallor and rash.   Neurological:  Negative for dizziness, syncope, light-headedness and headaches.     Objective:      Vitals:    12/01/22 1326   BP: 120/82   Pulse: 98   SpO2: 99%   Weight: 73.9 kg (162 lb 14.7 oz)   Height: 5' 1" (1.549 m)      Physical Exam  Constitutional:       General: She is not in acute distress.  Cardiovascular:      Rate and Rhythm: Normal rate and regular rhythm.      Heart sounds: Normal heart sounds. No murmur heard.  Pulmonary:      Effort: Pulmonary effort is normal. No respiratory distress.      Breath sounds: Normal breath sounds. No wheezing, rhonchi or rales.   Musculoskeletal:         General: No swelling.   Skin:     General: Skin is warm and dry.   Neurological:      General: No focal deficit present.      Mental Status: She is alert.   Psychiatric:         Mood and Affect: Mood normal.         Behavior: Behavior normal.         Thought " Content: Thought content normal.       Results for orders placed or performed in visit on 11/04/22   CBC Auto Differential   Result Value Ref Range    WBC 7.80 3.90 - 12.70 K/uL    RBC 4.87 4.00 - 5.40 M/uL    Hemoglobin 14.3 12.0 - 16.0 g/dL    Hematocrit 43.9 37.0 - 48.5 %    MCV 90 82 - 98 fL    MCH 29.4 27.0 - 31.0 pg    MCHC 32.6 32.0 - 36.0 g/dL    RDW 11.9 11.5 - 14.5 %    Platelets 394 150 - 450 K/uL    MPV 11.6 9.2 - 12.9 fL    Immature Granulocytes 0.4 0.0 - 0.5 %    Gran # (ANC) 4.5 1.8 - 7.7 K/uL    Immature Grans (Abs) 0.03 0.00 - 0.04 K/uL    Lymph # 2.3 1.0 - 4.8 K/uL    Mono # 0.7 0.3 - 1.0 K/uL    Eos # 0.2 0.0 - 0.5 K/uL    Baso # 0.06 0.00 - 0.20 K/uL    nRBC 0 0 /100 WBC    Gran % 58.1 38.0 - 73.0 %    Lymph % 30.0 18.0 - 48.0 %    Mono % 8.6 4.0 - 15.0 %    Eosinophil % 2.1 0.0 - 8.0 %    Basophil % 0.8 0.0 - 1.9 %    Differential Method Automated    Comprehensive Metabolic Panel   Result Value Ref Range    Sodium 139 136 - 145 mmol/L    Potassium 4.0 3.5 - 5.1 mmol/L    Chloride 104 95 - 110 mmol/L    CO2 24 23 - 29 mmol/L    Glucose 98 70 - 110 mg/dL    BUN 10 6 - 20 mg/dL    Creatinine 0.8 0.5 - 1.4 mg/dL    Calcium 10.2 8.7 - 10.5 mg/dL    Total Protein 7.6 6.0 - 8.4 g/dL    Albumin 4.3 3.5 - 5.2 g/dL    Total Bilirubin 0.3 0.1 - 1.0 mg/dL    Alkaline Phosphatase 87 55 - 135 U/L    AST 15 10 - 40 U/L    ALT 28 10 - 44 U/L    Anion Gap 11 8 - 16 mmol/L    eGFR >60.0 >60 mL/min/1.73 m^2   Lipid Panel   Result Value Ref Range    Cholesterol 227 (H) 120 - 199 mg/dL    Triglycerides 280 (H) 30 - 150 mg/dL    HDL 39 (L) 40 - 75 mg/dL    LDL Cholesterol 132.0 63.0 - 159.0 mg/dL    HDL/Cholesterol Ratio 17.2 (L) 20.0 - 50.0 %    Total Cholesterol/HDL Ratio 5.8 (H) 2.0 - 5.0    Non-HDL Cholesterol 188 mg/dL   TSH   Result Value Ref Range    TSH 1.528 0.400 - 4.000 uIU/mL      Assessment:       1. Hypothyroidism, unspecified type    2. Hypertension, unspecified type    3. Mixed hyperlipidemia    4.  SLE (systemic lupus erythematosus related syndrome)    5. Endometriosis    6. Immunocompromised state due to drug therapy          Plan:       Hypothyroidism, unspecified type    Hypertension, unspecified type    Mixed hyperlipidemia    SLE (systemic lupus erythematosus related syndrome)    Endometriosis    Immunocompromised state due to drug therapy      Discussed flu vaccine (deferred) and Prevnar 20 (which she will look into).  TSH normal off medication so will keep off Synthroid for now.   Medication List with Changes/Refills   Current Medications    ALBUTEROL-IPRATROPIUM (DUO-NEB) 2.5 MG-0.5 MG/3 ML NEBULIZER SOLUTION    Take 3 mLs by nebulization every 6 (six) hours as needed for Wheezing. Rescue    ASPIRIN (ECOTRIN) 81 MG EC TABLET    Take 81 mg by mouth once daily.    BUDESONIDE (PULMICORT) 0.5 MG/2 ML NEBULIZER SOLUTION    Take 2 mLs (0.5 mg total) by nebulization once daily. Controller    CHOLECALCIFEROL, VITAMIN D3, 5,000 UNIT TAB    Take 5,000 Units by mouth once daily.     FISH OIL-OMEGA-3 FATTY ACIDS 300-1,000 MG CAPSULE    Take by mouth once daily.    HYDROXYCHLOROQUINE (PLAQUENIL) 200 MG TABLET    Take 200 mg by mouth once daily.    LOSARTAN (COZAAR) 25 MG TABLET    Take 1 tablet (25 mg total) by mouth once daily.    MAGNESIUM OXIDE (MAG-OX) 400 MG (241.3 MG MAGNESIUM) TABLET    Take 400 mg by mouth once daily.    ONDANSETRON (ZOFRAN-ODT) 4 MG TBDL    DISSOLVE 1 TABLET (4 MG TOTAL) UNDER THE TONGUE EVERY 8 (EIGHT) HOURS AS NEEDED.    TRAMADOL (ULTRAM) 50 MG TABLET    TAKE 1 TABLET BY MOUTH EVERY 6 HOURS AS NEEDED FOR PAIN   Discontinued Medications    BACLOFEN (LIORESAL) 10 MG TABLET    TAKE 1-2 TABLETS (10-20 MG TOTAL) BY MOUTH 3 (THREE) TIMES DAILY AS NEEDED (EDS, MUSCLE SPASM).    SYNTHROID 50 MCG TABLET    TAKE 1 TABLET (50 MCG TOTAL) BY MOUTH EVERY MORNING. NEEDS THYROID LABS CHECKED

## 2022-12-15 ENCOUNTER — PATIENT OUTREACH (OUTPATIENT)
Dept: ADMINISTRATIVE | Facility: HOSPITAL | Age: 32
End: 2022-12-15
Payer: COMMERCIAL

## 2023-02-20 ENCOUNTER — TELEPHONE (OUTPATIENT)
Dept: HEMATOLOGY/ONCOLOGY | Facility: CLINIC | Age: 33
End: 2023-02-20
Payer: COMMERCIAL

## 2023-02-20 NOTE — TELEPHONE ENCOUNTER
Pt called to schedule an appt.  Verified with pt and she said she was calling for GYN oncology.    With holiday this week, informed her the Gyn navigator will be back on Wed.  The pt said that would be great, but the pt then request a call back on Thursday Feb 23 from Noel Alvarenga.    Msg sent.

## 2023-02-22 ENCOUNTER — PATIENT MESSAGE (OUTPATIENT)
Dept: HEMATOLOGY/ONCOLOGY | Facility: CLINIC | Age: 33
End: 2023-02-22
Payer: COMMERCIAL

## 2023-02-23 ENCOUNTER — TELEPHONE (OUTPATIENT)
Dept: HEMATOLOGY/ONCOLOGY | Facility: CLINIC | Age: 33
End: 2023-02-23
Payer: COMMERCIAL

## 2023-02-23 DIAGNOSIS — N80.9 ENDOMETRIOSIS: Primary | ICD-10-CM

## 2023-02-23 DIAGNOSIS — N80.129 ENDOMETRIOMA OF OVARY: ICD-10-CM

## 2023-02-23 NOTE — NURSING
Spoke to pt.  She is requesting to see Dr. Brown.  I told her the navigator for the [facility] would reach out to her to schedule the appt.  Message sent to [place].

## 2023-02-23 NOTE — TELEPHONE ENCOUNTER
"Pt contacted to discuss her desire to proceed with a surgical consult to address her abdominal adhesions and endometriomas (8cm and 10cm). Pt reports having consults with 5 different GYNs in the past but due to her "blood clot disorder" and Lupus they have not felt comfortable proceeding with surgery. The last surgery she had a left oophorectomy with Dr Vane Baez at [facility]. Pt was updated that her bowel, bladder and uterus are adhered or "glued together" and that most of her adhesions are in the pouch of juan. Dr Baez indicated to her that proceeding any further in her surgery could potentially result in a colostomy the procedure was stopped.     Pt current main concern is taking care of the adhesions and endometriomas. She would love a pregnancy but she said she can even being to think about growing a baby with the current abdominal adhesions and discomfort. PT reports being to the ER x2 for "ripping" of the adhesions and is constantly uncomfortable. She has bladder pains and feels a pulling or can't urinate sometimes. She reports it as muscular vs "UTI" type pain.     Navigator discussed her care with Dr Doe Mcdonald who suggested that Dr Bernstein or Dr Cho would be the best fit to care for her complicated case. Pt updated that referral would be entered for her to see Dr Bernstein and she would be contacted for scheduling. Pt hopeful that she can see a Provider who is willing and able to do her surgery.   "

## 2023-02-24 ENCOUNTER — TELEPHONE (OUTPATIENT)
Dept: OBSTETRICS AND GYNECOLOGY | Facility: CLINIC | Age: 33
End: 2023-02-24
Payer: COMMERCIAL

## 2023-02-24 NOTE — TELEPHONE ENCOUNTER
Tried to reach patient, appt scheduled with  for problem/concern. Patient does not have voicemail set up.

## 2023-03-09 ENCOUNTER — OFFICE VISIT (OUTPATIENT)
Dept: OBSTETRICS AND GYNECOLOGY | Facility: CLINIC | Age: 33
End: 2023-03-09
Payer: COMMERCIAL

## 2023-03-09 VITALS
BODY MASS INDEX: 29.97 KG/M2 | HEIGHT: 61 IN | WEIGHT: 158.75 LBS | DIASTOLIC BLOOD PRESSURE: 115 MMHG | SYSTOLIC BLOOD PRESSURE: 159 MMHG

## 2023-03-09 DIAGNOSIS — N80.9 ENDOMETRIOSIS: ICD-10-CM

## 2023-03-09 DIAGNOSIS — M79.7 FIBROMYALGIA: ICD-10-CM

## 2023-03-09 DIAGNOSIS — N80.129 ENDOMETRIOMA OF OVARY: Primary | ICD-10-CM

## 2023-03-09 DIAGNOSIS — M32.9 SLE (SYSTEMIC LUPUS ERYTHEMATOSUS RELATED SYNDROME): ICD-10-CM

## 2023-03-09 DIAGNOSIS — Q79.60 EHLERS-DANLOS SYNDROME: ICD-10-CM

## 2023-03-09 PROCEDURE — 1160F RVW MEDS BY RX/DR IN RCRD: CPT | Mod: CPTII,S$GLB,, | Performed by: OBSTETRICS & GYNECOLOGY

## 2023-03-09 PROCEDURE — 1159F MED LIST DOCD IN RCRD: CPT | Mod: CPTII,S$GLB,, | Performed by: OBSTETRICS & GYNECOLOGY

## 2023-03-09 PROCEDURE — 99999 PR PBB SHADOW E&M-EST. PATIENT-LVL III: CPT | Mod: PBBFAC,,, | Performed by: OBSTETRICS & GYNECOLOGY

## 2023-03-09 PROCEDURE — 3077F SYST BP >= 140 MM HG: CPT | Mod: CPTII,S$GLB,, | Performed by: OBSTETRICS & GYNECOLOGY

## 2023-03-09 PROCEDURE — 4010F ACE/ARB THERAPY RXD/TAKEN: CPT | Mod: CPTII,S$GLB,, | Performed by: OBSTETRICS & GYNECOLOGY

## 2023-03-09 PROCEDURE — 3080F DIAST BP >= 90 MM HG: CPT | Mod: CPTII,S$GLB,, | Performed by: OBSTETRICS & GYNECOLOGY

## 2023-03-09 PROCEDURE — 99999 PR PBB SHADOW E&M-EST. PATIENT-LVL III: ICD-10-PCS | Mod: PBBFAC,,, | Performed by: OBSTETRICS & GYNECOLOGY

## 2023-03-09 PROCEDURE — 4010F PR ACE/ARB THEARPY RXD/TAKEN: ICD-10-PCS | Mod: CPTII,S$GLB,, | Performed by: OBSTETRICS & GYNECOLOGY

## 2023-03-09 PROCEDURE — 1160F PR REVIEW ALL MEDS BY PRESCRIBER/CLIN PHARMACIST DOCUMENTED: ICD-10-PCS | Mod: CPTII,S$GLB,, | Performed by: OBSTETRICS & GYNECOLOGY

## 2023-03-09 PROCEDURE — 3080F PR MOST RECENT DIASTOLIC BLOOD PRESSURE >= 90 MM HG: ICD-10-PCS | Mod: CPTII,S$GLB,, | Performed by: OBSTETRICS & GYNECOLOGY

## 2023-03-09 PROCEDURE — 99205 OFFICE O/P NEW HI 60 MIN: CPT | Mod: 57,S$GLB,, | Performed by: OBSTETRICS & GYNECOLOGY

## 2023-03-09 PROCEDURE — 3008F PR BODY MASS INDEX (BMI) DOCUMENTED: ICD-10-PCS | Mod: CPTII,S$GLB,, | Performed by: OBSTETRICS & GYNECOLOGY

## 2023-03-09 PROCEDURE — 3008F BODY MASS INDEX DOCD: CPT | Mod: CPTII,S$GLB,, | Performed by: OBSTETRICS & GYNECOLOGY

## 2023-03-09 PROCEDURE — 99205 PR OFFICE/OUTPT VISIT, NEW, LEVL V, 60-74 MIN: ICD-10-PCS | Mod: 57,S$GLB,, | Performed by: OBSTETRICS & GYNECOLOGY

## 2023-03-09 PROCEDURE — 3077F PR MOST RECENT SYSTOLIC BLOOD PRESSURE >= 140 MM HG: ICD-10-PCS | Mod: CPTII,S$GLB,, | Performed by: OBSTETRICS & GYNECOLOGY

## 2023-03-09 PROCEDURE — 1159F PR MEDICATION LIST DOCUMENTED IN MEDICAL RECORD: ICD-10-PCS | Mod: CPTII,S$GLB,, | Performed by: OBSTETRICS & GYNECOLOGY

## 2023-03-09 RX ORDER — TRAMADOL HYDROCHLORIDE 200 MG/1
TABLET, EXTENDED RELEASE ORAL
COMMUNITY
End: 2023-05-11 | Stop reason: CLARIF

## 2023-03-09 RX ORDER — BUTALBITAL, ACETAMINOPHEN AND CAFFEINE 300; 40; 50 MG/1; MG/1; MG/1
1 CAPSULE ORAL
COMMUNITY
Start: 2023-02-12

## 2023-03-09 RX ORDER — HYDROCODONE BITARTRATE AND ACETAMINOPHEN 5; 325 MG/1; MG/1
1 TABLET ORAL EVERY 12 HOURS PRN
COMMUNITY
Start: 2023-02-20

## 2023-03-09 RX ORDER — TRAMADOL HYDROCHLORIDE 200 MG/1
200 TABLET, EXTENDED RELEASE ORAL
COMMUNITY
Start: 2023-02-06 | End: 2023-05-11 | Stop reason: CLARIF

## 2023-03-09 RX ORDER — TRAMADOL HYDROCHLORIDE 100 MG/1
100 TABLET, EXTENDED RELEASE ORAL
COMMUNITY
Start: 2023-02-03 | End: 2023-05-11 | Stop reason: CLARIF

## 2023-03-09 NOTE — PROGRESS NOTES
Subjective:       Patient ID: Justine Akins is a 32 y.o. female.    Chief Complaint:  Consult and Endometriosis      History of Present Illness  Pt is 32 y.o. with Patient's last menstrual period was 02/21/2023 (exact date). Here in consultation about her endometriosis/pelvic pain.  Pt has a long history of pelvic pain that was superfically treated until she had a recent laparoscopy (2018) that found stage 4 endometriosis.  After surgery, she did use 6 months of lupron (no add back tx).  They tried to order orlissa but it was denied by her insurance at that time.    Pt has never been pregnant.  At the last surgery, she did have an LSO (but a ? Piece of ovary was left behind).  Noted very extensive scar tissue.      Case is complicated by her hx of VTE which does limit some of her treatment options (she used [facility] x 3-6 months).  Her pain has become more severe to where she is missing work monthly.  Had a recent u/s that found a 9-10 cm right endometrioma.  Has seen 4-5 gynecologists and here to see what options we can offer.    Fertility is desired, so she would like to avoid a hysterectomy.      Pelvic Pain  The patient's primary symptoms include pelvic pain. This is a chronic problem. The current episode started more than 1 year ago. The problem occurs constantly. The problem has been rapidly worsening. The pain is severe. The problem affects both sides. She is not pregnant. Associated symptoms include abdominal pain, anorexia, back pain, headaches, nausea, painful intercourse and urgency. Pertinent negatives include no chills, constipation, diarrhea, discolored urine, dysuria, fever, flank pain, frequency, hematuria, rash or vomiting. The symptoms are aggravated by activity, bowel movements, heavy lifting, intercourse, tactile pressure, urinating and menstrual cycle. She has tried acetaminophen, anti-fungal cream, heating pad, NSAIDs, oral narcotics and warm bath for the symptoms. The treatment provided  moderate relief. She is sexually active. No, her partner does not have an STD. Her menstrual history has been irregular. Her past medical history is significant for an abdominal surgery, endometriosis, a gynecological surgery, menorrhagia, metrorrhagia and ovarian cysts. There is no history of a  section, an ectopic pregnancy, herpes simplex, miscarriage, perineal abscess, PID, an STD, a terminated pregnancy or vaginosis.       GYN & OB History  Patient's last menstrual period was 2023 (exact date).   Date of Last Pap: 12/3/2019    OB History   No obstetric history on file.       Review of Systems  Review of Systems   Constitutional:  Negative for chills and fever.   Gastrointestinal:  Positive for abdominal pain, anorexia and nausea. Negative for constipation, diarrhea and vomiting.   Genitourinary:  Positive for menorrhagia, pelvic pain and urgency. Negative for dysuria, flank pain, frequency and hematuria.   Musculoskeletal:  Positive for back pain.   Integumentary:  Negative for rash.   Neurological:  Positive for headaches.         Objective:    Physical Exam:   Constitutional: She is oriented to person, place, and time. She appears well-developed and well-nourished. No distress.    HENT:   Head: Normocephalic and atraumatic.    Eyes: Pupils are equal, round, and reactive to light. Conjunctivae and lids are normal.     Cardiovascular:  Normal rate and regular rhythm.      Exam reveals no edema.        Pulmonary/Chest: Effort normal.        Abdominal: Soft. Bowel sounds are normal. She exhibits no distension. There is no abdominal tenderness. There is no rebound and no guarding.     Genitourinary:    Left adnexa normal.      Pelvic exam was performed with patient supine.   The external female genitalia was normal.   Labial bartholins normal.There is no tenderness or lesion on the right labia. There is no tenderness or lesion on the left labia. Cervix is normal. Right adnexum displays tenderness.  Right adnexum displays no mass. Left adnexum displays no mass and no tenderness. There is tenderness in the vagina. No  no vaginal discharge, rectocele, cystocele or unspecified prolapse of vaginal walls in the vagina. Cervix exhibits no motion tenderness and no discharge. Uterus is fixed. Uterus is not deviated and not hosting fibroids. Normal urethral meatus.Urethral Meatus exhibits: urethral lesionUrethra findings: no tendernessBladder findings: no bladder tenderness          Musculoskeletal: Normal range of motion and moves all extremeties.       Neurological: She is alert and oriented to person, place, and time. She has normal strength.    Skin: Skin is warm and dry.    Psychiatric: She has a normal mood and affect. Her speech is normal and behavior is normal. Thought content normal. Her mood appears not anxious. She does not exhibit a depressed mood. She expresses no suicidal plans and no homicidal plans.        Assessment:        1. Endometrioma of ovary    2. Endometriosis    3. Jesse-Danlos syndrome    4. SLE (systemic lupus erythematosus related syndrome)    5. Fibromyalgia                Plan:       Justine was seen today for consult and endometriosis.    Diagnoses and all orders for this visit:    Endometrioma of ovary  -     Ambulatory referral/consult to Gynecology    Endometriosis  -     Ambulatory referral/consult to Gynecology    Jesse-Danlos syndrome    SLE (systemic lupus erythematosus related syndrome)    Fibromyalgia    We had a very extensive discussion with pt and her partner about the complicated nature of her case.  We discussed how there are medical and surgical options for endometriosis.  Given the large size of her endometrioma, the likelihood of medical therapy resolving this completely is low.  But she is also aware that surgical intervention could lead to compromise of her right ovary.  We are trying to balance the options of a successful one-step surgery versus medicine.  We  discussed how 1 option would be 3 months of Depo Lupron and then to proceed with a diagnostic laparoscopy.  She is aware that at the time of surgery, it may be in the best interest to only drain the endometrioma as removal may compromise the little normal ovarian stroma that she has remaining.  She is aware that drainage of endometrioma can lead to return of the endometrioma.  But we want to balance this with her future desire fertility.  We also discussed proceeding immediately with laparoscopy given her pain levels.  The patient and her partner will discuss the 2 options and notify our office about which way she would like to proceed.  All questions were answered.    I spent a total of 60 minutes on the day of the visit.This includes face to face time and non-face to face time preparing to see the patient (eg, review of tests), obtaining and/or reviewing separately obtained history, documenting clinical information in the electronic or other health record, independently interpreting results and communicating results to the patient/family/caregiver, or care coordinator.

## 2023-03-12 ENCOUNTER — PATIENT MESSAGE (OUTPATIENT)
Dept: OBSTETRICS AND GYNECOLOGY | Facility: CLINIC | Age: 33
End: 2023-03-12
Payer: COMMERCIAL

## 2023-03-14 NOTE — PROGRESS NOTES
Subjective:          Chief Complaint: Justine Akins is a 32 y.o. female who had no chief complaint listed for this encounter.    HPI:  3/15/23:   Patient continues with endometriosis rather severe pain and subsequent complications. She has complicated recent finding with matted adhesions that she is considering next step and trying to salvage fertility in the process.  Recently seen with Dr. Bernstein plan chosen is 8 weeks Lupron and then attempt at [facility]. Consideration for Orliss vs progestin only until  cleared to start fertility therapy.     Recall she has a hx of PE likely provoked with OCPs and heterozygosity to MTHFR. Last known hematologist: Dr. Castro. Patient did use Lovenox with Lupron in the past.   Historically negative APS.   With regard to SLE she was started at outside [facility] ([place]) on HCQ, continued in [place] with Dr. Fuentes and then last seen by me 2019 continued HCQ 200mg daily at that time.   Her serologies are rather bland: weakly +MADYSON, MADYSON profile negative. No hx of NS SLE, no nephritis, pleuropericarditis. She was able to get HCQ with her PCP intermittently but this seems to be working for her. GI upset with BID.   Since last visit- No oral or nasal ulcerations, no anemia, cytopenia.  No incidence of nephritis hematuria.  No pleural pericarditis. She does appreciate some joint stiffness     Patient was dx with [facility] with EDS- with Dr. Decker- no specific genetics done. Diagnosed with Dysautonomia and managing this.   Pain Management: Dr. Altamirano FMS/pain/migraine and this is going very well for her. Tramadol is working with Fiorocet.   Patient with left infraumbilical paplable nodule- CT ABD -indeterminant.  Not tender. No skin changes.       Previous Rheum Hx:   She is a 28-year-old female she is past medical history for arthritis, lupus,FMS migraines, hypothyroid she's had a surgery of the ankle right, knee right x3.  Subsequently had a BIO Replacement in 2013.  Previously seen by rheumatologist in  [place]. Diagnosed 2013. Has been treated with hydroxychloroquine sinceDrCarrie Fuentes in [place] about 2014.  Started with rashes, FUO. Joints are stiff 1st thing in the morning.   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 Hernandez in [place] is Ortho.   SLE: +MADYSON in past. +PE attributed to OCP use,  +Livedo reticularis. +MTHFR gene but normal homocysteine. , negative APS with Rheum. No protein C and S, factor V abnormality  She continues with Plaquenil 400mg once daily  She has undergone  various surgeries for endometrial ablation in endometriosis she has had unilateral oophorectomy with Dr. Leon. Off Lupron approx 7/2019. Has had 2 periods since and having increased menstrual pain. She did have CT Abdomen. Pending eval with endometriosis specialist.   Patient with +LBP notes severe, exacerbated with standing or sitting for prolonged positions. Non inflammatory  C/o "legs vibrating" no specific pattern of standing or sitting.   Cold sensation but feet w/o cyanosis/pallor.   RLS at night. Heel pain   ++fatigue.   Notes she has to stay hydrated or she develops some spots in her vision. Recent episode with bradycardia and now tachycardia.   Failed: elavil, nortryptylline and gabapentin all with ASE of feeling could not breathe.   Did not tolerate Cymbalta felt altered.   No effexor   She stopped Lyrica 25mg BID last spring with similar SOB feeling.        She is having trouble with her prior left knee replacement following with Dr. Ponce.   She has been on Lupron monthly for 6 months now with widespread MSK pain, myalgias.  Now off.   Generalized body aches, cervicalgia, and arthralgias  No swelling that she can appreciate.   No rashes. Patient denies weight loss, rashes, dry eye, dry mouth, nasal or palatal ulcerations,  lymphadenopathy, Raynaud's, hx of DVT/miscarriages, psoriasis or family hx of psoriasis, rashes, serositis, anemia or other constitutional symptoms.  She does " have a history of the MTHFR mutation.      [facility]-Dr. Baez Plan for Lovenox/heparin for any future pregnancy.     Component      Latest Ref Rng & Units 2019           1:55 PM  1:55 PM   Zamora (YVONNE) Ab, IgG      0 - 40 AU/mL 0 0   RNP Antibody      0 - 40 AU/mL  1   SSA Antibody      0 - 40 AU/mL  7   SSA 60 (Ro) YVONNE Antibody      0 - 40 AU/mL  1   MADYSON Screen      None Detected  None Detected   Anti-Histone Antibody      0.0 - 0.9 Units  0.2   Complement (C-3)      50 - 180 mg/dL  138   Complement (C-4)      11 - 44 mg/dL  35   SSB Antibody      0 - 40 AU/mL  0   Compl, Total (CH50)      60 - 144  Units  108   Sed Rate      0 - 19 mm/Hr  12   CRP      0.00 - 0.90 mg/dL  1.00 (H)   Anti-dsDNA Ab by IFA      <1:10  <1:10     REVIEW OF SYSTEMS:    Review of Systems   Constitutional:  Positive for malaise/fatigue. Negative for fever and weight loss.   HENT:  Negative for sore throat.    Eyes:  Negative for double vision, photophobia and redness.   Respiratory:  Negative for cough, shortness of breath and wheezing.    Cardiovascular:  Negative for chest pain, palpitations and orthopnea.   Gastrointestinal:  Negative for abdominal pain, constipation and diarrhea.   Genitourinary:  Negative for dysuria, hematuria and urgency.   Musculoskeletal:  Positive for joint pain and myalgias. Negative for back pain.   Skin:  Negative for rash.   Neurological:  Negative for dizziness, tingling, focal weakness and headaches.   Endo/Heme/Allergies:  Does not bruise/bleed easily.   Psychiatric/Behavioral:  Negative for depression, hallucinations and suicidal ideas.              Objective:            Past Medical History:   Diagnosis Date    Anesthesia complication     pt states she needs a pediatric ET tube used due to past airway complication (trauma from tube)    Arthritis     rhuematoid arthritis, osteoarthritis    Asthma     Chronic nausea     Dysautonomia     Jesse-Danlos disease     Endometriosis     GERD  (gastroesophageal reflux disease)     History of kidney stones     Hypertension     Lupus     Migraine headache     MTHFR mutation     Ovarian cyst     Panic attack as reaction to stress     POTS (postural orthostatic tachycardia syndrome)     Pulmonary embolism 2015    Respiratory distress     Thyroid disease     hypothyroid     Family History   Problem Relation Age of Onset    Diabetes Mother     Ovarian cancer Mother     Fibromyalgia Mother     Heart disease Father     Diabetes Father     Heart attack Father     Hyperlipidemia Father      Social History     Tobacco Use    Smoking status: Never    Smokeless tobacco: Never   Substance Use Topics    Alcohol use: No    Drug use: No         Current Outpatient Medications on File Prior to Visit   Medication Sig Dispense Refill    albuterol-ipratropium (DUO-NEB) 2.5 mg-0.5 mg/3 mL nebulizer solution Take 3 mLs by nebulization every 6 (six) hours as needed for Wheezing. Rescue 1 Box 2    aspirin (ECOTRIN) 81 MG EC tablet Take 81 mg by mouth once daily.      budesonide (PULMICORT) 0.5 mg/2 mL nebulizer solution Take 2 mLs (0.5 mg total) by nebulization once daily. Controller  0    butalbital-acetaminophen-caff -40 mg Cap Take 1 capsule by mouth.      cholecalciferol, vitamin D3, 5,000 unit Tab Take 5,000 Units by mouth once daily.       fish oil-omega-3 fatty acids 300-1,000 mg capsule Take by mouth once daily.      HYDROcodone-acetaminophen (NORCO) 5-325 mg per tablet Take 1 tablet by mouth every 12 (twelve) hours as needed.      hydrOXYchloroQUINE (PLAQUENIL) 200 mg tablet Take 200 mg by mouth once daily.      losartan (COZAAR) 25 MG tablet Take 1 tablet (25 mg total) by mouth once daily. 90 tablet 3    magnesium oxide (MAG-OX) 400 mg (241.3 mg magnesium) tablet Take 400 mg by mouth once daily.      ondansetron (ZOFRAN-ODT) 4 MG TbDL DISSOLVE 1 TABLET (4 MG TOTAL) UNDER THE TONGUE EVERY 8 (EIGHT) HOURS AS NEEDED. 12 tablet 1    traMADoL (ULTRAM) 50 mg tablet TAKE  1 TABLET BY MOUTH EVERY 6 HOURS AS NEEDED FOR PAIN 30 tablet 0    traMADoL (ULTRAM-ER) 100 MG Tb24 Take 100 mg by mouth.      traMADoL (ULTRAM-ER) 200 MG Tb24       traMADoL (ULTRAM-ER) 200 MG Tb24 Take 200 mg by mouth.       No current facility-administered medications on file prior to visit.       There were no vitals filed for this visit.      Physical Exam:    Physical Exam  Constitutional:       Appearance: Normal appearance. She is well-developed.   HENT:      Nose: No septal deviation.      Mouth/Throat:      Mouth: No oral lesions.   Eyes:      Conjunctiva/sclera:      Right eye: Right conjunctiva is not injected.      Left eye: Left conjunctiva is not injected.      Pupils: Pupils are equal, round, and reactive to light.   Neck:      Thyroid: No thyroid mass or thyromegaly.      Vascular: No JVD.   Cardiovascular:      Rate and Rhythm: Normal rate and regular rhythm.      Pulses: Normal pulses.      Comments: No edema  Pulmonary:      Effort: Pulmonary effort is normal.      Breath sounds: Normal breath sounds.   Abdominal:      Palpations: Abdomen is soft.   Musculoskeletal:      Right shoulder: No swelling or tenderness. Normal range of motion.      Left shoulder: No swelling or tenderness. Normal range of motion.      Right elbow: No swelling. Normal range of motion. No tenderness.      Left elbow: No swelling. Normal range of motion. No tenderness.      Right wrist: No swelling or tenderness. Normal range of motion.      Left wrist: No swelling or tenderness. Normal range of motion.      Right hand: Tenderness present. No swelling. Decreased range of motion.      Left hand: Tenderness present. No swelling. Decreased range of motion.      Right hip: Normal range of motion. Normal strength.      Left hip: No tenderness. Normal range of motion.      Right knee: No swelling. Normal range of motion. Tenderness present over the medial joint line.      Left knee: Swelling present. Decreased range of motion.  Tenderness present over the medial joint line.      Right ankle: No swelling. No tenderness. Normal range of motion.      Left ankle: No swelling. No tenderness. Normal range of motion.      Right foot: Swelling and tenderness present.      Left foot: Swelling and tenderness present.      Comments: 5/5 upper and lower extremity bilateral muscle strength   Lymphadenopathy:      Cervical: No cervical adenopathy.   Skin:     General: Skin is dry.      Comments: Small papable pea sized nodule left lateral to the umibilicus just superficial of the ABD muscle. Freely mobile, non tender.    Neurological:      Deep Tendon Reflexes: Reflexes are normal and symmetric.             Assessment:       Encounter Diagnoses   Name Primary?    Undifferentiated connective tissue disease Yes    SLE (systemic lupus erythematosus related syndrome)     MADYSON positive     Fibromyalgia     Endometriosis     Long-term use of Plaquenil             Plan:        Undifferentiated connective tissue disease  -     C-Reactive Protein; Future; Expected date: 03/15/2023  -     Anti-DNA Ab, Double-Stranded; Future; Expected date: 03/15/2023  -     MADYSON Screen w/Reflex; Future; Expected date: 03/15/2023  -     Anti Sm/RNP Antibody; Future; Expected date: 03/15/2023  -     Anti-Smith Antibody; Future; Expected date: 03/15/2023  -     Anti-Histone Antibody; Future; Expected date: 03/15/2023  -     C3 Complement; Future; Expected date: 03/15/2023  -     C4 Complement; Future; Expected date: 03/15/2023  -     Sjogrens syndrome-A extractable nuclear antibody; Future; Expected date: 03/15/2023  -     Sjogrens syndrome-B extractable nuclear antibody; Future; Expected date: 03/15/2023    SLE (systemic lupus erythematosus related syndrome)  Comments:  historically diagnoses continued on HCQ with subsequent negative MADYSON. previous +MADYSON low titer. no end organ dz.     MADYSON positive  -     C-Reactive Protein; Future; Expected date: 03/15/2023  -     Anti-DNA Ab,  Double-Stranded; Future; Expected date: 03/15/2023  -     MADYSON Screen w/Reflex; Future; Expected date: 03/15/2023  -     Anti Sm/RNP Antibody; Future; Expected date: 03/15/2023  -     Anti-Smith Antibody; Future; Expected date: 03/15/2023  -     Anti-Histone Antibody; Future; Expected date: 03/15/2023  -     C3 Complement; Future; Expected date: 03/15/2023  -     C4 Complement; Future; Expected date: 03/15/2023  -     Sjogrens syndrome-A extractable nuclear antibody; Future; Expected date: 03/15/2023  -     Sjogrens syndrome-B extractable nuclear antibody; Future; Expected date: 03/15/2023    Fibromyalgia    Endometriosis    Long-term use of Plaquenil        Patient with hx of UCTD/SLE, +rash and joint swelling, +MADYSON historically . Stable on HCQ 200mg in AM no maculopathy. +livedo, hx of malar rash, no nephritis. Patient does not appear to be clinically active with SLE in the time I have known her.    -continue HCQ at 200mg daily.    -Update serologies.    -must remain UTD on eye exams.       WPI 9/SSS 10 with features of FMS   -failed elavil, pamelor, cymbalta, gabapentin and  Lyrica.    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 YVONNE Decker and pain management.        Discussed the risks benefits and side effects of hydroxychloroquine including but not limited to retinal toxicity, rashes, nausea.  Patient is aware of the monitoring requirements of an annual eye exam will have this done following a trial to see if  tolerates the medication.    No follow-ups on file.      Thank you for allowing me to participate in the care of this very pleasant patient.    75 min consultation with greater than 50% of that time included Preparing to see the patient (review records, tests), Obtaining and/or reviewing separately obtained historical data, Performing a medically appropriate examination and/or evaluation , Ordering medications, tests, and/or procedures,  Referring and communicating with other healthcare professionals , Documenting clinical information in the electronic or other health record and Independently interpreting results  (as warranted) & communicating results to the patient/family/caregiver. All questions answered.

## 2023-03-15 ENCOUNTER — OFFICE VISIT (OUTPATIENT)
Dept: RHEUMATOLOGY | Facility: CLINIC | Age: 33
End: 2023-03-15
Payer: COMMERCIAL

## 2023-03-15 VITALS
HEART RATE: 92 BPM | BODY MASS INDEX: 30.74 KG/M2 | WEIGHT: 162.81 LBS | SYSTOLIC BLOOD PRESSURE: 153 MMHG | HEIGHT: 61 IN | DIASTOLIC BLOOD PRESSURE: 97 MMHG

## 2023-03-15 DIAGNOSIS — Z79.899 LONG-TERM USE OF PLAQUENIL: ICD-10-CM

## 2023-03-15 DIAGNOSIS — R76.8 ANA POSITIVE: ICD-10-CM

## 2023-03-15 DIAGNOSIS — M79.7 FIBROMYALGIA: ICD-10-CM

## 2023-03-15 DIAGNOSIS — N80.9 ENDOMETRIOSIS: ICD-10-CM

## 2023-03-15 DIAGNOSIS — M35.9 UNDIFFERENTIATED CONNECTIVE TISSUE DISEASE: Primary | ICD-10-CM

## 2023-03-15 DIAGNOSIS — M32.9 SLE (SYSTEMIC LUPUS ERYTHEMATOSUS RELATED SYNDROME): ICD-10-CM

## 2023-03-15 PROBLEM — D84.821 IMMUNOCOMPROMISED STATE DUE TO DRUG THERAPY: Status: RESOLVED | Noted: 2022-11-04 | Resolved: 2023-03-15

## 2023-03-15 PROCEDURE — 99999 PR PBB SHADOW E&M-EST. PATIENT-LVL IV: ICD-10-PCS | Mod: PBBFAC,,, | Performed by: INTERNAL MEDICINE

## 2023-03-15 PROCEDURE — 3080F DIAST BP >= 90 MM HG: CPT | Mod: CPTII,S$GLB,, | Performed by: INTERNAL MEDICINE

## 2023-03-15 PROCEDURE — 1160F RVW MEDS BY RX/DR IN RCRD: CPT | Mod: CPTII,S$GLB,, | Performed by: INTERNAL MEDICINE

## 2023-03-15 PROCEDURE — 3080F PR MOST RECENT DIASTOLIC BLOOD PRESSURE >= 90 MM HG: ICD-10-PCS | Mod: CPTII,S$GLB,, | Performed by: INTERNAL MEDICINE

## 2023-03-15 PROCEDURE — 99417 PROLNG OP E/M EACH 15 MIN: CPT | Mod: S$GLB,,, | Performed by: INTERNAL MEDICINE

## 2023-03-15 PROCEDURE — 1159F PR MEDICATION LIST DOCUMENTED IN MEDICAL RECORD: ICD-10-PCS | Mod: CPTII,S$GLB,, | Performed by: INTERNAL MEDICINE

## 2023-03-15 PROCEDURE — 99215 PR OFFICE/OUTPT VISIT, EST, LEVL V, 40-54 MIN: ICD-10-PCS | Mod: S$GLB,,, | Performed by: INTERNAL MEDICINE

## 2023-03-15 PROCEDURE — 99417 PR PROLONGED SVC, OUTPT, W/WO DIRECT PT CONTACT,  EA ADDTL 15 MIN: ICD-10-PCS | Mod: S$GLB,,, | Performed by: INTERNAL MEDICINE

## 2023-03-15 PROCEDURE — 3077F SYST BP >= 140 MM HG: CPT | Mod: CPTII,S$GLB,, | Performed by: INTERNAL MEDICINE

## 2023-03-15 PROCEDURE — 3077F PR MOST RECENT SYSTOLIC BLOOD PRESSURE >= 140 MM HG: ICD-10-PCS | Mod: CPTII,S$GLB,, | Performed by: INTERNAL MEDICINE

## 2023-03-15 PROCEDURE — 3008F PR BODY MASS INDEX (BMI) DOCUMENTED: ICD-10-PCS | Mod: CPTII,S$GLB,, | Performed by: INTERNAL MEDICINE

## 2023-03-15 PROCEDURE — 1160F PR REVIEW ALL MEDS BY PRESCRIBER/CLIN PHARMACIST DOCUMENTED: ICD-10-PCS | Mod: CPTII,S$GLB,, | Performed by: INTERNAL MEDICINE

## 2023-03-15 PROCEDURE — 3008F BODY MASS INDEX DOCD: CPT | Mod: CPTII,S$GLB,, | Performed by: INTERNAL MEDICINE

## 2023-03-15 PROCEDURE — 4010F PR ACE/ARB THEARPY RXD/TAKEN: ICD-10-PCS | Mod: CPTII,S$GLB,, | Performed by: INTERNAL MEDICINE

## 2023-03-15 PROCEDURE — 99999 PR PBB SHADOW E&M-EST. PATIENT-LVL IV: CPT | Mod: PBBFAC,,, | Performed by: INTERNAL MEDICINE

## 2023-03-15 PROCEDURE — 4010F ACE/ARB THERAPY RXD/TAKEN: CPT | Mod: CPTII,S$GLB,, | Performed by: INTERNAL MEDICINE

## 2023-03-15 PROCEDURE — 99215 OFFICE O/P EST HI 40 MIN: CPT | Mod: S$GLB,,, | Performed by: INTERNAL MEDICINE

## 2023-03-15 PROCEDURE — 1159F MED LIST DOCD IN RCRD: CPT | Mod: CPTII,S$GLB,, | Performed by: INTERNAL MEDICINE

## 2023-03-27 ENCOUNTER — PATIENT MESSAGE (OUTPATIENT)
Dept: OBSTETRICS AND GYNECOLOGY | Facility: CLINIC | Age: 33
End: 2023-03-27
Payer: COMMERCIAL

## 2023-03-27 ENCOUNTER — PATIENT MESSAGE (OUTPATIENT)
Dept: RHEUMATOLOGY | Facility: CLINIC | Age: 33
End: 2023-03-27
Payer: COMMERCIAL

## 2023-03-30 ENCOUNTER — TELEPHONE (OUTPATIENT)
Dept: INFUSION THERAPY | Facility: OTHER | Age: 33
End: 2023-03-30
Payer: COMMERCIAL

## 2023-03-31 ENCOUNTER — TELEPHONE (OUTPATIENT)
Dept: INFUSION THERAPY | Facility: OTHER | Age: 33
End: 2023-03-31
Payer: COMMERCIAL

## 2023-03-31 NOTE — TELEPHONE ENCOUNTER
Returned call and schedule her Lupron injection. Questions asked and answered.     ----- Message from Love Olivares sent at 3/30/2023  4:12 PM CDT -----  Name of Who is Calling: VALENTINE SHARMA [[id number]]              What is the request in detail: Patient requesting a call back to discuss schedule injection              Can the clinic reply by [facility]: No              What Number to Call Back if not in [facility]: [phone]

## 2023-04-04 ENCOUNTER — PATIENT MESSAGE (OUTPATIENT)
Dept: FAMILY MEDICINE | Facility: CLINIC | Age: 33
End: 2023-04-04
Payer: COMMERCIAL

## 2023-04-04 ENCOUNTER — INFUSION (OUTPATIENT)
Dept: INFUSION THERAPY | Facility: OTHER | Age: 33
End: 2023-04-04
Attending: INTERNAL MEDICINE
Payer: COMMERCIAL

## 2023-04-04 VITALS
SYSTOLIC BLOOD PRESSURE: 154 MMHG | RESPIRATION RATE: 18 BRPM | DIASTOLIC BLOOD PRESSURE: 96 MMHG | TEMPERATURE: 98 F | HEART RATE: 92 BPM | OXYGEN SATURATION: 100 %

## 2023-04-04 DIAGNOSIS — I26.99 PULMONARY THROMBOEMBOLISM: ICD-10-CM

## 2023-04-04 DIAGNOSIS — N80.129 ENDOMETRIOMA OF OVARY: Primary | ICD-10-CM

## 2023-04-04 DIAGNOSIS — M32.9 SLE (SYSTEMIC LUPUS ERYTHEMATOSUS RELATED SYNDROME): ICD-10-CM

## 2023-04-04 DIAGNOSIS — M32.9 SLE (SYSTEMIC LUPUS ERYTHEMATOSUS RELATED SYNDROME): Primary | ICD-10-CM

## 2023-04-04 PROCEDURE — 96402 CHEMO HORMON ANTINEOPL SQ/IM: CPT

## 2023-04-04 PROCEDURE — 63600175 PHARM REV CODE 636 W HCPCS: Mod: JZ,JG | Performed by: OBSTETRICS & GYNECOLOGY

## 2023-04-04 RX ORDER — ENOXAPARIN SODIUM 100 MG/ML
60 INJECTION SUBCUTANEOUS DAILY
Qty: 18 ML | Refills: 3 | Status: SHIPPED | OUTPATIENT
Start: 2023-04-04 | End: 2023-04-05

## 2023-04-04 RX ADMIN — LEUPROLIDE ACETATE 11.25 MG: KIT at 02:04

## 2023-04-04 NOTE — PLAN OF CARE
PT updated on plan of care. Verbalized understanding on plan.  Updated on any issues.  All needs met at this time.  Encouraged to notify nurse of needs.

## 2023-04-04 NOTE — PLAN OF CARE
Vital Signs Stable, No apparent distress noted; Pt tolerated _Lupron  w/o difficulty.  No bleeding,swelling or drainage noted to the site;bandaid applied .  Discharge instructions given;all questions answered;Pt verbalizes understanding.   Follow-up appts per kavithat; ambulated from clinic in NAD, accompanied by

## 2023-04-05 ENCOUNTER — PATIENT MESSAGE (OUTPATIENT)
Dept: FAMILY MEDICINE | Facility: CLINIC | Age: 33
End: 2023-04-05
Payer: COMMERCIAL

## 2023-04-05 DIAGNOSIS — K29.70 GASTRITIS WITHOUT BLEEDING, UNSPECIFIED CHRONICITY, UNSPECIFIED GASTRITIS TYPE: ICD-10-CM

## 2023-04-05 RX ORDER — ENOXAPARIN SODIUM 100 MG/ML
40 INJECTION SUBCUTANEOUS DAILY
Qty: 12 ML | Refills: 3 | Status: SHIPPED | OUTPATIENT
Start: 2023-04-05 | End: 2024-04-04

## 2023-04-06 RX ORDER — ONDANSETRON 4 MG/1
4 TABLET, ORALLY DISINTEGRATING ORAL EVERY 6 HOURS PRN
Qty: 30 TABLET | Refills: 1 | Status: SHIPPED | OUTPATIENT
Start: 2023-04-06 | End: 2024-01-07 | Stop reason: SDUPTHER

## 2023-04-10 ENCOUNTER — PATIENT MESSAGE (OUTPATIENT)
Dept: OBSTETRICS AND GYNECOLOGY | Facility: CLINIC | Age: 33
End: 2023-04-10
Payer: COMMERCIAL

## 2023-04-18 ENCOUNTER — TELEPHONE (OUTPATIENT)
Dept: OBSTETRICS AND GYNECOLOGY | Facility: CLINIC | Age: 33
End: 2023-04-18
Payer: COMMERCIAL

## 2023-04-20 ENCOUNTER — TELEPHONE (OUTPATIENT)
Dept: OBSTETRICS AND GYNECOLOGY | Facility: CLINIC | Age: 33
End: 2023-04-20
Payer: COMMERCIAL

## 2023-04-20 NOTE — TELEPHONE ENCOUNTER
----- Message from Kiana Traore sent at 4/20/2023 11:41 AM CDT -----  Contact: VALENTINE SHARMA [[id number]]  Type:  Patient Returning Call      Who Called:   VALENTINE SHARMA [[id number]]      Who Left Message for Patient: Marely Ca MA      Does the patient know what this is regarding?: Yes      Would the patient rather a call back or a response via [facility]? Call back        Best Call Back Number:  [phone] (home)       Additional Information:

## 2023-04-24 ENCOUNTER — TELEPHONE (OUTPATIENT)
Dept: OBSTETRICS AND GYNECOLOGY | Facility: CLINIC | Age: 33
End: 2023-04-24
Payer: COMMERCIAL

## 2023-04-24 DIAGNOSIS — N80.129 ENDOMETRIOMA OF OVARY: Primary | ICD-10-CM

## 2023-05-04 ENCOUNTER — PATIENT MESSAGE (OUTPATIENT)
Dept: OBSTETRICS AND GYNECOLOGY | Facility: CLINIC | Age: 33
End: 2023-05-04
Payer: COMMERCIAL

## 2023-05-11 ENCOUNTER — ANESTHESIA EVENT (OUTPATIENT)
Dept: SURGERY | Facility: OTHER | Age: 33
End: 2023-05-11
Payer: COMMERCIAL

## 2023-05-11 ENCOUNTER — PATIENT MESSAGE (OUTPATIENT)
Dept: FAMILY MEDICINE | Facility: CLINIC | Age: 33
End: 2023-05-11
Payer: COMMERCIAL

## 2023-05-11 ENCOUNTER — OFFICE VISIT (OUTPATIENT)
Dept: OBSTETRICS AND GYNECOLOGY | Facility: CLINIC | Age: 33
End: 2023-05-11
Payer: COMMERCIAL

## 2023-05-11 ENCOUNTER — PATIENT MESSAGE (OUTPATIENT)
Dept: SURGERY | Facility: OTHER | Age: 33
End: 2023-05-11
Payer: COMMERCIAL

## 2023-05-11 ENCOUNTER — HOSPITAL ENCOUNTER (OUTPATIENT)
Dept: PREADMISSION TESTING | Facility: OTHER | Age: 33
Discharge: HOME | End: 2023-05-11
Attending: OBSTETRICS & GYNECOLOGY
Payer: COMMERCIAL

## 2023-05-11 VITALS
SYSTOLIC BLOOD PRESSURE: 130 MMHG | WEIGHT: 159.38 LBS | HEIGHT: 61 IN | DIASTOLIC BLOOD PRESSURE: 82 MMHG | BODY MASS INDEX: 30.09 KG/M2

## 2023-05-11 VITALS
HEIGHT: 61 IN | TEMPERATURE: 98 F | HEART RATE: 74 BPM | RESPIRATION RATE: 16 BRPM | BODY MASS INDEX: 30.02 KG/M2 | SYSTOLIC BLOOD PRESSURE: 149 MMHG | OXYGEN SATURATION: 100 % | DIASTOLIC BLOOD PRESSURE: 90 MMHG | WEIGHT: 159 LBS

## 2023-05-11 DIAGNOSIS — R10.31 CHRONIC RLQ PAIN: ICD-10-CM

## 2023-05-11 DIAGNOSIS — N80.129 ENDOMETRIOMA OF OVARY: Primary | ICD-10-CM

## 2023-05-11 DIAGNOSIS — Z01.818 PREOP TESTING: Primary | ICD-10-CM

## 2023-05-11 DIAGNOSIS — G89.29 CHRONIC RLQ PAIN: ICD-10-CM

## 2023-05-11 LAB
ABO + RH BLD: NORMAL
BASOPHILS # BLD AUTO: 0.05 K/UL (ref 0–0.2)
BASOPHILS NFR BLD: 0.9 % (ref 0–1.9)
BLD GP AB SCN CELLS X3 SERPL QL: NORMAL
DIFFERENTIAL METHOD: NORMAL
EOSINOPHIL # BLD AUTO: 0.2 K/UL (ref 0–0.5)
EOSINOPHIL NFR BLD: 2.8 % (ref 0–8)
ERYTHROCYTE [DISTWIDTH] IN BLOOD BY AUTOMATED COUNT: 12.2 % (ref 11.5–14.5)
HCT VFR BLD AUTO: 44.5 % (ref 37–48.5)
HGB BLD-MCNC: 14.3 G/DL (ref 12–16)
IMM GRANULOCYTES # BLD AUTO: 0.01 K/UL (ref 0–0.04)
IMM GRANULOCYTES NFR BLD AUTO: 0.2 % (ref 0–0.5)
LYMPHOCYTES # BLD AUTO: 1.9 K/UL (ref 1–4.8)
LYMPHOCYTES NFR BLD: 33.1 % (ref 18–48)
MCH RBC QN AUTO: 29.2 PG (ref 27–31)
MCHC RBC AUTO-ENTMCNC: 32.1 G/DL (ref 32–36)
MCV RBC AUTO: 91 FL (ref 82–98)
MONOCYTES # BLD AUTO: 0.5 K/UL (ref 0.3–1)
MONOCYTES NFR BLD: 9.2 % (ref 4–15)
NEUTROPHILS # BLD AUTO: 3.1 K/UL (ref 1.8–7.7)
NEUTROPHILS NFR BLD: 53.8 % (ref 38–73)
NRBC BLD-RTO: 0 /100 WBC
PLATELET # BLD AUTO: 251 K/UL (ref 150–450)
PMV BLD AUTO: 10.3 FL (ref 9.2–12.9)
RBC # BLD AUTO: 4.9 M/UL (ref 4–5.4)
SPECIMEN OUTDATE: NORMAL
WBC # BLD AUTO: 5.68 K/UL (ref 3.9–12.7)

## 2023-05-11 PROCEDURE — 85025 COMPLETE CBC W/AUTO DIFF WBC: CPT | Performed by: ANESTHESIOLOGY

## 2023-05-11 PROCEDURE — 99499 UNLISTED E&M SERVICE: CPT | Mod: S$GLB,,, | Performed by: OBSTETRICS & GYNECOLOGY

## 2023-05-11 PROCEDURE — 36415 COLL VENOUS BLD VENIPUNCTURE: CPT | Performed by: ANESTHESIOLOGY

## 2023-05-11 PROCEDURE — 99999 PR PBB SHADOW E&M-EST. PATIENT-LVL III: ICD-10-PCS | Mod: PBBFAC,,, | Performed by: OBSTETRICS & GYNECOLOGY

## 2023-05-11 PROCEDURE — 86900 BLOOD TYPING SEROLOGIC ABO: CPT | Performed by: ANESTHESIOLOGY

## 2023-05-11 PROCEDURE — 99999 PR PBB SHADOW E&M-EST. PATIENT-LVL III: CPT | Mod: PBBFAC,,, | Performed by: OBSTETRICS & GYNECOLOGY

## 2023-05-11 PROCEDURE — 99499 NO LOS: ICD-10-PCS | Mod: S$GLB,,, | Performed by: OBSTETRICS & GYNECOLOGY

## 2023-05-11 RX ORDER — MUPIROCIN 20 MG/G
OINTMENT TOPICAL
Status: CANCELLED | OUTPATIENT
Start: 2023-05-11

## 2023-05-11 RX ORDER — ACETAMINOPHEN 500 MG
1000 TABLET ORAL
Status: CANCELLED | OUTPATIENT
Start: 2023-05-11 | End: 2023-05-11

## 2023-05-11 RX ORDER — OXYCODONE HYDROCHLORIDE 5 MG/1
5 TABLET ORAL
Status: CANCELLED | OUTPATIENT
Start: 2023-05-11

## 2023-05-11 RX ORDER — PROCHLORPERAZINE EDISYLATE 5 MG/ML
5 INJECTION INTRAMUSCULAR; INTRAVENOUS EVERY 30 MIN PRN
Status: CANCELLED | OUTPATIENT
Start: 2023-05-11

## 2023-05-11 RX ORDER — FAMOTIDINE 20 MG/1
20 TABLET, FILM COATED ORAL
Status: CANCELLED | OUTPATIENT
Start: 2023-05-11

## 2023-05-11 RX ORDER — HYDROMORPHONE HYDROCHLORIDE 2 MG/ML
0.4 INJECTION, SOLUTION INTRAMUSCULAR; INTRAVENOUS; SUBCUTANEOUS EVERY 5 MIN PRN
Status: CANCELLED | OUTPATIENT
Start: 2023-05-11

## 2023-05-11 RX ORDER — SODIUM CHLORIDE 0.9 % (FLUSH) 0.9 %
3 SYRINGE (ML) INJECTION
Status: CANCELLED | OUTPATIENT
Start: 2023-05-11

## 2023-05-11 RX ORDER — SODIUM CHLORIDE, SODIUM LACTATE, POTASSIUM CHLORIDE, CALCIUM CHLORIDE 600; 310; 30; 20 MG/100ML; MG/100ML; MG/100ML; MG/100ML
INJECTION, SOLUTION INTRAVENOUS CONTINUOUS
Status: CANCELLED | OUTPATIENT
Start: 2023-05-11

## 2023-05-11 RX ORDER — MEPERIDINE HYDROCHLORIDE 25 MG/ML
12.5 INJECTION INTRAMUSCULAR; INTRAVENOUS; SUBCUTANEOUS ONCE AS NEEDED
Status: CANCELLED | OUTPATIENT
Start: 2023-05-11 | End: 2023-05-12

## 2023-05-11 RX ORDER — SODIUM CHLORIDE 9 MG/ML
INJECTION, SOLUTION INTRAVENOUS CONTINUOUS
Status: CANCELLED | OUTPATIENT
Start: 2023-05-11

## 2023-05-11 NOTE — DISCHARGE INSTRUCTIONS
Information to Prepare you for your Surgery    PRE-ADMIT TESTING -  [phone]    [address] [facility]          Your surgery has been scheduled at [facility]. We are pleased to have the opportunity to serve you. For Further Information please call [phone].    On the day of surgery please report to the Information Desk on the 1st floor.    CONTACT YOUR PHYSICIAN'S OFFICE THE DAY PRIOR TO YOUR SURGERY TO OBTAIN YOUR ARRIVAL TIME.     The evening before surgery do not eat anything after 9 p.m. ( this includes hard candy, chewing gum and mints).  You may only have GATORADE, POWERADE AND WATER  from 9 p.m. until you leave your home.   DO NOT DRINK ANY LIQUIDS ON THE WAY TO THE HOSPITAL.      Why does your anesthesiologist allow you to drink Gatorade/Powerade before surgery?  Gatorade/Powerade helps to increase your comfort before surgery and to decrease your nausea after surgery. The carbohydrates in Gatorade/Powerade help reduce your body's stress response to surgery.  If you are a diabetic-drink only water prior to surgery.       Patients may have 2 visitors pre and post procedure. Only 2 visitors will be allowed in the Surgical building with the patient. No one under the age of 12 will be allowed into the facility.    SPECIAL MEDICATION INSTRUCTIONS: TAKE medications checked off by the Anesthesiologist on your Medication List.    Angiogram Patients: Take medications as instructed by your physician, including aspirin.     Surgery Patients:    If you take ASPIRIN - Your PHYSICIAN/SURGEON will need to inform you IF/OR when you need to stop taking aspirin prior to your surgery.     The week prior to surgery do not ot take any medications containing IBUPROFEN or NSAIDS ( Advil, Motrin, Goodys, BC, Aleve, Naproxen etc) If you are not sure if you should take a medicine please call your surgeon's office.  Ok to take Tylenol    Do Not Wear any make-up  (especially eye make-up) to surgery. Please remove any false eyelashes or eyelash extensions. If you arrive the day of surgery with makeup/eyelashes on you will be required to remove prior to surgery. (There is a risk of corneal abrasions if eye makeup/eyelash extensions are not removed)      Leave all valuables at home.   Do Not wear any jewelry or watches, including any metal in body piercings. Jewelry must be removed prior to coming to the hospital.  There is a possibility that rings that are unable to be removed may be cut off if they are on the surgical extremity.    Please remove all hair extensions, wigs, clips and any other metal accessories/ ornaments from your hair.  These items may pose a flammable/fire risk in Surgery and must be removed.    Do not shave your surgical area at least 5 days prior to your surgery. The surgical prep will be performed at the hospital according to Infection Control regulations.    Contact Lens must be removed before surgery. Either do not wear the contact lens or bring a case and solution for storage.  Please bring a container for eyeglasses or dentures as required.  Bring any paperwork your physician has provided, such as consent forms,  history and physicals, doctor's orders, etc.   Bring comfortable clothes that are loose fitting to wear upon discharge. Take into consideration the type of surgery being performed.  Maintain your diet as advised per your physician the day prior to surgery.      Adequate rest the night before surgery is advised.   Park in the Parking lot behind the hospital or in the [facility] [facility] across the street from the parking lot. Parking is complimentary.  If you will be discharged the same day as your procedure, please arrange for a responsible adult to drive you home or to accompany you if traveling by taxi.   YOU WILL NOT BE PERMITTED TO DRIVE OR TO LEAVE THE HOSPITAL ALONE AFTER SURGERY.   If you are being discharged the same day, it is  strongly recommended that you arrange for someone to remain with you for the first 24 hrs following your surgery.    The Surgeon will speak to your family/visitor after your surgery regarding the outcome of your surgery and post op care.  The Surgeon may speak to you after your surgery, but there is a possibility you may not remember the details.  Please check with your family members regarding the conversation with the Surgeon.    We strongly recommend whoever is bringing you home be present for discharge instructions.  This will ensure a thorough understanding for your post op home care.    ALL CHILDREN MUST ALWAYS BE ACCOMPANIED BY AN ADULT.    Visitors-Refer to current Visitor policy handouts.    Thank you for your cooperation.  The Staff of [facility].            Bathing Instructions with Hibiclens    Shower the evening before and morning of your procedure with Chlorhexidine (Hibiclens)  do not use Chlorhexidine on your face or genitals. Do not get in your eyes.  Wash your face with water and your regular face wash/soap  Use your regular shampoo  Apply Chlorhexidine (Hibiclens) directly on your skin or on a wet washcloth and wash gently. When showering: Move away from the shower stream when applying Chlorhexidine (Hibiclens) to avoid rinsing off too soon.  Rinse thoroughly with warm water  Do not dilute Chlorhexidine (Hibiclens)   Dry off as usual, do not use any deodorant, powder, body lotions, perfume, after shave or cologne.

## 2023-05-11 NOTE — ANESTHESIA PREPROCEDURE EVALUATION
05/11/2023  Justine Akins is a 32 y.o., female.      Pre-op Assessment    I have reviewed the Patient Summary Reports.     I have reviewed the Nursing Notes.    I have reviewed the Medications.     Review of Systems  Anesthesia Hx:  No problems with previous Anesthesia  Family Hx of Anesthesia complications:  Personal Hx of Anesthesia complications  Difficult Intubation   Social:  Non-Smoker    Hematology/Oncology:  Hematology Normal   Oncology Normal     EENT/Dental:EENT/Dental Normal   Cardiovascular:   Exercise tolerance: good Hypertension POTS   Pulmonary:   Asthma History PE 2015. cause unknown   Renal/:   renal calculi    Hepatic/GI:   GERD    Musculoskeletal:  Musculoskeletal Normal Jesse Danler   Neurological:   Headaches Fibromyalgia  fatigue  Chronic Pain Syndrome   Endocrine:   Hypothyroidism Lupus   Dermatological:  Skin Normal    Psych:   anxiety Panic attacks         Physical Exam  General: Well nourished, Cooperative, Oriented and Alert    Airway:  Mouth Opening: Normal  TM Distance: Normal  Neck ROM: Normal ROM    Dental:  Intact        Anesthesia Plan  Type of Anesthesia, risks & benefits discussed:    Anesthesia Type: Gen ETT  Intra-op Monitoring Plan: Standard ASA Monitors  Post Op Pain Control Plan: multimodal analgesia  Induction:  IV  Airway Plan: Video and Direct  Informed Consent: Informed consent signed with the Patient and all parties understand the risks and agree with anesthesia plan.  All questions answered.   ASA Score: 3  Day of Surgery Review of History & Physical: H&P Update referred to the surgeon/provider.  Anesthesia Plan Notes: CBC, T/S today    Ready For Surgery From Anesthesia Perspective.     .

## 2023-05-11 NOTE — PROGRESS NOTES
Subjective:      Patient ID: Justine Akins is a 32 y.o. female.    Chief Complaint:  Pre-op Exam      History of Present Illness  HPI  Pt is 32 y.o. here in preop.  Scheduled for Dx LSC, removal of right endometrioma, chromopertubation, and appendectomy (will consult gen surgery intra-op, based on findings at an outside hospital).    Has been on Lupron x 3 months.  Last u/s as follows (at outside hospital):  9-10cm.      GYN & OB History  Patient's last menstrual period was 04/14/2023.   Date of Last Pap: 12/3/2019    OB History   No obstetric history on file.       Review of Systems  Review of Systems   Constitutional:  Negative for activity change, appetite change and fatigue.   HENT: Negative.  Negative for tinnitus.    Eyes: Negative.    Respiratory:  Negative for cough and shortness of breath.    Cardiovascular:  Negative for chest pain and palpitations.   Gastrointestinal: Negative.  Negative for abdominal pain, blood in stool, constipation, diarrhea and nausea.   Endocrine: Negative.  Negative for hot flashes.   Genitourinary:  Negative for dysmenorrhea, dyspareunia, dysuria, frequency, menstrual problem, pelvic pain, vaginal discharge, urinary incontinence and postcoital bleeding.   Musculoskeletal:  Negative for back pain and joint swelling.   Integumentary:  Negative for rash, breast mass, nipple discharge and breast skin changes.   Neurological: Negative.  Negative for headaches.   Hematological: Negative.  Does not bruise/bleed easily.   Psychiatric/Behavioral:  The patient is not nervous/anxious.    Breast: negative.  Negative for mass, nipple discharge and skin changes       Objective:     Physical Exam:   Constitutional: She is oriented to person, place, and time. She appears well-developed and well-nourished. No distress.    HENT:   Head: Normocephalic and atraumatic.    Eyes: Pupils are equal, round, and reactive to light. Conjunctivae and lids are normal.     Cardiovascular:  Normal  rate.      Exam reveals no edema.        Pulmonary/Chest: Effort normal.        Abdominal: Soft. Bowel sounds are normal. She exhibits no distension. There is no abdominal tenderness. There is no rebound and no guarding.             Musculoskeletal: Normal range of motion and moves all extremeties.       Neurological: She is alert and oriented to person, place, and time. She has normal strength.    Skin: Skin is warm and dry.    Psychiatric: She has a normal mood and affect. Her speech is normal and behavior is normal. Thought content normal. Her mood appears not anxious. She does not exhibit a depressed mood. She expresses no suicidal plans and no homicidal plans.       Assessment:     1. Endometrioma of ovary    2. Chronic RLQ pain               Plan:     Justine was seen today for pre-op exam.    Diagnoses and all orders for this visit:    Endometrioma of ovary  -     Full code; Standing  -     Vital signs; Standing  -     Insert peripheral IV; Standing  -     Chlorhexidine (CHG) 2% Wipes; Standing  -     Notify Physician/Vital Signs Parameters Urine output less than 0.5mL/kg/hr (with indwelling catheter) or 30 mL/hr (without indwelling catheter) or blood glucose greater than 200 mg/dL; Standing  -     Notify physician; Standing  -     Notify Physician - Potential Need of Opioid Reversal; Standing  -     Diet NPO; Standing  -     Place sequential compression device; Standing  -     Chlorohexidine Gluconate Bath; Standing  -     Place in Outpatient; Standing    Chronic RLQ pain  -     Full code; Standing  -     Vital signs; Standing  -     Insert peripheral IV; Standing  -     Chlorhexidine (CHG) 2% Wipes; Standing  -     Notify Physician/Vital Signs Parameters Urine output less than 0.5mL/kg/hr (with indwelling catheter) or 30 mL/hr (without indwelling catheter) or blood glucose greater than 200 mg/dL; Standing  -     Notify physician; Standing  -     Notify Physician - Potential Need of Opioid Reversal;  Standing  -     Diet NPO; Standing  -     Place sequential compression device; Standing  -     Chlorohexidine Gluconate Bath; Standing  -     Place in Outpatient; Standing    Other orders  -     0.9%  NaCl infusion  -     IP VTE HIGH RISK PATIENT; Standing  -     mupirocin 2 % ointment  -     famotidine tablet 20 mg  -     Ambulate; Standing       Factual information regarding the medical condition and the need for Dx LSC, removal of endoetrioma, chromopertubation, and possible appendectomy was discussed with the patient, who verbalized understanding. The therapeutic rationale, benefits, risks and potential complications were discussed, including the possibility of pain, bleeding, infection, loss of function, disfigurement, death or other unforeseen complications. Alternatives to the procedure were discussed (including potential risks, complications and benefits of each). No guarantee was expressed or implied as to the results of the procedure. Informed consent was given, as well as a request to proceed.    Aware that there is a chance we cannot remove the endometrioma out of fear of damaging other organs.

## 2023-05-15 ENCOUNTER — ANESTHESIA (OUTPATIENT)
Dept: SURGERY | Facility: OTHER | Age: 33
End: 2023-05-15
Payer: COMMERCIAL

## 2023-05-15 ENCOUNTER — HOSPITAL ENCOUNTER (OUTPATIENT)
Facility: OTHER | Age: 33
Discharge: HOME | End: 2023-05-15
Attending: OBSTETRICS & GYNECOLOGY | Admitting: OBSTETRICS & GYNECOLOGY
Payer: COMMERCIAL

## 2023-05-15 VITALS
DIASTOLIC BLOOD PRESSURE: 90 MMHG | SYSTOLIC BLOOD PRESSURE: 118 MMHG | WEIGHT: 159 LBS | HEART RATE: 94 BPM | RESPIRATION RATE: 16 BRPM | TEMPERATURE: 98 F | BODY MASS INDEX: 30.02 KG/M2 | HEIGHT: 61 IN | OXYGEN SATURATION: 98 %

## 2023-05-15 DIAGNOSIS — R10.31 CHRONIC RLQ PAIN: ICD-10-CM

## 2023-05-15 DIAGNOSIS — G89.29 CHRONIC RLQ PAIN: ICD-10-CM

## 2023-05-15 DIAGNOSIS — Z87.42 S/P OVARIAN CYSTECTOMY: Primary | ICD-10-CM

## 2023-05-15 DIAGNOSIS — Z98.890 S/P OVARIAN CYSTECTOMY: Primary | ICD-10-CM

## 2023-05-15 DIAGNOSIS — N80.129 ENDOMETRIOMA OF OVARY: ICD-10-CM

## 2023-05-15 LAB
B-HCG UR QL: NEGATIVE
CTP QC/QA: YES

## 2023-05-15 PROCEDURE — 81025 URINE PREGNANCY TEST: CPT | Performed by: ANESTHESIOLOGY

## 2023-05-15 PROCEDURE — 63600175 PHARM REV CODE 636 W HCPCS: Performed by: NURSE ANESTHETIST, CERTIFIED REGISTERED

## 2023-05-15 PROCEDURE — 58662 PR LAP,FULGURATE/EXCISE LESIONS: ICD-10-PCS | Mod: 22,,, | Performed by: OBSTETRICS & GYNECOLOGY

## 2023-05-15 PROCEDURE — 25000003 PHARM REV CODE 250: Performed by: ANESTHESIOLOGY

## 2023-05-15 PROCEDURE — 58662 LAPAROSCOPY EXCISE LESIONS: CPT | Mod: 22,,, | Performed by: OBSTETRICS & GYNECOLOGY

## 2023-05-15 PROCEDURE — 88304 TISSUE EXAM BY PATHOLOGIST: CPT | Performed by: PATHOLOGY

## 2023-05-15 PROCEDURE — 71000039 HC RECOVERY, EACH ADD'L HOUR: Performed by: OBSTETRICS & GYNECOLOGY

## 2023-05-15 PROCEDURE — 44970 PR LAP,APPENDECTOMY: ICD-10-PCS | Mod: ,,, | Performed by: SPECIALIST

## 2023-05-15 PROCEDURE — 88341 IMHCHEM/IMCYTCHM EA ADD ANTB: CPT | Performed by: PATHOLOGY

## 2023-05-15 PROCEDURE — 63600175 PHARM REV CODE 636 W HCPCS: Performed by: ANESTHESIOLOGY

## 2023-05-15 PROCEDURE — 88304 TISSUE EXAM BY PATHOLOGIST: CPT | Mod: 26,,, | Performed by: PATHOLOGY

## 2023-05-15 PROCEDURE — D9220A PRA ANESTHESIA: ICD-10-PCS | Mod: CRNA,,, | Performed by: NURSE ANESTHETIST, CERTIFIED REGISTERED

## 2023-05-15 PROCEDURE — 88341 PR IHC OR ICC EACH ADD'L SINGLE ANTIBODY  STAINPR: ICD-10-PCS | Mod: 26,,, | Performed by: PATHOLOGY

## 2023-05-15 PROCEDURE — 37000009 HC ANESTHESIA EA ADD 15 MINS: Performed by: OBSTETRICS & GYNECOLOGY

## 2023-05-15 PROCEDURE — 58350 PR REOPEN FALLOPIAN TUBE,CHROMOTUBATION: ICD-10-PCS | Mod: 50,,, | Performed by: OBSTETRICS & GYNECOLOGY

## 2023-05-15 PROCEDURE — 36000708 HC OR TIME LEV III 1ST 15 MIN: Performed by: OBSTETRICS & GYNECOLOGY

## 2023-05-15 PROCEDURE — D9220A PRA ANESTHESIA: ICD-10-PCS | Mod: ANES,,, | Performed by: ANESTHESIOLOGY

## 2023-05-15 PROCEDURE — 88341 IMHCHEM/IMCYTCHM EA ADD ANTB: CPT | Mod: 26,,, | Performed by: PATHOLOGY

## 2023-05-15 PROCEDURE — 71000015 HC POSTOP RECOV 1ST HR: Performed by: OBSTETRICS & GYNECOLOGY

## 2023-05-15 PROCEDURE — 36000709 HC OR TIME LEV III EA ADD 15 MIN: Performed by: OBSTETRICS & GYNECOLOGY

## 2023-05-15 PROCEDURE — 27201423 OPTIME MED/SURG SUP & DEVICES STERILE SUPPLY: Performed by: OBSTETRICS & GYNECOLOGY

## 2023-05-15 PROCEDURE — 44970 LAPAROSCOPY APPENDECTOMY: CPT | Mod: ,,, | Performed by: SPECIALIST

## 2023-05-15 PROCEDURE — 25000003 PHARM REV CODE 250: Performed by: OBSTETRICS & GYNECOLOGY

## 2023-05-15 PROCEDURE — 88342 CHG IMMUNOCYTOCHEMISTRY: ICD-10-PCS | Mod: 26,,, | Performed by: PATHOLOGY

## 2023-05-15 PROCEDURE — 58350 REOPEN FALLOPIAN TUBE: CPT | Mod: 50,,, | Performed by: OBSTETRICS & GYNECOLOGY

## 2023-05-15 PROCEDURE — 88305 TISSUE EXAM BY PATHOLOGIST: ICD-10-PCS | Mod: 26,,, | Performed by: PATHOLOGY

## 2023-05-15 PROCEDURE — 88305 TISSUE EXAM BY PATHOLOGIST: CPT | Performed by: PATHOLOGY

## 2023-05-15 PROCEDURE — D9220A PRA ANESTHESIA: Mod: CRNA,,, | Performed by: NURSE ANESTHETIST, CERTIFIED REGISTERED

## 2023-05-15 PROCEDURE — 37000008 HC ANESTHESIA 1ST 15 MINUTES: Performed by: OBSTETRICS & GYNECOLOGY

## 2023-05-15 PROCEDURE — 88304 PR  SURG PATH,LEVEL III: ICD-10-PCS | Mod: 26,,, | Performed by: PATHOLOGY

## 2023-05-15 PROCEDURE — 88305 TISSUE EXAM BY PATHOLOGIST: CPT | Mod: 26,,, | Performed by: PATHOLOGY

## 2023-05-15 PROCEDURE — 88342 IMHCHEM/IMCYTCHM 1ST ANTB: CPT | Performed by: PATHOLOGY

## 2023-05-15 PROCEDURE — 71000033 HC RECOVERY, INTIAL HOUR: Performed by: OBSTETRICS & GYNECOLOGY

## 2023-05-15 PROCEDURE — D9220A PRA ANESTHESIA: Mod: ANES,,, | Performed by: ANESTHESIOLOGY

## 2023-05-15 PROCEDURE — 88342 IMHCHEM/IMCYTCHM 1ST ANTB: CPT | Mod: 26,,, | Performed by: PATHOLOGY

## 2023-05-15 PROCEDURE — 63600175 PHARM REV CODE 636 W HCPCS: Mod: JZ,JG | Performed by: OBSTETRICS & GYNECOLOGY

## 2023-05-15 PROCEDURE — 71000016 HC POSTOP RECOV ADDL HR: Performed by: OBSTETRICS & GYNECOLOGY

## 2023-05-15 PROCEDURE — 25000003 PHARM REV CODE 250: Performed by: NURSE ANESTHETIST, CERTIFIED REGISTERED

## 2023-05-15 RX ORDER — IBUPROFEN 600 MG/1
600 TABLET ORAL EVERY 6 HOURS PRN
Qty: 30 TABLET | Refills: 3 | Status: SHIPPED | OUTPATIENT
Start: 2023-05-15 | End: 2023-07-14

## 2023-05-15 RX ORDER — ONDANSETRON 4 MG/1
4 TABLET, FILM COATED ORAL EVERY 8 HOURS PRN
Qty: 30 TABLET | Refills: 1 | Status: SHIPPED | OUTPATIENT
Start: 2023-05-15 | End: 2023-07-14

## 2023-05-15 RX ORDER — PROPOFOL 10 MG/ML
VIAL (ML) INTRAVENOUS
Status: DISCONTINUED | OUTPATIENT
Start: 2023-05-15 | End: 2023-05-15

## 2023-05-15 RX ORDER — OXYCODONE AND ACETAMINOPHEN 5; 325 MG/1; MG/1
1 TABLET ORAL EVERY 6 HOURS PRN
Qty: 15 TABLET | Refills: 0 | Status: SHIPPED | OUTPATIENT
Start: 2023-05-15 | End: 2023-05-15 | Stop reason: SDUPTHER

## 2023-05-15 RX ORDER — SODIUM CHLORIDE 0.9 % (FLUSH) 0.9 %
3 SYRINGE (ML) INJECTION
Status: DISCONTINUED | OUTPATIENT
Start: 2023-05-15 | End: 2023-05-15 | Stop reason: HOSPADM

## 2023-05-15 RX ORDER — MUPIROCIN 20 MG/G
OINTMENT TOPICAL
Status: DISCONTINUED | OUTPATIENT
Start: 2023-05-15 | End: 2023-05-15 | Stop reason: HOSPADM

## 2023-05-15 RX ORDER — DEXAMETHASONE SODIUM PHOSPHATE 4 MG/ML
INJECTION, SOLUTION INTRA-ARTICULAR; INTRALESIONAL; INTRAMUSCULAR; INTRAVENOUS; SOFT TISSUE
Status: DISCONTINUED | OUTPATIENT
Start: 2023-05-15 | End: 2023-05-15

## 2023-05-15 RX ORDER — SODIUM CHLORIDE, SODIUM LACTATE, POTASSIUM CHLORIDE, CALCIUM CHLORIDE 600; 310; 30; 20 MG/100ML; MG/100ML; MG/100ML; MG/100ML
INJECTION, SOLUTION INTRAVENOUS CONTINUOUS
Status: DISCONTINUED | OUTPATIENT
Start: 2023-05-15 | End: 2023-05-15 | Stop reason: HOSPADM

## 2023-05-15 RX ORDER — PROCHLORPERAZINE EDISYLATE 5 MG/ML
5 INJECTION INTRAMUSCULAR; INTRAVENOUS EVERY 30 MIN PRN
Status: DISCONTINUED | OUTPATIENT
Start: 2023-05-15 | End: 2023-05-15 | Stop reason: HOSPADM

## 2023-05-15 RX ORDER — OXYCODONE HYDROCHLORIDE 5 MG/1
5 TABLET ORAL
Status: DISCONTINUED | OUTPATIENT
Start: 2023-05-15 | End: 2023-05-15 | Stop reason: HOSPADM

## 2023-05-15 RX ORDER — FENTANYL CITRATE 50 UG/ML
INJECTION, SOLUTION INTRAMUSCULAR; INTRAVENOUS
Status: DISCONTINUED | OUTPATIENT
Start: 2023-05-15 | End: 2023-05-15

## 2023-05-15 RX ORDER — LIDOCAINE HYDROCHLORIDE 20 MG/ML
INJECTION INTRAVENOUS
Status: DISCONTINUED | OUTPATIENT
Start: 2023-05-15 | End: 2023-05-15

## 2023-05-15 RX ORDER — ROCURONIUM BROMIDE 10 MG/ML
INJECTION, SOLUTION INTRAVENOUS
Status: DISCONTINUED | OUTPATIENT
Start: 2023-05-15 | End: 2023-05-15

## 2023-05-15 RX ORDER — MIDAZOLAM HYDROCHLORIDE 1 MG/ML
INJECTION INTRAMUSCULAR; INTRAVENOUS
Status: DISCONTINUED | OUTPATIENT
Start: 2023-05-15 | End: 2023-05-15

## 2023-05-15 RX ORDER — HYDROMORPHONE HYDROCHLORIDE 2 MG/ML
0.2 INJECTION, SOLUTION INTRAMUSCULAR; INTRAVENOUS; SUBCUTANEOUS
Status: CANCELLED | OUTPATIENT
Start: 2023-05-15

## 2023-05-15 RX ORDER — SODIUM CHLORIDE 9 MG/ML
INJECTION, SOLUTION INTRAVENOUS CONTINUOUS
Status: DISCONTINUED | OUTPATIENT
Start: 2023-05-15 | End: 2023-05-15 | Stop reason: HOSPADM

## 2023-05-15 RX ORDER — OXYCODONE AND ACETAMINOPHEN 5; 325 MG/1; MG/1
1 TABLET ORAL EVERY 6 HOURS PRN
Qty: 30 TABLET | Refills: 0 | Status: SHIPPED | OUTPATIENT
Start: 2023-05-15 | End: 2023-07-14

## 2023-05-15 RX ORDER — ACETAMINOPHEN 500 MG
1000 TABLET ORAL
Status: COMPLETED | OUTPATIENT
Start: 2023-05-15 | End: 2023-05-15

## 2023-05-15 RX ORDER — CLINDAMYCIN PHOSPHATE 900 MG/50ML
INJECTION, SOLUTION INTRAVENOUS
Status: DISCONTINUED | OUTPATIENT
Start: 2023-05-15 | End: 2023-05-15

## 2023-05-15 RX ORDER — FAMOTIDINE 20 MG/1
20 TABLET, FILM COATED ORAL
Status: COMPLETED | OUTPATIENT
Start: 2023-05-15 | End: 2023-05-15

## 2023-05-15 RX ORDER — HYDROMORPHONE HYDROCHLORIDE 2 MG/ML
0.4 INJECTION, SOLUTION INTRAMUSCULAR; INTRAVENOUS; SUBCUTANEOUS EVERY 5 MIN PRN
Status: DISCONTINUED | OUTPATIENT
Start: 2023-05-15 | End: 2023-05-15 | Stop reason: HOSPADM

## 2023-05-15 RX ORDER — DOCUSATE SODIUM 100 MG/1
100 CAPSULE, LIQUID FILLED ORAL 2 TIMES DAILY PRN
Qty: 30 CAPSULE | Refills: 1 | Status: SHIPPED | OUTPATIENT
Start: 2023-05-15 | End: 2023-07-14

## 2023-05-15 RX ORDER — MEPERIDINE HYDROCHLORIDE 25 MG/ML
12.5 INJECTION INTRAMUSCULAR; INTRAVENOUS; SUBCUTANEOUS ONCE AS NEEDED
Status: DISCONTINUED | OUTPATIENT
Start: 2023-05-15 | End: 2023-05-15 | Stop reason: HOSPADM

## 2023-05-15 RX ORDER — PHENYLEPHRINE HYDROCHLORIDE 10 MG/ML
INJECTION INTRAVENOUS
Status: DISCONTINUED | OUTPATIENT
Start: 2023-05-15 | End: 2023-05-15

## 2023-05-15 RX ORDER — METHYLENE BLUE 5 MG/ML
INJECTION INTRAVENOUS
Status: DISCONTINUED | OUTPATIENT
Start: 2023-05-15 | End: 2023-05-15 | Stop reason: HOSPADM

## 2023-05-15 RX ORDER — OXYCODONE HYDROCHLORIDE 5 MG/1
5 TABLET ORAL ONCE
Status: COMPLETED | OUTPATIENT
Start: 2023-05-15 | End: 2023-05-15

## 2023-05-15 RX ORDER — ONDANSETRON 2 MG/ML
INJECTION INTRAMUSCULAR; INTRAVENOUS
Status: DISCONTINUED | OUTPATIENT
Start: 2023-05-15 | End: 2023-05-15

## 2023-05-15 RX ADMIN — OXYCODONE HYDROCHLORIDE 5 MG: 5 TABLET ORAL at 02:05

## 2023-05-15 RX ADMIN — PROPOFOL 200 MG: 10 INJECTION, EMULSION INTRAVENOUS at 11:05

## 2023-05-15 RX ADMIN — ONDANSETRON HYDROCHLORIDE 4 MG: 2 INJECTION INTRAMUSCULAR; INTRAVENOUS at 01:05

## 2023-05-15 RX ADMIN — FAMOTIDINE 20 MG: 20 TABLET, FILM COATED ORAL at 09:05

## 2023-05-15 RX ADMIN — MIDAZOLAM HYDROCHLORIDE 2 MG: 1 INJECTION, SOLUTION INTRAMUSCULAR; INTRAVENOUS at 10:05

## 2023-05-15 RX ADMIN — SODIUM CHLORIDE, SODIUM LACTATE, POTASSIUM CHLORIDE, AND CALCIUM CHLORIDE: .6; .31; .03; .02 INJECTION, SOLUTION INTRAVENOUS at 10:05

## 2023-05-15 RX ADMIN — LIDOCAINE HYDROCHLORIDE 100 MG: 20 INJECTION, SOLUTION INTRAVENOUS at 11:05

## 2023-05-15 RX ADMIN — HYDROMORPHONE HYDROCHLORIDE 0.4 MG: 2 INJECTION INTRAMUSCULAR; INTRAVENOUS; SUBCUTANEOUS at 02:05

## 2023-05-15 RX ADMIN — CLINDAMYCIN PHOSPHATE 900 MG: 18 INJECTION, SOLUTION INTRAVENOUS at 11:05

## 2023-05-15 RX ADMIN — SUGAMMADEX 288 MG: 100 INJECTION, SOLUTION INTRAVENOUS at 01:05

## 2023-05-15 RX ADMIN — PHENYLEPHRINE HYDROCHLORIDE 100 MCG: 10 INJECTION INTRAVENOUS at 12:05

## 2023-05-15 RX ADMIN — ESMOLOL HYDROCHLORIDE 30 MG: 100 INJECTION, SOLUTION INTRAVENOUS at 11:05

## 2023-05-15 RX ADMIN — DEXAMETHASONE SODIUM PHOSPHATE 8 MG: 4 INJECTION, SOLUTION INTRAMUSCULAR; INTRAVENOUS at 11:05

## 2023-05-15 RX ADMIN — FENTANYL CITRATE 100 MCG: 50 INJECTION, SOLUTION INTRAMUSCULAR; INTRAVENOUS at 11:05

## 2023-05-15 RX ADMIN — FENTANYL CITRATE 50 MCG: 50 INJECTION, SOLUTION INTRAMUSCULAR; INTRAVENOUS at 01:05

## 2023-05-15 RX ADMIN — OXYCODONE HYDROCHLORIDE 5 MG: 5 TABLET ORAL at 04:05

## 2023-05-15 RX ADMIN — MUPIROCIN: 20 OINTMENT TOPICAL at 09:05

## 2023-05-15 RX ADMIN — ROCURONIUM BROMIDE 50 MG: 10 SOLUTION INTRAVENOUS at 11:05

## 2023-05-15 RX ADMIN — ACETAMINOPHEN 1000 MG: 500 TABLET, FILM COATED ORAL at 09:05

## 2023-05-15 RX ADMIN — ROCURONIUM BROMIDE 20 MG: 10 SOLUTION INTRAVENOUS at 12:05

## 2023-05-15 NOTE — ANESTHESIA POSTPROCEDURE EVALUATION
Anesthesia Post Evaluation    Patient: Justine Akins    Procedure(s) Performed: Procedure(s) (LRB):  LAPAROSCOPY, DIAGNOSTIC (N/A)  EXCISION, ENDOMETRIOMA (Bilateral)  APPENDECTOMY, LAPAROSCOPIC (N/A)  CHROMOTUBATION, OVIDUCT (N/A)  LYSIS, ADHESIONS, LAPAROSCOPIC (N/A)    Final Anesthesia Type: general      Patient location during evaluation: PACU  Patient participation: Yes- Able to Participate  Level of consciousness: awake and alert  Post-procedure vital signs: reviewed and stable  Pain management: adequate  Airway patency: patent    PONV status at discharge: No PONV  Anesthetic complications: no      Cardiovascular status: blood pressure returned to baseline and stable  Respiratory status: room air  Hydration status: euvolemic  Follow-up not needed.          Vitals Value Taken Time   /71 05/15/23 1501   Temp 36.4 °C (97.6 °F) 05/15/23 1343   Pulse 97 05/15/23 1503   Resp 16 05/15/23 1450   SpO2 97 % 05/15/23 1503   Vitals shown include unvalidated device data.      No case tracking events are documented in the log.      Pain/Graham Score: Pain Rating Prior to Med Admin: 9 (5/15/2023  2:50 PM)  Pain Rating Post Med Admin: 9 (5/15/2023  2:45 PM)  Graham Score: 9 (5/15/2023  2:45 PM)

## 2023-05-15 NOTE — PLAN OF CARE
Justine Akins has met all discharge criteria from Phase II. Vital Signs are stable, ambulating  without difficulty.Pain is now under control and tolerable for the pt. Pain score 4 at this time.  Discharge instructions given, patient verbalized understanding. Discharged from facility via wheelchair in stable condition.

## 2023-05-15 NOTE — ANESTHESIA PROCEDURE NOTES
Intubation    Date/Time: 5/15/2023 11:18 AM  Performed by: Dickson Castañeda CRNA  Authorized by: Cecilio Currie MD     Intubation:     Induction:  Intravenous    Intubated:  Postinduction    Mask Ventilation:  Easy mask    Attempts:  1    Attempted By:  CRNA    Method of Intubation:  Video laryngoscopy    Blade:  Quan 3    Laryngeal View Grade: Grade I - full view of cords      Difficult Airway Encountered?: No      Complications:  None    Airway Device:  Oral endotracheal tube    Airway Device Size:  7.0    Inflation Amount (mL):  4    Tube secured:  22    Placement Verified By:  Capnometry    Complicating Factors:  None    Findings Post-Intubation:  BS equal bilateral

## 2023-05-15 NOTE — INTERVAL H&P NOTE
The patient has been examined and the H&P has been reviewed:    I concur with the findings and no changes have occurred since H&P was written.    Surgery risks, benefits and alternative options discussed and understood by patient/family. All questions answered and concerns addressed. To OR for planned procedure.    Guadalupe Kunz MD/MPH  OB/GYN PGY3            There are no hospital problems to display for this patient.

## 2023-05-15 NOTE — OR NURSING
"Pt awake and sitting up talking, rates abdominal pain 8/10 but states she lives at a "6" and can "handle to pain". Pt states she is "ready to get up and get moving".    "

## 2023-05-15 NOTE — DISCHARGE INSTRUCTIONS
Abdominal Laparoscopy Discharge Instructions      Activity  Limit your activity for 4-6 weeks.  Dont lift anything heavier than 5-10 pounds.  Avoid strenuous activities, such as mowing the lawn, vacuuming, or playing sports.  Limit your activity to short, slow walks. Gradually increase your pace and distance as you feel able.  Listen to your body. If an activity causes pain, stop.  Rest when you are tired.  Dont have sexual intercourse or use tampons or douches until your doctor says its safe to do so.    Home Care  Always keep your incision clean and dry  Shower as instructed in 24 hours. You may wash your incision gently with mild soap and warm water and pat dry.  Avoid tub baths, hot tubs and swimming pools until seen by your physician for a post-op follow up.  If you have steri strips they should fall off in 7-10 days.    If you have band aids covering your incisions change daily or when soiled.  The band aids may be removed post op day 1 if they are clean and dry.  Take your medication exactly as instructed by your doctor.  Return to your diet as you feel able. Eat a healthy, well-balanced diet.  Avoid constipation.  Use laxatives, stool softeners, or enemas as directed by your doctor.  Eat more high-fiber foods.  Drink 6-8 glasses of water every day, unless directed otherwise.  Follow-Up  Make a follow-up appointment as directed by our staff.       When to Call Your Doctor  Call your doctor right away if you have any of the following:  Fever above 101.5°F  (38.6°C) or chills  Bright red vaginal bleeding or a foul-smelling discharge  Vaginal bleeding that soaks more than one sanitary pad per hour  Trouble urinating or burning sensation when you urinate  Severe abdominal pain or bloating  Redness, swelling, or drainage at your incision site  Shortness of breath        ________________________________________________________________  FOR EMERGENCIES:  If any unusual problems or difficulties occur, contact   Day or the resident at [phone] (page ) or at the Clinic office, [phone].

## 2023-05-15 NOTE — DISCHARGE SUMMARY
Discharge Summary  Gynecology      Admit Date: 5/15/2023    Discharge Date and Time: 5/15/2023  5:09 PM    Attending Physician: Israel Bernstein MD    Principal Diagnoses: S/P ovarian cystectomy    Active Hospital Problems    Diagnosis  POA    *S/P ovarian cystectomy [Z98.890, Z87.42]  Not Applicable      Resolved Hospital Problems   No resolved problems to display.       Procedures: Procedure(s) (LRB):  LAPAROSCOPY, DIAGNOSTIC (N/A)  EXCISION, ENDOMETRIOMA (Bilateral)  APPENDECTOMY, LAPAROSCOPIC (N/A)  CHROMOTUBATION, OVIDUCT (N/A)  LYSIS, ADHESIONS, LAPAROSCOPIC (N/A)    Discharged Condition: good    Hospital Course:   Justine Akins is a 32 y.o. y.o. No obstetric history on file. female who presented on 5/15/2023   for above procedures for the treatment of endometriosis. Patient tolerated procedure. Post-operative course was uncomplicated.  On day of discharge, patient was urinating, ambulating, and tolerating a regular diet without difficulty. Pain was well controlled on PO medication. She was discharged home on POD#0 in stable condition with instructions to follow up with Dr. Bernstein in 6 weeks.     Consults: None    Significant Diagnostic Studies:  Recent Labs   Lab 05/11/23  1524   WBC 5.68   HGB 14.3   HCT 44.5   MCV 91           Treatments:   1. Surgery as above    Disposition: Home or Self Care    Patient Instructions:   Current Discharge Medication List        START taking these medications    Details   docusate sodium (COLACE) 100 MG capsule Take 1 capsule (100 mg total) by mouth 2 (two) times daily as needed for Constipation.  Qty: 30 capsule, Refills: 1      ibuprofen (ADVIL,MOTRIN) 600 MG tablet Take 1 tablet (600 mg total) by mouth every 6 (six) hours as needed for Pain.  Qty: 30 tablet, Refills: 3      ondansetron (ZOFRAN) 4 MG tablet Take 1 tablet (4 mg total) by mouth every 8 (eight) hours as needed for Nausea.  Qty: 30 tablet, Refills: 1      oxyCODONE-acetaminophen (PERCOCET) 5-325 mg  per tablet Take 1 tablet by mouth every 6 (six) hours as needed for Pain.  Qty: 15 tablet, Refills: 0    Comments: Quantity prescribed more than 7 day supply? No           CONTINUE these medications which have NOT CHANGED    Details   albuterol-ipratropium (DUO-NEB) 2.5 mg-0.5 mg/3 mL nebulizer solution Take 3 mLs by nebulization every 6 (six) hours as needed for Wheezing. Rescue  Qty: 1 Box, Refills: 2    Associated Diagnoses: Upper respiratory tract infection, unspecified type; Asthma, unspecified asthma severity, unspecified whether complicated, unspecified whether persistent      butalbital-acetaminophen-caff -40 mg Cap Take 1 capsule by mouth.      cholecalciferol, vitamin D3, 5,000 unit Tab Take 5,000 Units by mouth once daily.       fish oil-omega-3 fatty acids 300-1,000 mg capsule Take by mouth once daily.      HYDROcodone-acetaminophen (NORCO) 5-325 mg per tablet Take 1 tablet by mouth every 12 (twelve) hours as needed.      hydrOXYchloroQUINE (PLAQUENIL) 200 mg tablet Take 1 tablet (200 mg total) by mouth once daily.  Qty: 30 tablet, Refills: 6      losartan (COZAAR) 25 MG tablet Take 1 tablet (25 mg total) by mouth once daily.  Qty: 90 tablet, Refills: 3    Comments: .  Associated Diagnoses: Hypertension, unspecified type      magnesium oxide (MAG-OX) 400 mg (241.3 mg magnesium) tablet Take 400 mg by mouth once daily.      ondansetron (ZOFRAN-ODT) 4 MG TbDL Take 1 tablet (4 mg total) by mouth every 6 (six) hours as needed (nausea).  Qty: 30 tablet, Refills: 1    Associated Diagnoses: Gastritis without bleeding, unspecified chronicity, unspecified gastritis type      aspirin (ECOTRIN) 81 MG EC tablet Take 81 mg by mouth once daily.      budesonide (PULMICORT) 0.5 mg/2 mL nebulizer solution Take 2 mLs (0.5 mg total) by nebulization once daily. Controller  Refills: 0    Associated Diagnoses: Asthma, unspecified asthma severity, unspecified whether complicated, unspecified whether persistent; Upper  respiratory tract infection, unspecified type      enoxaparin (LOVENOX) 40 mg/0.4 mL Syrg Inject 0.4 mLs (40 mg total) into the skin once daily.  Qty: 12 mL, Refills: 3    Associated Diagnoses: SLE (systemic lupus erythematosus related syndrome); Pulmonary thromboembolism             Discharge Procedure Orders   Diet general     Lifting restrictions   Order Comments: No lifting greater than 15 pounds for six weeks.     Other restrictions (specify):   Order Comments: PELVIC REST:  No douching, tampons, or intercourse for 6 weeks.    If prescribed, vaginal estrogen cream may be used during the postoperative period.     DRIVING:  No driving while on narcotics. Driving may be resumed initially with a competent passenger one to two weeks after surgery if no longer taking narcotics.     EXERCISE:  For six weeks your exercise should be limited to walking. You may walk as far as you wish, as long as you increase your level of exertion gradually and avoid slippery surfaces. You may climb stairs as needed to get around, but should not use stair climbing for exercise.     Remove dressing in 24 hours   Order Comments: If you have a bandage on wound, you may remove it the day after dismissal.  If you had steri-strips remove them once they begin to peel off (usually 2 weeks). Keep incision clean and dry.  Inspect the incision daily for signs and symptoms of infection.     Wound care routine (specify)   Order Comments: WOUND CARE:  If you have a band-aid or bandage on your wound, you may remove it the day after dismissal.  You may wash the wound with mild soap and water.   You may shower at any time but should avoid immersing any abdominal incisions in water for at least two weeks after surgery or until the wound is completely healed. If given, please shower with Hibiclens soap until bottle is completely finished. Keep your wound clean and dry.  You should observe your incision for signs of infection which include redness, warmth,  drainage or fever.     Call MD for:  temperature >100.4     Call MD for:  persistent nausea and vomiting     Call MD for:  severe uncontrolled pain     Call MD for:  difficulty breathing, headache or visual disturbances     Call MD for:  redness, tenderness, or signs of infection (pain, swelling, redness, odor or green/yellow discharge around incision site)     Call MD for:  hives     Call MD for:   Order Comments: inability to void,urine is ketchup colored or you have large clots, vaginal bleeding is heavier than a period.    VAGINAL DISCHARGE: You may develop a vaginal discharge and intermittent vaginal spotting after surgery and up to 6 weeks postoperatively.  The discharge may have an odor and may change in color but it is normal.  This is due to dissolving stiches.  Contact your surgical team if you develop vaginal or vulvar irritation along with a discharge.  Also contact your surgical team if you have vaginal discharge that smells like urine or stool.    CONSTIPATION REMEDIES: Patients are often constipated after surgery or with use of oral narcotic medicine. You should continue to take the stool softener, Senokot-S during the next six weeks, and consume adequate amounts of water.  If you have not had a bowel movement for 3 days after dismissal, or are uncomfortable and unable to pass stool, please try one or all of the following measures:  1.  Milk of Magnesia - 30 cc by mouth every 12 hours   2.  Dulcolax suppository - One suppository per rectum every 4-6 hours   3.  Metamucil, Fibercon or other bulk former - use as directed  4.  Fleets Enema        PAIN MEDICATIONS:     Take your pain medications as instructed. It is best to take pain medications before your pain becomes severe. This will allow you to take less medication yet have better pain relief. For the first 2 or 3 days it may be helpful to take your pain medications on a regular schedule (e.g. every 4 to 6 hours). This will help you to keep your  pain under better control. You should then begin to take fewer medications each day until you no longer need them. Do not take pain medication on an empty stomach. This may lead to nausea and vomiting.     Activity as tolerated   Order Comments: Return to normal activity slowly as you feel able.  For 6 weeks your exercise should be limited to walking.  You may walk as far as you wish, as long as you increase your level of exertion gradually and avoid slippery surfaces.    If you had a hysterectomy at the surgery do not insert anything in your vagina for 9 weeks.     Shower on day dressing removed (No bath)   Order Comments: You may shower at any time but should avoid immersing any abdominal incisions in water for at least 2 weeks after surgery or until the wound is completely healed.  If given, please shower with Hibaclens soap until bottle is completely finish        Follow-up Information       Israel Bernstein MD. Schedule an appointment as soon as possible for a visit in 6 week(s).    Specialties: Obstetrics, Obstetrics and Gynecology  Why: Postoperative visit  Contact information:  [address] [id number] [phone]                             Guadalupe Kunz MD/MPH  OB/GYN PGY3

## 2023-05-15 NOTE — TRANSFER OF CARE
"Anesthesia Transfer of Care Note    Patient: Justine Akins    Procedure(s) Performed: Procedure(s) (LRB):  LAPAROSCOPY, DIAGNOSTIC (N/A)  EXCISION, ENDOMETRIOMA (Bilateral)  APPENDECTOMY, LAPAROSCOPIC (N/A)  CHROMOTUBATION, OVIDUCT (N/A)  LYSIS, ADHESIONS, LAPAROSCOPIC (N/A)    Patient location: PACU    Anesthesia Type: MAC    Transport from OR: Transported from OR on 2-3 L/min O2 by NC with adequate spontaneous ventilation    Post pain: adequate analgesia    Post assessment: no apparent anesthetic complications    Post vital signs: stable    Level of consciousness: awake    Nausea/Vomiting: no nausea/vomiting    Complications: none    Transfer of care protocol was followed      Last vitals:   Visit Vitals  /78 (BP Location: Left arm, Patient Position: Lying)   Pulse 91   Temp 36.4 °C (97.6 °F) (Skin)   Resp 16   Ht 5' 1" (1.549 m)   Wt 72.1 kg (159 lb)   LMP 04/13/2023 (Approximate)   SpO2 100%   Breastfeeding No   BMI 30.04 kg/m²     "

## 2023-05-16 NOTE — OP NOTE
OPERATIVE REPORT FOR LAPAROSCOPIC EXCISION OF ENDOMETRIOMA OF RIGHT OVARY, CHROMOPERTUBATION, APPENDECTOMY (BY DR. SUMMERS, GENERAL SURGERY)                                                                                                                              Date of Procedure: 05/16/2023                                                                               SURGEON:  Israel Bernstein M.D.                                                                                                                    ASSISTANT:  Guadalupe Kunz MD (RES)                                                                                               PREOPERATIVE DIAGNOSIS:          1. S/P ovarian cystectomy    2. Chronic RLQ pain    3. Endometrioma of ovary                                                                                   POSTOPERATIVE DIAGNOSIS:       1. S/P ovarian cystectomy    2. Chronic RLQ pain    3. Endometrioma of ovary                                                                                   PROCEDURE:  Diagnostic laparoscopy, excision of endometrioma of right ovary, lysis of adhesions lasting for 60 min (mod 22), ureterolysis, general surgery assisted with appendectomy, chromopertubation                                                                                                                   ANESTHESIA:  GETA    BLOOD LOSS:  200  mL.                                                                                                                                      URINE OUTPUT:  400 mL.                                                                                       IV FLUIDS:  1000 cc    COMPLICATIONS:  None    SPECIMINES:  Right ovarian cyst wall    DRAINS:  None      FINDINGS:  1) normal uterine cavity  Normal upper abdomen  Absent left adnexal structures (although ? Ovarian remnant in retroperitoneal area that was difficult to fully visualize due to dense adhesions)  Fill/spill  of right fallopian tube  Large 10cm right endometrioma removed  Normal appendix (removed)  Minimal descent  Stage 4 endometriosis    STATEMENT OF MEDICAL NECESSITY:  Pt is a 32 y.o. year old female who has persistent pain unresponsive to medical treatment.  After discussing risks/benefits/alternatives/and indications, pt wished to proceed with the above stated case.                                                                               PROCEDURE IN DETAIL:  After appropriate consents had been obtained and time out performed, the patient was taken to the procedure room at which time general anesthesia was administered.  The patient was then placed in the lithotomy position, prepped in the appropriate sterile fashion.  A sterile speculum was put in place and a uterine manipulator was inserted after decompression of the bladder..  Attention was then taken to the umbilicus at which time a Veress needle was inserted.  After confirming an opening pressure of 8 mmHg, the abdomen was insufflated to a maximum pressure of 15 mmHg.  A 5mm skin incision was then made in the left upper quadrant and the trocar was inserted under direct visualization.  Thin omental adhesions were released from umbilical area and a 5mm trocar was then placed under direct visualization.  The patient was placed in [place] and a 5mm trocar was placed in right lower quadrant, and 11mm trocar was placed in left lower quadrant.  A thorough inspection of the abdomen and pelvis was performed with the above findings.  Attention was first taken to the left side at which time no clear adnexal structures were seen.  Attention was then taken to the cul-de-sac which was obliterated with dense rectal adhesions to posterior aspect of uterus.  Attention was then taken to the right side at which time a large 10cm endometrioma was found.  Attention was turned to the uterus where no significant pathology was found.  And finally, a thorough upper abdominal  survey was performed and no other scar tissue was found.  Right uteterolysis was performed to avoid injury.  The right endometrioma was ruptured and the pelvis was thoroughly irrigated.  The opening was extended using monopolar scissors and the cyst wall and ovarian tissue were .  Using careful dissection, the entire cyst wall was removed from the right ovary.  Using monopolar cautery made hemostatic after tissue removal.  Chromopertubation was then performed using dilute methylene blue dye and fill/spill of right fallopian tube was confirmed.  After confirming hemostasis, Surgicel power was  used.  General surgery then assisted with removal of the appendix (please refer to their op note).  No complications encountered.  The pneumoperitoneum was released and the trocars were removed under visualization.  The fascia was closed using 0 vicryl suture.  The skin was closed using a 4-0 monocryl suture.  The patient was then extubated and taken to the recovery area in stable condition.  The uterine manipulator was removed.       All counts were correct x 2 at the end of the procedure.  Antibiotics were indicated for this case and given prior to incision.

## 2023-05-16 NOTE — OP NOTE
[facility]  Surgery Department  Operative Note    SUMMARY     Patient: Justine Akins    Medical Record: [id number]    Date of Procedure: 5/15/2023     Surgeon: Surgeon(s) and Role:     * Israel Bernstein MD - Primary     * Guadalupe Kunz MD - Resident - Assisting     * Jerry Dias Jr., MD - Co-Surgeon        Pre-Operative Diagnosis: Endometrioma of ovary [N80.129]    Post-Operative Diagnosis: Post-Op Diagnosis Codes:     * Endometrioma of ovary [N80.129]    Procedure: Procedure(s) (LRB):  LAPAROSCOPY, DIAGNOSTIC (N/A)  EXCISION, ENDOMETRIOMA (Bilateral)  APPENDECTOMY, LAPAROSCOPIC (N/A)  CHROMOTUBATION, OVIDUCT (N/A)  LYSIS, ADHESIONS, LAPAROSCOPIC (N/A)    Procedure in Detail:  Patient is a 32-year-old female who was undergoing laparoscopic surgery by gyn for endometriosis.  The patient was recently hospitalized at [facility] with acute appendicitis which she elected to treat with antibiotics.  This morning the attending asked me to remove the appendix when it appeared to be enlarged.  A 12 port was present in the left lower quadrant and 5 mm ports in the right lower quadrant and right upper quadrant.  These were used to perform the appendectomy.  The appendix was grasped with a ratcheted retractor placed through the right lower quadrant port.  Using the harmonic scalpel the mesoappendix was taken down exposing the appendix at its junction with the cecum.  A linear endoscopic stapler was placed through the 12 mm port and the appendix was transected at its base.  The staple line inspected.  It appeared to be intact without evidence of bleeding.  The case was then returned to the gynecologist Dr. Bernstein who will dictate his portion of the procedure.

## 2023-05-17 ENCOUNTER — TELEPHONE (OUTPATIENT)
Dept: OBSTETRICS AND GYNECOLOGY | Facility: CLINIC | Age: 33
End: 2023-05-17
Payer: COMMERCIAL

## 2023-05-17 NOTE — TELEPHONE ENCOUNTER
Patient is doing well pain at 5 on right side has not had a bowel movement drinking plenty of water having gas over all she's fine post op visit scheduled.

## 2023-05-19 ENCOUNTER — PATIENT MESSAGE (OUTPATIENT)
Dept: OBSTETRICS AND GYNECOLOGY | Facility: CLINIC | Age: 33
End: 2023-05-19
Payer: COMMERCIAL

## 2023-05-20 ENCOUNTER — HOSPITAL ENCOUNTER (OUTPATIENT)
Facility: OTHER | Age: 33
Discharge: HOME | End: 2023-05-22
Attending: EMERGENCY MEDICINE | Admitting: STUDENT IN AN ORGANIZED HEALTH CARE EDUCATION/TRAINING PROGRAM
Payer: COMMERCIAL

## 2023-05-20 DIAGNOSIS — R10.31 RIGHT LOWER QUADRANT ABDOMINAL PAIN: ICD-10-CM

## 2023-05-20 DIAGNOSIS — M32.9 SLE (SYSTEMIC LUPUS ERYTHEMATOSUS RELATED SYNDROME): ICD-10-CM

## 2023-05-20 DIAGNOSIS — I26.99 PULMONARY THROMBOEMBOLISM: ICD-10-CM

## 2023-05-20 DIAGNOSIS — R50.9 FEVER, UNSPECIFIED FEVER CAUSE: ICD-10-CM

## 2023-05-20 DIAGNOSIS — G89.18 POST-OP PAIN: Primary | ICD-10-CM

## 2023-05-20 DIAGNOSIS — R07.9 CHEST PAIN: ICD-10-CM

## 2023-05-20 LAB
ABO + RH BLD: NORMAL
B-HCG UR QL: NEGATIVE
BACTERIA #/AREA URNS HPF: ABNORMAL /HPF
BASOPHILS # BLD AUTO: 0.04 K/UL (ref 0–0.2)
BASOPHILS NFR BLD: 0.3 % (ref 0–1.9)
BILIRUB UR QL STRIP: NEGATIVE
BLD GP AB SCN CELLS X3 SERPL QL: NORMAL
CLARITY UR: CLEAR
COLOR UR: YELLOW
CTP QC/QA: YES
DIFFERENTIAL METHOD: ABNORMAL
EOSINOPHIL # BLD AUTO: 0.2 K/UL (ref 0–0.5)
EOSINOPHIL NFR BLD: 1.3 % (ref 0–8)
ERYTHROCYTE [DISTWIDTH] IN BLOOD BY AUTOMATED COUNT: 12.1 % (ref 11.5–14.5)
GLUCOSE UR QL STRIP: NEGATIVE
HCT VFR BLD AUTO: 33.7 % (ref 37–48.5)
HGB BLD-MCNC: 10.8 G/DL (ref 12–16)
HGB UR QL STRIP: ABNORMAL
IMM GRANULOCYTES # BLD AUTO: 0.06 K/UL (ref 0–0.04)
IMM GRANULOCYTES NFR BLD AUTO: 0.5 % (ref 0–0.5)
KETONES UR QL STRIP: NEGATIVE
LEUKOCYTE ESTERASE UR QL STRIP: ABNORMAL
LYMPHOCYTES # BLD AUTO: 1.7 K/UL (ref 1–4.8)
LYMPHOCYTES NFR BLD: 14.1 % (ref 18–48)
MCH RBC QN AUTO: 29.1 PG (ref 27–31)
MCHC RBC AUTO-ENTMCNC: 32 G/DL (ref 32–36)
MCV RBC AUTO: 91 FL (ref 82–98)
MICROSCOPIC COMMENT: ABNORMAL
MONOCYTES # BLD AUTO: 1.3 K/UL (ref 0.3–1)
MONOCYTES NFR BLD: 10.7 % (ref 4–15)
NEUTROPHILS # BLD AUTO: 8.7 K/UL (ref 1.8–7.7)
NEUTROPHILS NFR BLD: 73.1 % (ref 38–73)
NITRITE UR QL STRIP: NEGATIVE
NRBC BLD-RTO: 0 /100 WBC
PH UR STRIP: 6 [PH] (ref 5–8)
PLATELET # BLD AUTO: 328 K/UL (ref 150–450)
PMV BLD AUTO: 10.1 FL (ref 9.2–12.9)
PROT UR QL STRIP: NEGATIVE
RBC # BLD AUTO: 3.71 M/UL (ref 4–5.4)
RBC #/AREA URNS HPF: 5 /HPF (ref 0–4)
SP GR UR STRIP: <=1.005 (ref 1–1.03)
SPECIMEN OUTDATE: NORMAL
SQUAMOUS #/AREA URNS HPF: 15 /HPF
URN SPEC COLLECT METH UR: ABNORMAL
UROBILINOGEN UR STRIP-ACNC: NEGATIVE EU/DL
WBC # BLD AUTO: 11.84 K/UL (ref 3.9–12.7)
WBC #/AREA URNS HPF: 11 /HPF (ref 0–5)
WBC CLUMPS URNS QL MICRO: ABNORMAL

## 2023-05-20 PROCEDURE — 93005 ELECTROCARDIOGRAM TRACING: CPT

## 2023-05-20 PROCEDURE — 83605 ASSAY OF LACTIC ACID: CPT | Performed by: EMERGENCY MEDICINE

## 2023-05-20 PROCEDURE — 81025 URINE PREGNANCY TEST: CPT | Performed by: EMERGENCY MEDICINE

## 2023-05-20 PROCEDURE — 99285 EMERGENCY DEPT VISIT HI MDM: CPT

## 2023-05-20 PROCEDURE — 96361 HYDRATE IV INFUSION ADD-ON: CPT

## 2023-05-20 PROCEDURE — 87186 SC STD MICRODIL/AGAR DIL: CPT | Mod: 59 | Performed by: EMERGENCY MEDICINE

## 2023-05-20 PROCEDURE — 80053 COMPREHEN METABOLIC PANEL: CPT | Performed by: EMERGENCY MEDICINE

## 2023-05-20 PROCEDURE — 93010 ELECTROCARDIOGRAM REPORT: CPT | Mod: ,,, | Performed by: INTERNAL MEDICINE

## 2023-05-20 PROCEDURE — 87040 BLOOD CULTURE FOR BACTERIA: CPT | Performed by: EMERGENCY MEDICINE

## 2023-05-20 PROCEDURE — 96375 TX/PRO/DX INJ NEW DRUG ADDON: CPT

## 2023-05-20 PROCEDURE — 87077 CULTURE AEROBIC IDENTIFY: CPT | Mod: 59 | Performed by: EMERGENCY MEDICINE

## 2023-05-20 PROCEDURE — 85025 COMPLETE CBC W/AUTO DIFF WBC: CPT | Performed by: EMERGENCY MEDICINE

## 2023-05-20 PROCEDURE — 93010 EKG 12-LEAD: ICD-10-PCS | Mod: ,,, | Performed by: INTERNAL MEDICINE

## 2023-05-20 PROCEDURE — 25000003 PHARM REV CODE 250: Performed by: EMERGENCY MEDICINE

## 2023-05-20 PROCEDURE — 83615 LACTATE (LD) (LDH) ENZYME: CPT | Performed by: EMERGENCY MEDICINE

## 2023-05-20 PROCEDURE — 87088 URINE BACTERIA CULTURE: CPT | Performed by: EMERGENCY MEDICINE

## 2023-05-20 PROCEDURE — 63600175 PHARM REV CODE 636 W HCPCS: Performed by: EMERGENCY MEDICINE

## 2023-05-20 PROCEDURE — 86900 BLOOD TYPING SEROLOGIC ABO: CPT | Performed by: EMERGENCY MEDICINE

## 2023-05-20 PROCEDURE — 81000 URINALYSIS NONAUTO W/SCOPE: CPT | Performed by: EMERGENCY MEDICINE

## 2023-05-20 PROCEDURE — 87086 URINE CULTURE/COLONY COUNT: CPT | Performed by: EMERGENCY MEDICINE

## 2023-05-20 RX ORDER — HYDROMORPHONE HYDROCHLORIDE 1 MG/ML
1 INJECTION, SOLUTION INTRAMUSCULAR; INTRAVENOUS; SUBCUTANEOUS
Status: COMPLETED | OUTPATIENT
Start: 2023-05-20 | End: 2023-05-21

## 2023-05-20 RX ADMIN — SODIUM CHLORIDE 1000 ML: 9 INJECTION, SOLUTION INTRAVENOUS at 10:05

## 2023-05-20 RX ADMIN — HYDROMORPHONE HYDROCHLORIDE 1 MG: 1 INJECTION, SOLUTION INTRAMUSCULAR; INTRAVENOUS; SUBCUTANEOUS at 10:05

## 2023-05-20 NOTE — LETTER
May 22, 2023         [address]  [facility] [address]  Phone: [phone]  Fax: [phone]       Patient: Justine Akins   YOB: 1990  Date of Visit: 05/22/2023    To Whom It May Concern:    The spouse of Adelita Akins was with her at [facility] on 05/20/2023-05/22/2023. If you have any questions or concerns, or if I can be of further assistance, please do not hesitate to contact me.    Sincerely,    Edgardo Goodson MD

## 2023-05-21 LAB
ALBUMIN SERPL BCP-MCNC: 3.1 G/DL (ref 3.5–5.2)
ALP SERPL-CCNC: 54 U/L (ref 55–135)
ALT SERPL W/O P-5'-P-CCNC: 20 U/L (ref 10–44)
ANION GAP SERPL CALC-SCNC: 7 MMOL/L (ref 8–16)
AST SERPL-CCNC: 17 U/L (ref 10–40)
BASOPHILS # BLD AUTO: 0.04 K/UL (ref 0–0.2)
BASOPHILS NFR BLD: 0.3 % (ref 0–1.9)
BILIRUB SERPL-MCNC: 0.4 MG/DL (ref 0.1–1)
BUN SERPL-MCNC: 8 MG/DL (ref 6–20)
CALCIUM SERPL-MCNC: 8.5 MG/DL (ref 8.7–10.5)
CHLORIDE SERPL-SCNC: 107 MMOL/L (ref 95–110)
CO2 SERPL-SCNC: 23 MMOL/L (ref 23–29)
CREAT SERPL-MCNC: 0.7 MG/DL (ref 0.5–1.4)
DIFFERENTIAL METHOD: ABNORMAL
EOSINOPHIL # BLD AUTO: 0.2 K/UL (ref 0–0.5)
EOSINOPHIL NFR BLD: 1.4 % (ref 0–8)
ERYTHROCYTE [DISTWIDTH] IN BLOOD BY AUTOMATED COUNT: 12.2 % (ref 11.5–14.5)
EST. GFR  (NO RACE VARIABLE): >60 ML/MIN/1.73 M^2
GLUCOSE SERPL-MCNC: 103 MG/DL (ref 70–110)
HCT VFR BLD AUTO: 30 % (ref 37–48.5)
HGB BLD-MCNC: 9.6 G/DL (ref 12–16)
IMM GRANULOCYTES # BLD AUTO: 0.09 K/UL (ref 0–0.04)
IMM GRANULOCYTES NFR BLD AUTO: 0.7 % (ref 0–0.5)
LACTATE SERPL-SCNC: 0.7 MMOL/L (ref 0.5–2.2)
LDH SERPL L TO P-CCNC: 159 U/L (ref 110–260)
LYMPHOCYTES # BLD AUTO: 1.6 K/UL (ref 1–4.8)
LYMPHOCYTES NFR BLD: 13.4 % (ref 18–48)
MCH RBC QN AUTO: 29.3 PG (ref 27–31)
MCHC RBC AUTO-ENTMCNC: 32 G/DL (ref 32–36)
MCV RBC AUTO: 92 FL (ref 82–98)
MONOCYTES # BLD AUTO: 1.4 K/UL (ref 0.3–1)
MONOCYTES NFR BLD: 11.2 % (ref 4–15)
NEUTROPHILS # BLD AUTO: 8.9 K/UL (ref 1.8–7.7)
NEUTROPHILS NFR BLD: 73 % (ref 38–73)
NRBC BLD-RTO: 0 /100 WBC
PLATELET # BLD AUTO: 313 K/UL (ref 150–450)
PMV BLD AUTO: 9.7 FL (ref 9.2–12.9)
POTASSIUM SERPL-SCNC: 4.7 MMOL/L (ref 3.5–5.1)
PROT SERPL-MCNC: 6.2 G/DL (ref 6–8.4)
RBC # BLD AUTO: 3.28 M/UL (ref 4–5.4)
SODIUM SERPL-SCNC: 137 MMOL/L (ref 136–145)
WBC # BLD AUTO: 12.2 K/UL (ref 3.9–12.7)

## 2023-05-21 PROCEDURE — 25000242 PHARM REV CODE 250 ALT 637 W/ HCPCS: Performed by: STUDENT IN AN ORGANIZED HEALTH CARE EDUCATION/TRAINING PROGRAM

## 2023-05-21 PROCEDURE — 96376 TX/PRO/DX INJ SAME DRUG ADON: CPT

## 2023-05-21 PROCEDURE — G0378 HOSPITAL OBSERVATION PER HR: HCPCS

## 2023-05-21 PROCEDURE — 94761 N-INVAS EAR/PLS OXIMETRY MLT: CPT

## 2023-05-21 PROCEDURE — 25000003 PHARM REV CODE 250: Performed by: STUDENT IN AN ORGANIZED HEALTH CARE EDUCATION/TRAINING PROGRAM

## 2023-05-21 PROCEDURE — 25000003 PHARM REV CODE 250: Performed by: EMERGENCY MEDICINE

## 2023-05-21 PROCEDURE — 99222 PR INITIAL HOSPITAL CARE,LEVL II: ICD-10-PCS | Mod: ,,, | Performed by: STUDENT IN AN ORGANIZED HEALTH CARE EDUCATION/TRAINING PROGRAM

## 2023-05-21 PROCEDURE — 85025 COMPLETE CBC W/AUTO DIFF WBC: CPT | Performed by: STUDENT IN AN ORGANIZED HEALTH CARE EDUCATION/TRAINING PROGRAM

## 2023-05-21 PROCEDURE — 63600175 PHARM REV CODE 636 W HCPCS: Performed by: STUDENT IN AN ORGANIZED HEALTH CARE EDUCATION/TRAINING PROGRAM

## 2023-05-21 PROCEDURE — 94640 AIRWAY INHALATION TREATMENT: CPT

## 2023-05-21 PROCEDURE — 96375 TX/PRO/DX INJ NEW DRUG ADDON: CPT

## 2023-05-21 PROCEDURE — 96365 THER/PROPH/DIAG IV INF INIT: CPT | Mod: 59

## 2023-05-21 PROCEDURE — 96366 THER/PROPH/DIAG IV INF ADDON: CPT

## 2023-05-21 PROCEDURE — 36415 COLL VENOUS BLD VENIPUNCTURE: CPT | Performed by: STUDENT IN AN ORGANIZED HEALTH CARE EDUCATION/TRAINING PROGRAM

## 2023-05-21 PROCEDURE — 99222 1ST HOSP IP/OBS MODERATE 55: CPT | Mod: ,,, | Performed by: STUDENT IN AN ORGANIZED HEALTH CARE EDUCATION/TRAINING PROGRAM

## 2023-05-21 PROCEDURE — 25500020 PHARM REV CODE 255: Performed by: EMERGENCY MEDICINE

## 2023-05-21 PROCEDURE — 63600175 PHARM REV CODE 636 W HCPCS: Performed by: EMERGENCY MEDICINE

## 2023-05-21 RX ORDER — SODIUM CHLORIDE 0.9 % (FLUSH) 0.9 %
10 SYRINGE (ML) INJECTION
Status: DISCONTINUED | OUTPATIENT
Start: 2023-05-21 | End: 2023-05-22 | Stop reason: HOSPADM

## 2023-05-21 RX ORDER — IBUPROFEN 600 MG/1
600 TABLET ORAL EVERY 6 HOURS PRN
Status: DISCONTINUED | OUTPATIENT
Start: 2023-05-21 | End: 2023-05-21

## 2023-05-21 RX ORDER — SODIUM CHLORIDE 9 MG/ML
INJECTION, SOLUTION INTRAVENOUS CONTINUOUS
Status: DISCONTINUED | OUTPATIENT
Start: 2023-05-21 | End: 2023-05-22 | Stop reason: HOSPADM

## 2023-05-21 RX ORDER — KETOROLAC TROMETHAMINE 30 MG/ML
15 INJECTION, SOLUTION INTRAMUSCULAR; INTRAVENOUS EVERY 6 HOURS
Status: DISCONTINUED | OUTPATIENT
Start: 2023-05-21 | End: 2023-05-21

## 2023-05-21 RX ORDER — BUDESONIDE 0.5 MG/2ML
0.5 INHALANT ORAL DAILY
Status: DISCONTINUED | OUTPATIENT
Start: 2023-05-21 | End: 2023-05-22 | Stop reason: HOSPADM

## 2023-05-21 RX ORDER — HYDROMORPHONE HYDROCHLORIDE 1 MG/ML
1 INJECTION, SOLUTION INTRAMUSCULAR; INTRAVENOUS; SUBCUTANEOUS
Status: DISCONTINUED | OUTPATIENT
Start: 2023-05-21 | End: 2023-05-22 | Stop reason: HOSPADM

## 2023-05-21 RX ORDER — DOCUSATE SODIUM 100 MG/1
100 CAPSULE, LIQUID FILLED ORAL 2 TIMES DAILY PRN
Status: DISCONTINUED | OUTPATIENT
Start: 2023-05-21 | End: 2023-05-21

## 2023-05-21 RX ORDER — PROCHLORPERAZINE EDISYLATE 5 MG/ML
5 INJECTION INTRAMUSCULAR; INTRAVENOUS EVERY 6 HOURS PRN
Status: DISCONTINUED | OUTPATIENT
Start: 2023-05-21 | End: 2023-05-22 | Stop reason: HOSPADM

## 2023-05-21 RX ORDER — KETOROLAC TROMETHAMINE 30 MG/ML
30 INJECTION, SOLUTION INTRAMUSCULAR; INTRAVENOUS EVERY 6 HOURS
Status: COMPLETED | OUTPATIENT
Start: 2023-05-21 | End: 2023-05-22

## 2023-05-21 RX ORDER — LOSARTAN POTASSIUM 25 MG/1
25 TABLET ORAL DAILY
Status: DISCONTINUED | OUTPATIENT
Start: 2023-05-21 | End: 2023-05-22 | Stop reason: HOSPADM

## 2023-05-21 RX ORDER — ONDANSETRON 8 MG/1
8 TABLET, ORALLY DISINTEGRATING ORAL EVERY 8 HOURS PRN
Status: DISCONTINUED | OUTPATIENT
Start: 2023-05-21 | End: 2023-05-22 | Stop reason: HOSPADM

## 2023-05-21 RX ORDER — DOCUSATE SODIUM 100 MG/1
200 CAPSULE, LIQUID FILLED ORAL 2 TIMES DAILY
Status: DISCONTINUED | OUTPATIENT
Start: 2023-05-21 | End: 2023-05-22 | Stop reason: HOSPADM

## 2023-05-21 RX ORDER — OXYCODONE HYDROCHLORIDE 5 MG/1
5 TABLET ORAL EVERY 4 HOURS PRN
Status: DISCONTINUED | OUTPATIENT
Start: 2023-05-21 | End: 2023-05-22 | Stop reason: HOSPADM

## 2023-05-21 RX ORDER — OXYCODONE HYDROCHLORIDE 5 MG/1
10 TABLET ORAL EVERY 4 HOURS PRN
Status: DISCONTINUED | OUTPATIENT
Start: 2023-05-21 | End: 2023-05-22 | Stop reason: HOSPADM

## 2023-05-21 RX ORDER — IPRATROPIUM BROMIDE AND ALBUTEROL SULFATE 2.5; .5 MG/3ML; MG/3ML
3 SOLUTION RESPIRATORY (INHALATION) EVERY 6 HOURS PRN
Status: DISCONTINUED | OUTPATIENT
Start: 2023-05-21 | End: 2023-05-22 | Stop reason: HOSPADM

## 2023-05-21 RX ORDER — SIMETHICONE 80 MG
1 TABLET,CHEWABLE ORAL 3 TIMES DAILY
Status: DISCONTINUED | OUTPATIENT
Start: 2023-05-21 | End: 2023-05-22 | Stop reason: HOSPADM

## 2023-05-21 RX ORDER — HYDROXYCHLOROQUINE SULFATE 200 MG/1
200 TABLET, FILM COATED ORAL DAILY
Status: DISCONTINUED | OUTPATIENT
Start: 2023-05-21 | End: 2023-05-22 | Stop reason: HOSPADM

## 2023-05-21 RX ADMIN — KETOROLAC TROMETHAMINE 15 MG: 30 INJECTION, SOLUTION INTRAMUSCULAR; INTRAVENOUS at 11:05

## 2023-05-21 RX ADMIN — DOCUSATE SODIUM 200 MG: 100 CAPSULE, LIQUID FILLED ORAL at 08:05

## 2023-05-21 RX ADMIN — PIPERACILLIN AND TAZOBACTAM 4.5 G: 4; .5 INJECTION, POWDER, LYOPHILIZED, FOR SOLUTION INTRAVENOUS; PARENTERAL at 02:05

## 2023-05-21 RX ADMIN — OXYCODONE HYDROCHLORIDE 10 MG: 5 TABLET ORAL at 06:05

## 2023-05-21 RX ADMIN — KETOROLAC TROMETHAMINE 30 MG: 30 INJECTION, SOLUTION INTRAMUSCULAR; INTRAVENOUS at 06:05

## 2023-05-21 RX ADMIN — OXYCODONE HYDROCHLORIDE 10 MG: 5 TABLET ORAL at 09:05

## 2023-05-21 RX ADMIN — PIPERACILLIN AND TAZOBACTAM 4.5 G: 4; .5 INJECTION, POWDER, LYOPHILIZED, FOR SOLUTION INTRAVENOUS; PARENTERAL at 09:05

## 2023-05-21 RX ADMIN — ONDANSETRON 8 MG: 8 TABLET, ORALLY DISINTEGRATING ORAL at 11:05

## 2023-05-21 RX ADMIN — HYDROMORPHONE HYDROCHLORIDE 1 MG: 1 INJECTION, SOLUTION INTRAMUSCULAR; INTRAVENOUS; SUBCUTANEOUS at 02:05

## 2023-05-21 RX ADMIN — IOHEXOL 75 ML: 350 INJECTION, SOLUTION INTRAVENOUS at 12:05

## 2023-05-21 RX ADMIN — SIMETHICONE 80 MG: 80 TABLET, CHEWABLE ORAL at 02:05

## 2023-05-21 RX ADMIN — HYDROMORPHONE HYDROCHLORIDE 1 MG: 1 INJECTION, SOLUTION INTRAMUSCULAR; INTRAVENOUS; SUBCUTANEOUS at 08:05

## 2023-05-21 RX ADMIN — BUDESONIDE 0.5 MG: 0.5 INHALANT RESPIRATORY (INHALATION) at 07:05

## 2023-05-21 RX ADMIN — HYDROMORPHONE HYDROCHLORIDE 1 MG: 1 INJECTION, SOLUTION INTRAMUSCULAR; INTRAVENOUS; SUBCUTANEOUS at 12:05

## 2023-05-21 RX ADMIN — PIPERACILLIN AND TAZOBACTAM 4.5 G: 4; .5 INJECTION, POWDER, LYOPHILIZED, FOR SOLUTION INTRAVENOUS; PARENTERAL at 01:05

## 2023-05-21 RX ADMIN — OXYCODONE HYDROCHLORIDE 10 MG: 5 TABLET ORAL at 02:05

## 2023-05-21 RX ADMIN — OXYCODONE HYDROCHLORIDE 10 MG: 5 TABLET ORAL at 10:05

## 2023-05-21 RX ADMIN — KETOROLAC TROMETHAMINE 30 MG: 30 INJECTION, SOLUTION INTRAMUSCULAR; INTRAVENOUS at 11:05

## 2023-05-21 RX ADMIN — PIPERACILLIN AND TAZOBACTAM 4.5 G: 4; .5 INJECTION, POWDER, LYOPHILIZED, FOR SOLUTION INTRAVENOUS; PARENTERAL at 06:05

## 2023-05-21 RX ADMIN — SIMETHICONE 80 MG: 80 TABLET, CHEWABLE ORAL at 08:05

## 2023-05-21 RX ADMIN — LOSARTAN POTASSIUM 25 MG: 25 TABLET, FILM COATED ORAL at 08:05

## 2023-05-21 NOTE — H&P
[facility] Emergency Dept  Obstetrics & Gynecology  History & Physical    Patient Name: Justine Akins  MRN: [id number]  Admission Date: 5/20/2023  Primary Care Provider: Reji Faith MD    Subjective:     Chief Complaint/Reason for Admission: RLQ pain    History of Present Illness:   Justine Akins is a 32 y.o. F who presents on POD#6 from lap cystectomy (endometrioma)/appendectomy/lysis of adhesions for evaluation of worsening RLQ pain. She reports that her post op course was uncomplicated until this past Wednesday when she began having worsening RLQ pain. She reports subjective fevers at home in the 100s but denies any nausea or vomiting, chest pain or SOB, vaginal discharge or irritation. She also denies urinary or bowel complaints. She is tolerating PO intake normally and is passing flatus and having bowel movements.     She also reports a history of PE in the past for which she was on lovenox until 2-3 days pre-op and has not resumed the medication. She reports these symptoms are non-concerning for PE.     She also reports a bruise at her RLQ incision that has not expanded over the past few days but was concerning for her.     No current facility-administered medications on file prior to encounter.     Current Outpatient Medications on File Prior to Encounter   Medication Sig    albuterol-ipratropium (DUO-NEB) 2.5 mg-0.5 mg/3 mL nebulizer solution Take 3 mLs by nebulization every 6 (six) hours as needed for Wheezing. Rescue    butalbital-acetaminophen-caff -40 mg Cap Take 1 capsule by mouth.    docusate sodium (COLACE) 100 MG capsule Take 1 capsule (100 mg total) by mouth 2 (two) times daily as needed for Constipation.    enoxaparin (LOVENOX) 40 mg/0.4 mL Syrg Inject 0.4 mLs (40 mg total) into the skin once daily.    fish oil-omega-3 fatty acids 300-1,000 mg capsule Take by mouth once daily.    HYDROcodone-acetaminophen (NORCO) 5-325 mg per tablet Take 1 tablet by mouth every 12  (twelve) hours as needed.    hydrOXYchloroQUINE (PLAQUENIL) 200 mg tablet Take 1 tablet (200 mg total) by mouth once daily.    ibuprofen (ADVIL,MOTRIN) 600 MG tablet Take 1 tablet (600 mg total) by mouth every 6 (six) hours as needed for Pain.    losartan (COZAAR) 25 MG tablet Take 1 tablet (25 mg total) by mouth once daily.    magnesium oxide (MAG-OX) 400 mg (241.3 mg magnesium) tablet Take 400 mg by mouth once daily.    ondansetron (ZOFRAN) 4 MG tablet Take 1 tablet (4 mg total) by mouth every 8 (eight) hours as needed for Nausea.    ondansetron (ZOFRAN-ODT) 4 MG TbDL Take 1 tablet (4 mg total) by mouth every 6 (six) hours as needed (nausea).    oxyCODONE-acetaminophen (PERCOCET) 5-325 mg per tablet Take 1 tablet by mouth every 6 (six) hours as needed for Pain.    aspirin (ECOTRIN) 81 MG EC tablet Take 81 mg by mouth once daily.    budesonide (PULMICORT) 0.5 mg/2 mL nebulizer solution Take 2 mLs (0.5 mg total) by nebulization once daily. Controller    cholecalciferol, vitamin D3, 5,000 unit Tab Take 5,000 Units by mouth once daily.        Review of patient's allergies indicates:   Allergen Reactions    Adhesive Blisters     Skin peels and blisters    Gabapentin Anaphylaxis    Sulfa (sulfonamide antibiotics) Anaphylaxis    Diphenhydramine hcl      Mood changes    Levofloxacin Other (See Comments)     Muscle pain    Ceftriaxone Rash    Morphine Palpitations     Other reaction(s): Other (See Comments)  Elevated BP,  can take dilaudid and oxycodone       Past Medical History:   Diagnosis Date    Anesthesia complication     pt states she needs a pediatric ET tube used due to past airway complication (trauma from tube)    Anticoagulant long-term use     Arthritis     rhuematoid arthritis, osteoarthritis    Asthma     Chronic nausea     Difficult intubation     Dysautonomia     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     Past Surgical History:   Procedure Laterality Date    ANKLE SURGERY Right 2011    reconstruction after avulsion and fracture    CHROMOTUBATION OF FALLOPIAN TUBE N/A 5/15/2023    Procedure: CHROMOTUBATION, OVIDUCT;  Surgeon: Israel Bernstein MD;  Location: [facility] OR;  Service: OB/GYN;  Laterality: N/A;    DIAGNOSTIC LAPAROSCOPY Right 07/31/2018    Procedure: LAPAROSCOPY, DIAGNOSTIC;  Surgeon: Vane Baez MD;  Location: [facility] OR;  Service: OB/GYN;  Laterality: Right;    DIAGNOSTIC LAPAROSCOPY N/A 5/15/2023    Procedure: LAPAROSCOPY, DIAGNOSTIC;  Surgeon: Israel Bernstein MD;  Location: [facility] OR;  Service: OB/GYN;  Laterality: N/A;    FRACTURE SURGERY      r ankle    JOINT REPLACEMENT      knee    KNEE ARTHROSCOPY Right 2006/2011/2013    KNEE ARTHROSCOPY W/ MENISCAL REPAIR Right     Replacement of meniscus and bone grafts    LAPAROSCOPIC APPENDECTOMY N/A 5/15/2023    Procedure: APPENDECTOMY, LAPAROSCOPIC;  Surgeon: Israel Bernstein MD;  Location: [facility];  Service: OB/GYN;  Laterality: N/A;  performed by Dr. Dias    LAPAROSCOPIC LYSIS OF ADHESIONS N/A 5/15/2023    Procedure: LYSIS, ADHESIONS, LAPAROSCOPIC;  Surgeon: Israel Bernstein MD;  Location: [facility] OR;  Service: OB/GYN;  Laterality: N/A;    LAPAROSCOPIC SALPINGO-OOPHORECTOMY Right 07/31/2018    Procedure: SALPINGO-OOPHORECTOMY, LAPAROSCOPIC;  Surgeon: Vane Baez MD;  Location: [facility] OR;  Service: OB/GYN;  Laterality: Right;    SURGICAL REMOVAL OF ENDOMETRIOMA Bilateral 5/15/2023    Procedure: EXCISION, ENDOMETRIOMA;  Surgeon: Israel Bernstein MD;  Location: [facility] OR;  Service: OB/GYN;  Laterality: Bilateral;    TONSILLECTOMY  1994     Family History       Problem  Relation (Age of Onset)    Diabetes Mother, Father    Fibromyalgia Mother    Heart attack Father    Heart disease Father    Hyperlipidemia Father    Ovarian cancer Mother          Tobacco Use    Smoking status: Never    Smokeless tobacco: Never   Substance and Sexual Activity    Alcohol use: No    Drug use: No    Sexual activity: Not Currently     Review of Systems   Constitutional:  Positive for fever. Negative for chills.   HENT:  Negative for nasal congestion.    Eyes:  Negative for visual disturbance.   Respiratory:  Negative for shortness of breath.    Cardiovascular:  Negative for chest pain and leg swelling.   Gastrointestinal:  Positive for abdominal pain. Negative for constipation, diarrhea, nausea and vomiting.   Genitourinary:  Positive for urgency. Negative for dysuria, vaginal bleeding and vaginal discharge.   Musculoskeletal:  Negative for back pain.   Neurological:  Negative for headaches.   Psychiatric/Behavioral:  The patient is not nervous/anxious.    Objective:     Vital Signs (Most Recent):  Temp: 98.6 °F (37 °C) (05/21/23 0327)  Pulse: 109 (05/21/23 0327)  Resp: 16 (05/21/23 0327)  BP: 128/68 (05/21/23 0327)  SpO2: 96 % (05/21/23 0327) Vital Signs (24h Range):  Temp:  [98 °F (36.7 °C)-98.9 °F (37.2 °C)] 98.6 °F (37 °C)  Pulse:  [] 109  Resp:  [15-20] 16  SpO2:  [96 %-100 %] 96 %  BP: (117-139)/(68-86) 128/68     Weight: 76 kg (167 lb 8.8 oz)  Body mass index is 31.66 kg/m².  Patient's last menstrual period was 04/14/2023.    Physical Exam:   Constitutional: She appears well-developed.    HENT:   Head: Normocephalic.   Nose: No epistaxis.    Eyes: EOM are normal.     Cardiovascular:  Normal rate.             Pulmonary/Chest: Effort normal. No stridor. No respiratory distress.        Abdominal: Soft. She exhibits abdominal incision (bandages and steri strips overlaid over umbilical, RLQ, LLQ pain). There is abdominal tenderness (in RLQ). There is no rebound and no guarding.                  Neurological: She is alert.    Skin: Skin is warm and dry.    Psychiatric: Her behavior is normal.     Laboratory:  CBC:   Recent Labs   Lab 05/21/23  0435   WBC 12.20   RBC 3.28*   HGB 9.6*   HCT 30.0*      MCV 92   MCH 29.3   MCHC 32.0     CMP:   Recent Labs   Lab 05/20/23  2342      CALCIUM 8.5*   ALBUMIN 3.1*   PROT 6.2      K 4.7   CO2 23      BUN 8   CREATININE 0.7   ALKPHOS 54*   ALT 20   AST 17   BILITOT 0.4     Recent Labs   Lab 05/20/23  2155   COLORU Yellow   SPECGRAV <=1.005*   PHUR 6.0   PROTEINUA Negative   BACTERIA Occasional   NITRITE Negative   LEUKOCYTESUR 1+*   UROBILINOGEN Negative       Diagnostic Results:  CT Abdomen Pelvis With Contrast    Narrative    EXAMINATION:  CT ABDOMEN PELVIS WITH CONTRAST    CLINICAL HISTORY:  Abdominal pain, post-op;    TECHNIQUE:  Low dose axial images, sagittal and coronal reformations were obtained from the lung bases to the pubic symphysis following the IV administration of 75 mL of Omnipaque 350 .  Oral contrast was not given.    COMPARISON:  CT abdomen and pelvis from July 2019.    FINDINGS:  The visualized portion of the heart is unremarkable.  The lung bases are clear.    No significant hepatic abnormalities are identified.  There is no intra-or extrahepatic biliary ductal dilatation.  The gallbladder is unremarkable.  The stomach, pancreas, spleen, and adrenal glands are unremarkable.  Small accessory splenules are seen.    Kidneys enhance normally.  Ureters are difficult to track distally.  Urinary bladder is nondistended with mild wall thickening and surrounding inflammatory changes, noting findings discussed below.  Uterus is unremarkable.    There is an air and fluid collection seen in the right lower quadrant/adnexal region measuring at least 6 x 4 cm in maximum transverse dimensions.  Small amount of surrounding inflammatory change and free fluid are seen.  There is no organized rim enhancement identified at this time.  Few  small scattered foci of free air are seen.  Postsurgical change is seen at the base of the cecum which may reflect prior appendectomy.  Appendix is not visualized.  Moderate volume retained stool is seen throughout the colon.  No evidence of bowel obstruction.    Aorta tapers normally.    No acute osseous abnormality identified.    Subcutaneous soft tissue thickening and stranding are seen involving the anterior abdominal wall, more pronounced on the right.  This is presumably related to recent postoperative change.  No focal fluid collection or rim enhancing abscess seen.      Impression    1. Right lower quadrant/adnexal 6 x 4 cm air and fluid collection with associated surrounding inflammatory changes and small volume free fluid.  Findings likely represent developing abscess.  This does not appear completely organized with no rim enhancement seen at this time.  2. Few small scattered foci of intraperitoneal free air.  This is presumably related to recent postoperative status, however potential perforation is unfortunately not excluded.  3. Mild wall thickening and inflammatory changes seen surrounding the urinary bladder.  This may at least in part relate to nondistention and aforementioned findings, however acute cystitis or reactive cystitis is a possibility.  Correlate with clinical symptoms and urinalysis.      Electronically signed by: Jossue Hendricks MD  Date:    05/21/2023  Time:    01:05        Assessment/Plan:     RLQ Pain  - Patient reports post-op pain initially improved before deteriorating a few days ago. Subjective fevers. No other significant symptoms  - VSS, afebrile in ED and no signs of sepsis  - WCC 12, LA 0.7  - CT A/P with RLQ/adnexal 6 x 4cm air and fluid collection suspicious for developing abscess. However, op note says that Surgicel powder was used during the case, likely in RLQ as right adnexa was site of endometrioma resection. Discussed case with reading radiologist who reports that  they are unable to distinguish the two on imaging  - Admit patient to observation for serial abdominal exams and zosyn for coverage of possible abscess.   - Blood and urine cultures obtained, currently pending  - Plan to follow up with primary surgeon for further information regarding use of surgicel  - Plan of care discussed with patient who consents to treatment. All questions answered.     Elise Lam MD  Obstetrics & Gynecology  [facility] - Emergency Dept

## 2023-05-21 NOTE — PLAN OF CARE
MSW met with the patient at the bedside.     Patient is alert and oriented with no communication barriers.     Prior to admission, the patient is independent. Patient denies the use of HH or DME.     Patients PCP is correct on the face sheet. Patient choice pharmacy is [facility].    The patient denies having written advance directives.      Patients' family will transport the patient home at discharge.     No CM needs to be identified at this time.     CM team will continue to follow.      05/21/23 1112   Discharge Assessment   Assessment Type Discharge Planning Assessment   Confirmed/corrected address, phone number and insurance Yes   Confirmed Demographics Correct on Facesheet   Source of Information patient;health record   People in Home significant other;child(max), dependent   Do you expect to return to your current living situation? Yes   Do you have help at home or someone to help you manage your care at home? Yes   Who are your caregiver(s) and their phone number(s)?    Prior to hospitilization cognitive status: Alert/Oriented   Current cognitive status: Alert/Oriented   Equipment Currently Used at Home none   Readmission within 30 days? No   Do you currently have service(s) that help you manage your care at home? No   Do you take prescription medications? Yes   Do you have prescription coverage? Yes   Do you have any problems affording any of your prescribed medications? No   Is the patient taking medications as prescribed? yes   How do you get to doctors appointments? car, drives self;family or friend will provide   Are you on dialysis? No   Do you take coumadin? No   Discharge Plan A Home   Discharge Plan B Home with family   DME Needed Upon Discharge  none   Discharge Plan discussed with: Patient   Transition of Care Barriers None

## 2023-05-21 NOTE — PLAN OF CARE
An admit of  the shift, AOX4, stable on RA, complaining of abdominal pain with visible incision sites and abdominal skin bruises, pain was managed with Oxycodone as per MAR. She tolerated antibiotics, no nausea and vomiting. Safety measures maintained, remains on NPO, no falls or injury recorded, bed is low and locked, patient ia calm and asleep at this time. Will continue to observe her.  Problem: Adult Inpatient Plan of Care  Goal: Plan of Care Review  Outcome: Ongoing, Progressing  Goal: Patient-Specific Goal (Individualized)  Outcome: Ongoing, Progressing  Goal: Absence of Hospital-Acquired Illness or Injury  Outcome: Ongoing, Progressing  Goal: Optimal Comfort and Wellbeing  Outcome: Ongoing, Progressing  Goal: Readiness for Transition of Care  Outcome: Ongoing, Progressing     Problem: Fall Injury Risk  Goal: Absence of Fall and Fall-Related Injury  Outcome: Ongoing, Progressing     Problem: Pain Acute  Goal: Acceptable Pain Control and Functional Ability  Outcome: Ongoing, Progressing     Problem: Infection  Goal: Absence of Infection Signs and Symptoms  Outcome: Ongoing, Progressing

## 2023-05-21 NOTE — CARE UPDATE
To patient bedside for evaluation of pain.    Patient endorsing worsening abdominal pain. At home she was taking ibuprofen with more relief than she has had here with just the oxycodone 10 mg. Before her surgery she was taking oxycodone 2.5 mg in the morning and at night for her chronic pain.    Stating abdominal pain has a baseline pain and then comes and goes. Worse on right, feels like someone is using her ovary as a stress bal and that it is also a tearing pain.    Temp:  [98 °F (36.7 °C)-100 °F (37.8 °C)] 100 °F (37.8 °C)  Pulse:  [] 112  Resp:  [15-20] 16  SpO2:  [96 %-100 %] 97 %  BP: (117-139)/(68-86) 118/72      PE:  Sitting in bed, not diaphoretic, occasional wincing  Abdomen: soft, distended, moderately tender, voluntary guarding, - rebound. Large bruise around right port site, marker around it (pt  placed a few days ago).     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  -Abdominal bruising - discussed with patient this appears to be a bruise and not cellulitis   -Pt expresses understanding    Fernando Biggs,  MD [facility] PGY-4

## 2023-05-21 NOTE — ED PROVIDER NOTES
Encounter Date: 5/20/2023       History     Chief Complaint   Patient presents with    Post-op Problem     Recent surgical procedure last Monday. Reports pain, fever, CP that started yesterday. Surgical site warm to touch.     33 yo female with a complicated past medical history including connective tissue disease, autoimmune disease, endometriosis that presents for evaluation of fevers and worsening abdominal pain post a laparoscopic endometriomectomy and appendectomy 5 days prior. She had done well post operatively for 2 days but then developed worsening abdominal pain, fevers and bruising around her right lower abdominal incision site.   She is been taking medications to try and assist with this but they have offered little relief.  She did have a normal bowel movement yesterday but has had some constipation over this time.    She never anything like this in the past that she can recall, nothing in particular seems to make the symptoms much better, they seem to be worsening with time.    She notes that she is had a poor appetite need primarily a liquid and brat diet.        Review of patient's allergies indicates:   Allergen Reactions    Adhesive Blisters     Skin peels and blisters    Gabapentin Anaphylaxis    Sulfa (sulfonamide antibiotics) Anaphylaxis    Diphenhydramine hcl      Mood changes    Levofloxacin Other (See Comments)     Muscle pain    Ceftriaxone Rash    Morphine Palpitations     Other reaction(s): Other (See Comments)  Elevated BP,  can take dilaudid and oxycodone     Past Medical History:   Diagnosis Date    Anesthesia complication     pt states she needs a pediatric ET tube used due to past airway complication (trauma from tube)    Anticoagulant long-term use     Arthritis     rhuematoid arthritis, osteoarthritis    Asthma     Chronic nausea     Difficult intubation     Dysautonomia     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    Endometriosis     GERD (gastroesophageal reflux disease)      History of kidney stones     Hypertension     Lupus     Migraine headache     MTHFR mutation     Ovarian cyst     Panic attack as reaction to stress     POTS (postural orthostatic tachycardia syndrome)     Pulmonary embolism 2015    Respiratory distress     Thyroid disease     hypothyroid     Past Surgical History:   Procedure Laterality Date    ANKLE SURGERY Right 2011    reconstruction after avulsion and fracture    CHROMOTUBATION OF FALLOPIAN TUBE N/A 5/15/2023    Procedure: CHROMOTUBATION, OVIDUCT;  Surgeon: Israel Bernstein MD;  Location: [facility] OR;  Service: OB/GYN;  Laterality: N/A;    DIAGNOSTIC LAPAROSCOPY Right 07/31/2018    Procedure: LAPAROSCOPY, DIAGNOSTIC;  Surgeon: Vane Baez MD;  Location: [facility] OR;  Service: OB/GYN;  Laterality: Right;    DIAGNOSTIC LAPAROSCOPY N/A 5/15/2023    Procedure: LAPAROSCOPY, DIAGNOSTIC;  Surgeon: Israel Bernstein MD;  Location: [facility] OR;  Service: OB/GYN;  Laterality: N/A;    FRACTURE SURGERY      r ankle    JOINT REPLACEMENT      knee    KNEE ARTHROSCOPY Right 2006/2011/2013    KNEE ARTHROSCOPY W/ MENISCAL REPAIR Right     Replacement of meniscus and bone grafts    LAPAROSCOPIC APPENDECTOMY N/A 5/15/2023    Procedure: APPENDECTOMY, LAPAROSCOPIC;  Surgeon: Israel Bernstein MD;  Location: [facility];  Service: OB/GYN;  Laterality: N/A;  performed by Dr. Dias    LAPAROSCOPIC LYSIS OF ADHESIONS N/A 5/15/2023    Procedure: LYSIS, ADHESIONS, LAPAROSCOPIC;  Surgeon: Israel Bernstein MD;  Location: [facility] OR;  Service: OB/GYN;  Laterality: N/A;    LAPAROSCOPIC SALPINGO-OOPHORECTOMY Right 07/31/2018    Procedure: SALPINGO-OOPHORECTOMY, LAPAROSCOPIC;  Surgeon: Vane Baez MD;  Location: [facility] OR;  Service: OB/GYN;  Laterality: Right;    SURGICAL REMOVAL OF ENDOMETRIOMA Bilateral 5/15/2023    Procedure: EXCISION, ENDOMETRIOMA;  Surgeon: Israel Bernstein MD;  Location: [facility] OR;  Service: OB/GYN;  Laterality: Bilateral;    TONSILLECTOMY  1994     Family History   Problem Relation Age of  Onset    Diabetes Mother     Ovarian cancer Mother     Fibromyalgia Mother     Heart disease Father     Diabetes Father     Heart attack Father     Hyperlipidemia Father      Social History     Tobacco Use    Smoking status: Never    Smokeless tobacco: Never   Substance Use Topics    Alcohol use: No    Drug use: No     Review of Systems  Constitutional-positive fever  HEENT-no congestion  Eyes-no redness  Respiratory-no shortness of breath  Cardio-no chest pain  GI-positive abdominal pain positive constipation  Endocrine-no cold intolerance  -no difficulty urinating  MSK-no myalgias  Skin-positive bruising  Allergy-no environmental allergy  Neurologic-, no headache  Hematology-no swollen nodes  Behavioral-no confusion  Physical Exam     Initial Vitals [05/20/23 2129]   BP Pulse Resp Temp SpO2   139/82 (!) 111 20 98 °F (36.7 °C) 100 %      MAP       --         Physical Exam  Constitutional:  Uncomfortable appearing 32-year-old female in moderate distress  Eyes: Conjunctivae normal.  ENT       Head: Normocephalic, atraumatic.       Nose: Normal external appearance        Mouth/Throat: no strigulous respirations   Hematological/Lymphatic/Immunilogical: no visible lymphadenopathy   Cardiovascular:  Rapid rate,   Respiratory: Normal respiratory effort.   Gastrointestinal:  Rounded, well-approximated surgical incision sites at the umbilicus, left lower quadrant, right lower quadrant, the right lower quadrant demonstrates marked ecchymoses extending inferior to the incision site  Marked tenderness to palpation diffusely most prominent in the right lower quadrant  Musculoskeletal: Normal range of motion in all extremities. No obvious deformities or swelling.  Neurologic: Alert, oriented. Normal speech and language. No gross focal neurologic deficits are appreciated.  Skin: Skin is warm, dry. No rash noted.  Psychiatric: Mood and affect are normal.   ED Course   Procedures  Labs Reviewed   CBC W/ AUTO DIFFERENTIAL -  Abnormal; Notable for the following components:       Result Value    RBC 3.71 (*)     Hemoglobin 10.8 (*)     Hematocrit 33.7 (*)     Gran # (ANC) 8.7 (*)     Immature Grans (Abs) 0.06 (*)     Mono # 1.3 (*)     Gran % 73.1 (*)     Lymph % 14.1 (*)     All other components within normal limits   URINALYSIS, REFLEX TO URINE CULTURE - Abnormal; Notable for the following components:    Specific Gravity, UA <=1.005 (*)     Occult Blood UA 2+ (*)     Leukocytes, UA 1+ (*)     All other components within normal limits    Narrative:     Specimen Source->Urine   URINALYSIS MICROSCOPIC - Abnormal; Notable for the following components:    RBC, UA 5 (*)     WBC, UA 11 (*)     All other components within normal limits    Narrative:     Specimen Source->Urine   COMPREHENSIVE METABOLIC PANEL - Abnormal; Notable for the following components:    Calcium 8.5 (*)     Albumin 3.1 (*)     Alkaline Phosphatase 54 (*)     Anion Gap 7 (*)     All other components within normal limits   CULTURE, URINE   LACTATE DEHYDROGENASE   LACTIC ACID, PLASMA   POCT URINE PREGNANCY   TYPE & SCREEN          Imaging Results              CT Abdomen Pelvis With Contrast (Final result)  Result time 05/21/23 01:05:36      Final result by Jossue Hendricks MD (05/21/23 01:05:36)                   Impression:      1. Right lower quadrant/adnexal 6 x 4 cm air and fluid collection with associated surrounding inflammatory changes and small volume free fluid.  Findings likely represent developing abscess.  This does not appear completely organized with no rim enhancement seen at this time.  2. Few small scattered foci of intraperitoneal free air.  This is presumably related to recent postoperative status, however potential perforation is unfortunately not excluded.  3. Mild wall thickening and inflammatory changes seen surrounding the urinary bladder.  This may at least in part relate to nondistention and aforementioned findings, however acute cystitis or  reactive cystitis is a possibility.  Correlate with clinical symptoms and urinalysis.      Electronically signed by: Jossue Hendricks MD  Date:    05/21/2023  Time:    01:05               Narrative:    EXAMINATION:  CT ABDOMEN PELVIS WITH CONTRAST    CLINICAL HISTORY:  Abdominal pain, post-op;    TECHNIQUE:  Low dose axial images, sagittal and coronal reformations were obtained from the lung bases to the pubic symphysis following the IV administration of 75 mL of Omnipaque 350 .  Oral contrast was not given.    COMPARISON:  CT abdomen and pelvis from July 2019.    FINDINGS:  The visualized portion of the heart is unremarkable.  The lung bases are clear.    No significant hepatic abnormalities are identified.  There is no intra-or extrahepatic biliary ductal dilatation.  The gallbladder is unremarkable.  The stomach, pancreas, spleen, and adrenal glands are unremarkable.  Small accessory splenules are seen.    Kidneys enhance normally.  Ureters are difficult to track distally.  Urinary bladder is nondistended with mild wall thickening and surrounding inflammatory changes, noting findings discussed below.  Uterus is unremarkable.    There is an air and fluid collection seen in the right lower quadrant/adnexal region measuring at least 6 x 4 cm in maximum transverse dimensions.  Small amount of surrounding inflammatory change and free fluid are seen.  There is no organized rim enhancement identified at this time.  Few small scattered foci of free air are seen.  Postsurgical change is seen at the base of the cecum which may reflect prior appendectomy.  Appendix is not visualized.  Moderate volume retained stool is seen throughout the colon.  No evidence of bowel obstruction.    Aorta tapers normally.    No acute osseous abnormality identified.    Subcutaneous soft tissue thickening and stranding are seen involving the anterior abdominal wall, more pronounced on the right.  This is presumably related to recent  postoperative change.  No focal fluid collection or rim enhancing abscess seen.                                       Medications   sodium chloride 0.9% flush 10 mL (has no administration in time range)   ondansetron disintegrating tablet 8 mg (has no administration in time range)   prochlorperazine injection Soln 5 mg (has no administration in time range)   oxyCODONE immediate release tablet 5 mg (5 mg Oral Not Given 5/21/23 0647)   oxyCODONE immediate release tablet 10 mg (10 mg Oral Given 5/21/23 1034)   piperacillin-tazobactam (ZOSYN) 4.5 g in dextrose 5 % in water (D5W) 5 % 100 mL IVPB (MB+) (0 g Intravenous Stopped 5/21/23 1018)   albuterol-ipratropium 2.5 mg-0.5 mg/3 mL nebulizer solution 3 mL (has no administration in time range)   budesonide nebulizer solution 0.5 mg (0.5 mg Nebulization Given 5/21/23 0745)   docusate sodium capsule 100 mg (has no administration in time range)   hydrOXYchloroQUINE tablet 200 mg (0 mg Oral Hold 5/21/23 0900)   losartan tablet 25 mg (25 mg Oral Given 5/21/23 0841)   ketorolac injection 15 mg (15 mg Intravenous Given 5/21/23 1142)   sodium chloride 0.9% bolus 1,000 mL 1,000 mL (0 mLs Intravenous Stopped 5/20/23 2341)   HYDROmorphone injection 1 mg (1 mg Intravenous Given 5/21/23 0210)   iohexoL (OMNIPAQUE 350) injection 75 mL (75 mLs Intravenous Given 5/21/23 0028)   piperacillin-tazobactam (ZOSYN) 4.5 g in dextrose 5 % in water (D5W) 5 % 100 mL IVPB (MB+) (0 g Intravenous Stopped 5/21/23 0200)     Medical Decision Making:   History:   I obtained history from: someone other than patient.       <> Summary of History: Significant other at the bedside relays fever history  Old Medical Records: I decided to obtain old medical records.  Old Records Summarized: records from clinic visits and records from previous admission(s).  Differential Diagnosis:   Abscess, seroma, anemia, postop complication, obstruction  Independently Interpreted Test(s):   I have ordered and independently  interpreted X-rays - see prior notes.  I have ordered and independently interpreted EKG Reading(s) - see prior notes  Clinical Tests:   Lab Tests: Ordered and Reviewed  Radiological Study: Ordered and Reviewed  Medical Tests: Ordered and Reviewed  ED Management:  Uncomfortable appearing 32-year-old female with reported fevers, tachycardia postoperatively.    Concerning abdominal examination with worsening bruising in the right lower quadrant of the abdomen.    Given the concern for postoperative complication including seroma, abscess, obstruction will obtain labs, cultures, imaging of the abdomen and pelvis.    Discussed the case with the on-call gynecology service pending CT imaging of the abdomen and pelvis at this time.    Unfortunately a delay in imaging resulted following cancellation of chemistry.    Patient care signed out pending disposition, Gyn has seen and evaluated patient. Will followup results.                         Clinical Impression:   Final diagnoses:  [R07.9] Chest pain  [G89.18] Post-op pain (Primary)  [R50.9] Fever, unspecified fever cause  [R10.31] Right lower quadrant abdominal pain        ED Disposition Condition    Observation Stable                Edward Montano MD  05/21/23 0438

## 2023-05-21 NOTE — PROGRESS NOTES
Pt on RA;SPO2 96% with no distress.Daily treatment given tolerated well.No changes in therapy.Will continue to monitor.

## 2023-05-21 NOTE — CONSULTS
[facility] - Emergency Dept  Obstetrics & Gynecology  Consult Note    Patient Name: Justine Akins  MRN: [id number]  Admission Date: 5/20/2023  Hospital Length of Stay: 0 days  Code Status: Full Code  Primary Care Provider: Reji Faith MD  Principal Problem: <principal problem not specified>    Consults  Justine Akins is a 32 y.o. F who presented to [facility] on POD#6 from lap cystectomy/appendectomy/lysis of adhesions for evaluation of worsening RLQ pain. She was found to have a possible 6cm x 4cm right ovarian abscess on CT scan, however, her surgery possibly involved use of Surgicel powder in that area and so she was admitted for observation for further monitoring of pain, vitals, and introduction of IV antibiotics.     Please see full H&P for further details.

## 2023-05-22 VITALS
HEART RATE: 100 BPM | SYSTOLIC BLOOD PRESSURE: 100 MMHG | WEIGHT: 167.56 LBS | OXYGEN SATURATION: 96 % | RESPIRATION RATE: 17 BRPM | HEIGHT: 61 IN | TEMPERATURE: 98 F | BODY MASS INDEX: 31.63 KG/M2 | DIASTOLIC BLOOD PRESSURE: 59 MMHG

## 2023-05-22 PROBLEM — G89.18 POST-OP PAIN: Status: ACTIVE | Noted: 2023-05-22

## 2023-05-22 PROCEDURE — 25000003 PHARM REV CODE 250: Performed by: STUDENT IN AN ORGANIZED HEALTH CARE EDUCATION/TRAINING PROGRAM

## 2023-05-22 PROCEDURE — 94761 N-INVAS EAR/PLS OXIMETRY MLT: CPT

## 2023-05-22 PROCEDURE — G0378 HOSPITAL OBSERVATION PER HR: HCPCS

## 2023-05-22 PROCEDURE — 94640 AIRWAY INHALATION TREATMENT: CPT

## 2023-05-22 PROCEDURE — 63600175 PHARM REV CODE 636 W HCPCS: Performed by: STUDENT IN AN ORGANIZED HEALTH CARE EDUCATION/TRAINING PROGRAM

## 2023-05-22 PROCEDURE — 96366 THER/PROPH/DIAG IV INF ADDON: CPT

## 2023-05-22 PROCEDURE — 99239 PR HOSPITAL DISCHARGE DAY,>30 MIN: ICD-10-PCS | Mod: ,,, | Performed by: OBSTETRICS & GYNECOLOGY

## 2023-05-22 PROCEDURE — 96376 TX/PRO/DX INJ SAME DRUG ADON: CPT

## 2023-05-22 PROCEDURE — 25000242 PHARM REV CODE 250 ALT 637 W/ HCPCS: Performed by: STUDENT IN AN ORGANIZED HEALTH CARE EDUCATION/TRAINING PROGRAM

## 2023-05-22 PROCEDURE — 99239 HOSP IP/OBS DSCHRG MGMT >30: CPT | Mod: ,,, | Performed by: OBSTETRICS & GYNECOLOGY

## 2023-05-22 PROCEDURE — 63600175 PHARM REV CODE 636 W HCPCS: Performed by: GENERAL PRACTICE

## 2023-05-22 RX ORDER — OXYCODONE HYDROCHLORIDE 10 MG/1
10 TABLET ORAL EVERY 6 HOURS PRN
Qty: 15 TABLET | Refills: 0 | Status: SHIPPED | OUTPATIENT
Start: 2023-05-22 | End: 2023-07-14

## 2023-05-22 RX ORDER — KETOROLAC TROMETHAMINE 10 MG/1
10 TABLET, FILM COATED ORAL EVERY 6 HOURS
Qty: 20 TABLET | Refills: 0 | Status: SHIPPED | OUTPATIENT
Start: 2023-05-22 | End: 2023-05-27

## 2023-05-22 RX ORDER — KETOROLAC TROMETHAMINE 30 MG/ML
15 INJECTION, SOLUTION INTRAMUSCULAR; INTRAVENOUS ONCE
Status: COMPLETED | OUTPATIENT
Start: 2023-05-22 | End: 2023-05-22

## 2023-05-22 RX ADMIN — BUDESONIDE 0.5 MG: 0.5 INHALANT RESPIRATORY (INHALATION) at 07:05

## 2023-05-22 RX ADMIN — OXYCODONE HYDROCHLORIDE 10 MG: 5 TABLET ORAL at 06:05

## 2023-05-22 RX ADMIN — PIPERACILLIN AND TAZOBACTAM 4.5 G: 4; .5 INJECTION, POWDER, LYOPHILIZED, FOR SOLUTION INTRAVENOUS; PARENTERAL at 06:05

## 2023-05-22 RX ADMIN — SODIUM CHLORIDE: 9 INJECTION, SOLUTION INTRAVENOUS at 12:05

## 2023-05-22 RX ADMIN — DOCUSATE SODIUM 200 MG: 100 CAPSULE, LIQUID FILLED ORAL at 09:05

## 2023-05-22 RX ADMIN — KETOROLAC TROMETHAMINE 30 MG: 30 INJECTION, SOLUTION INTRAMUSCULAR; INTRAVENOUS at 06:05

## 2023-05-22 RX ADMIN — SIMETHICONE 80 MG: 80 TABLET, CHEWABLE ORAL at 09:05

## 2023-05-22 RX ADMIN — SIMETHICONE 80 MG: 80 TABLET, CHEWABLE ORAL at 02:05

## 2023-05-22 RX ADMIN — HYDROMORPHONE HYDROCHLORIDE 1 MG: 1 INJECTION, SOLUTION INTRAMUSCULAR; INTRAVENOUS; SUBCUTANEOUS at 02:05

## 2023-05-22 RX ADMIN — LOSARTAN POTASSIUM 25 MG: 25 TABLET, FILM COATED ORAL at 09:05

## 2023-05-22 RX ADMIN — KETOROLAC TROMETHAMINE 15 MG: 30 INJECTION, SOLUTION INTRAMUSCULAR; INTRAVENOUS at 01:05

## 2023-05-22 RX ADMIN — OXYCODONE HYDROCHLORIDE 10 MG: 5 TABLET ORAL at 04:05

## 2023-05-22 RX ADMIN — OXYCODONE HYDROCHLORIDE 10 MG: 5 TABLET ORAL at 10:05

## 2023-05-22 NOTE — NURSING
Pt given verbal and written discharge instructions including new prescriptions and follow-up appointment. Pt verbalized an understanding of instructions. No apparent barriers to discharge noted.

## 2023-05-22 NOTE — PLAN OF CARE
Problem: Adult Inpatient Plan of Care  Goal: Plan of Care Review  Outcome: Met  Goal: Patient-Specific Goal (Individualized)  Outcome: Met  Goal: Absence of Hospital-Acquired Illness or Injury  Outcome: Met  Goal: Optimal Comfort and Wellbeing  Outcome: Met  Goal: Readiness for Transition of Care  Outcome: Met     Problem: Fall Injury Risk  Goal: Absence of Fall and Fall-Related Injury  Outcome: Met     Problem: Pain Acute  Goal: Acceptable Pain Control and Functional Ability  Outcome: Met     Problem: Infection  Goal: Absence of Infection Signs and Symptoms  Outcome: Met     Adequate for Discharge

## 2023-05-22 NOTE — DISCHARGE SUMMARY
Discharge Summary  Gynecology      Admit Date: 5/20/2023    Discharge Date and Time: 5/22/2023     Attending Physician: Lacey Noriega MD    Principal Diagnoses: Post-op pain    Active Hospital Problems    Diagnosis  POA    *Post-op pain [G89.18]  Unknown      Resolved Hospital Problems   No resolved problems to display.       Discharged Condition: good    Hospital Course: Justine Akins is a 32 y.o. F who presented on POD#6 from lap cystectomy (endometrioma)/appendectomy/lysis of adhesions for evaluation of worsening RLQ pain. Patient was admitted for pain control and serial abdominal exams. Patient was started on IV Zosyn for a fluid collection noted in RLQ with concern for possible abscess. During her hospital stay, patient remained afebrile and had no signs or symptoms of infection. Zosyn was discontinued on hospital day 2 prior to discharge. Patient was able to void, ambulate, tolerate PO and pain was controlled with PO meds. Patient was given routine post-op instructions and return precautions for which patient voiced understanding. Patient was subsequently discharged.    Consults: None    Significant Diagnostic Studies:  Recent Labs   Lab 05/20/23  2236 05/21/23  0435   WBC 11.84 12.20   HGB 10.8* 9.6*   HCT 33.7* 30.0*   MCV 91 92    313          Disposition: Home or Self Care    Patient Instructions:   Current Discharge Medication List        START taking these medications    Details   ketorolac (TORADOL) 10 mg tablet Take 1 tablet (10 mg total) by mouth every 6 (six) hours. for 5 days  Qty: 20 tablet, Refills: 0      oxyCODONE (ROXICODONE) 10 mg Tab immediate release tablet Take 1 tablet (10 mg total) by mouth every 6 (six) hours as needed for Pain.  Qty: 15 tablet, Refills: 0    Comments: Quantity prescribed more than 7 day supply? No           CONTINUE these medications which have NOT CHANGED    Details   albuterol-ipratropium (DUO-NEB) 2.5 mg-0.5 mg/3 mL nebulizer solution Take 3 mLs  by nebulization every 6 (six) hours as needed for Wheezing. Rescue  Qty: 1 Box, Refills: 2    Associated Diagnoses: Upper respiratory tract infection, unspecified type; Asthma, unspecified asthma severity, unspecified whether complicated, unspecified whether persistent      butalbital-acetaminophen-caff -40 mg Cap Take 1 capsule by mouth.      docusate sodium (COLACE) 100 MG capsule Take 1 capsule (100 mg total) by mouth 2 (two) times daily as needed for Constipation.  Qty: 30 capsule, Refills: 1      enoxaparin (LOVENOX) 40 mg/0.4 mL Syrg Inject 0.4 mLs (40 mg total) into the skin once daily.  Qty: 12 mL, Refills: 3    Associated Diagnoses: SLE (systemic lupus erythematosus related syndrome); Pulmonary thromboembolism      fish oil-omega-3 fatty acids 300-1,000 mg capsule Take by mouth once daily.      HYDROcodone-acetaminophen (NORCO) 5-325 mg per tablet Take 1 tablet by mouth every 12 (twelve) hours as needed.      hydrOXYchloroQUINE (PLAQUENIL) 200 mg tablet Take 1 tablet (200 mg total) by mouth once daily.  Qty: 30 tablet, Refills: 6      ibuprofen (ADVIL,MOTRIN) 600 MG tablet Take 1 tablet (600 mg total) by mouth every 6 (six) hours as needed for Pain.  Qty: 30 tablet, Refills: 3      losartan (COZAAR) 25 MG tablet Take 1 tablet (25 mg total) by mouth once daily.  Qty: 90 tablet, Refills: 3    Comments: .  Associated Diagnoses: Hypertension, unspecified type      magnesium oxide (MAG-OX) 400 mg (241.3 mg magnesium) tablet Take 400 mg by mouth once daily.      ondansetron (ZOFRAN) 4 MG tablet Take 1 tablet (4 mg total) by mouth every 8 (eight) hours as needed for Nausea.  Qty: 30 tablet, Refills: 1      ondansetron (ZOFRAN-ODT) 4 MG TbDL Take 1 tablet (4 mg total) by mouth every 6 (six) hours as needed (nausea).  Qty: 30 tablet, Refills: 1    Associated Diagnoses: Gastritis without bleeding, unspecified chronicity, unspecified gastritis type      oxyCODONE-acetaminophen (PERCOCET) 5-325 mg per tablet Take  1 tablet by mouth every 6 (six) hours as needed for Pain.  Qty: 30 tablet, Refills: 0    Comments: Quantity prescribed more than 7 day supply? No      aspirin (ECOTRIN) 81 MG EC tablet Take 81 mg by mouth once daily.      budesonide (PULMICORT) 0.5 mg/2 mL nebulizer solution Take 2 mLs (0.5 mg total) by nebulization once daily. Controller  Refills: 0    Associated Diagnoses: Asthma, unspecified asthma severity, unspecified whether complicated, unspecified whether persistent; Upper respiratory tract infection, unspecified type      cholecalciferol, vitamin D3, 5,000 unit Tab Take 5,000 Units by mouth once daily.              Discharge Procedure Orders   Diet general     Lifting restrictions   Order Comments: No lifting greater than 15 pounds for six weeks.     Other restrictions (specify):   Order Comments: PELVIC REST (IF YOU HAD A HYSTERECTOMY):  No douching, tampons, or intercourse for 6 weeks.    If prescribed, vaginal estrogen cream may be used during the postoperative period.     DRIVING:  No driving while on narcotics. Driving may be resumed initially with a competent passenger one to two weeks after surgery if no longer taking narcotics.     EXERCISE:  For six weeks your exercise should be limited to walking. You may walk as far as you wish, as long as you increase your level of exertion gradually and avoid slippery surfaces. You may climb stairs as needed to get around, but should not use stair climbing for exercise.     Remove dressing in 24 hours   Order Comments: If you have a bandage on wound, you may remove it the day after dismissal.  If you had steri-strips remove them once they begin to peel off (usually 2 weeks).  If your steri-strips still haven't come off in 2 weeks, please remove them.  Keep incision clean and dry.  Inspect the incision daily for signs and symptoms of infection.     Wound care routine (specify)   Order Comments: WOUND CARE:  If you have a band-aid or bandage on your wound, you  may remove it the day after dismissal.  You may wash the wound with mild soap and water.   You may shower at any time but should avoid immersing any abdominal incisions in water for at least two weeks after surgery or until the wound is completely healed. If given, please shower with Hibiclens soap until bottle is completely finished. Keep your wound clean and dry.  You should observe your incision for signs of infection which include redness, warmth, drainage or fever.     Call MD for:  temperature >100.4     Call MD for:  persistent nausea and vomiting     Call MD for:  severe uncontrolled pain     Call MD for:  difficulty breathing, headache or visual disturbances     Call MD for:  redness, tenderness, or signs of infection (pain, swelling, redness, odor or green/yellow discharge around incision site)     Call MD for:  hives     Call MD for:   Order Comments: Inability to void,urine is ketchup colored or you have large clots, vaginal bleeding is heavier than a period.        CONSTIPATION REMEDIES: Patients are often constipated after surgery or with use of oral narcotic medicine. You should continue to take the stool softener, Senokot-S during the next six weeks, and consume adequate amounts of water.  If you have not had a bowel movement for 3 days after dismissal, or are uncomfortable and unable to pass stool, please try one or all of the following measures:  1.  Milk of Magnesia - 30 cc by mouth every 12 hours   2.  Dulcolax suppository - One suppository per rectum every 4-6 hours   3.  Metamucil, Fibercon or other bulk former - use as directed  4.  Fleets Enema      PAIN MEDICATIONS:     Take your pain medications as instructed. It is best to take pain medications before your pain becomes severe. This will allow you to take less medication yet have better pain relief. For the first 2 or 3 days it may be helpful to take your pain medications on a regular schedule (e.g. every 4 to 6 hours). This will help you  to keep your pain under better control. You should then begin to take fewer medications each day until you no longer need them. Do not take pain medication on an empty stomach. This may lead to nausea and vomiting.     Activity as tolerated   Order Comments: DRIVING:  No driving while on narcotics. Driving may be resumed initially with a competent passenger one to two weeks after surgery if no longer taking narcotics.     EXERCISE:  For six weeks your exercise should be limited to walking. You may walk as far as you wish, as long as you increase your level of exertion gradually and avoid slippery surfaces. You may climb stairs as needed to get around, but should not use stair climbing for exercise.     Shower on day dressing removed (No bath)   Order Comments: You may shower at any time but should avoid immersing any abdominal incisions in water for at least 2 weeks after surgery or until the wound is completely healed.  If given, please shower with Hibaclens soap until bottle is completely finish        Follow-up Information       Israel Bernstein MD Follow up on 6/22/2023.    Specialties: Obstetrics, Obstetrics and Gynecology  Why: Post op visit  Contact information:  [address] [address]  [phone]                           Edgardo Goodson MD  OBGYN PGY-1

## 2023-05-22 NOTE — PROGRESS NOTES
[facility])  Obstetrics & Gynecology  Progress Note    Patient Name: Justine Akins  MRN: [id number]  Admission Date: 5/20/2023  Primary Care Provider: Reji Faith MD  Principal Problem: <principal problem not specified>    Subjective:      History of Present Illness:   Justine Akins is a 32 y.o. F who presents on POD#6 from lap cystectomy (endometrioma)/appendectomy/lysis of adhesions for evaluation of worsening RLQ pain. She reports that her post op course was uncomplicated until this past Wednesday when she began having worsening RLQ pain. She reports subjective fevers at home in the 100s but denies any nausea or vomiting, chest pain or SOB, vaginal discharge or irritation. She also denies urinary or bowel complaints. She is tolerating PO intake normally and is passing flatus and having bowel movements.      She also reports a history of PE in the past for which she was on lovenox until 2-3 days pre-op and has not resumed the medication. She reports these symptoms are non-concerning for PE.      She also reports a bruise at her RLQ incision that has not expanded over the past few days but was concerning for her.      Interval History: Patient is POD#7 s/p lap cystectomy endometrioma)/appendectomy/lysis of adhesions had no acute events overnight. Reports pain is improved but unsure how much of a change it is since she has been getting pain medication around the clock. She denies fever, chills, nausea, or vomiting.    Scheduled Meds:   budesonide  0.5 mg Nebulization Daily    docusate sodium  200 mg Oral BID    hydrOXYchloroQUINE  200 mg Oral Daily    ketorolac  30 mg Intravenous Q6H    losartan  25 mg Oral Daily    piperacillin-tazobactam (ZOSYN) IVPB  4.5 g Intravenous Q8H    simethicone  1 tablet Oral TID     Continuous Infusions:   sodium chloride 0.9% 75 mL/hr at 05/22/23 0002     PRN Meds:albuterol-ipratropium, HYDROmorphone, ondansetron, oxyCODONE, oxyCODONE,  prochlorperazine, sodium chloride 0.9%    Review of patient's allergies indicates:   Allergen Reactions    Adhesive Blisters     Skin peels and blisters    Gabapentin Anaphylaxis    Sulfa (sulfonamide antibiotics) Anaphylaxis    Diphenhydramine hcl      Mood changes    Levofloxacin Other (See Comments)     Muscle pain    Ceftriaxone Rash    Morphine Palpitations     Other reaction(s): Other (See Comments)  Elevated BP,  can take dilaudid and oxycodone       Objective:     Vital Signs (Most Recent):  Temp: 97.7 °F (36.5 °C) (05/22/23 0405)  Pulse: 99 (05/22/23 0405)  Resp: 19 (05/22/23 0405)  BP: 105/63 (05/22/23 0405)  SpO2: 98 % (05/22/23 0405) Vital Signs (24h Range):  Temp:  [97.7 °F (36.5 °C)-100 °F (37.8 °C)] 97.7 °F (36.5 °C)  Pulse:  [] 99  Resp:  [15-20] 19  SpO2:  [93 %-98 %] 98 %  BP: (101-121)/(56-75) 105/63     Weight: 76 kg (167 lb 8.8 oz)  Body mass index is 31.66 kg/m².  Patient's last menstrual period was 04/14/2023.    I&O (Last 24H):  No intake or output data in the 24 hours ending 05/22/23 0555    Physical Exam:   Constitutional: She is oriented to person, place, and time. She appears well-developed and well-nourished.    HENT:   Head: Normocephalic and atraumatic.    Eyes: EOM are normal.     Cardiovascular:  Normal rate.             Pulmonary/Chest: Effort normal. No respiratory distress.        Abdominal: There is abdominal tenderness (minimal tenderness). There is no guarding.                 Neurological: She is alert and oriented to person, place, and time.    Skin: Skin is warm and dry.    Psychiatric: She has a normal mood and affect. Her behavior is normal. Thought content normal.     Laboratory:  CBC:   Recent Labs   Lab 05/21/23  0435   WBC 12.20   RBC 3.28*   HGB 9.6*   HCT 30.0*      MCV 92   MCH 29.3   MCHC 32.0     CMP:   Recent Labs   Lab 05/20/23  2342      CALCIUM 8.5*   ALBUMIN 3.1*   PROT 6.2      K 4.7   CO2 23      BUN 8   CREATININE 0.7    ALKPHOS 54*   ALT 20   AST 17   BILITOT 0.4     I have personallly reviewed all pertinent lab results from the last 24 hours.    Diagnostic Results:  Labs: Reviewed  CT: Reviewed  Imaging Results              CT Abdomen Pelvis With Contrast (Final result)  Result time 05/21/23 01:05:36      Final result by Jossue Hendricks MD (05/21/23 01:05:36)                   Impression:      1. Right lower quadrant/adnexal 6 x 4 cm air and fluid collection with associated surrounding inflammatory changes and small volume free fluid.  Findings likely represent developing abscess.  This does not appear completely organized with no rim enhancement seen at this time.  2. Few small scattered foci of intraperitoneal free air.  This is presumably related to recent postoperative status, however potential perforation is unfortunately not excluded.  3. Mild wall thickening and inflammatory changes seen surrounding the urinary bladder.  This may at least in part relate to nondistention and aforementioned findings, however acute cystitis or reactive cystitis is a possibility.  Correlate with clinical symptoms and urinalysis.      Electronically signed by: Jossue Hendricks MD  Date:    05/21/2023  Time:    01:05               Narrative:    EXAMINATION:  CT ABDOMEN PELVIS WITH CONTRAST    CLINICAL HISTORY:  Abdominal pain, post-op;    TECHNIQUE:  Low dose axial images, sagittal and coronal reformations were obtained from the lung bases to the pubic symphysis following the IV administration of 75 mL of Omnipaque 350 .  Oral contrast was not given.    COMPARISON:  CT abdomen and pelvis from July 2019.    FINDINGS:  The visualized portion of the heart is unremarkable.  The lung bases are clear.    No significant hepatic abnormalities are identified.  There is no intra-or extrahepatic biliary ductal dilatation.  The gallbladder is unremarkable.  The stomach, pancreas, spleen, and adrenal glands are unremarkable.  Small accessory splenules are  seen.    Kidneys enhance normally.  Ureters are difficult to track distally.  Urinary bladder is nondistended with mild wall thickening and surrounding inflammatory changes, noting findings discussed below.  Uterus is unremarkable.    There is an air and fluid collection seen in the right lower quadrant/adnexal region measuring at least 6 x 4 cm in maximum transverse dimensions.  Small amount of surrounding inflammatory change and free fluid are seen.  There is no organized rim enhancement identified at this time.  Few small scattered foci of free air are seen.  Postsurgical change is seen at the base of the cecum which may reflect prior appendectomy.  Appendix is not visualized.  Moderate volume retained stool is seen throughout the colon.  No evidence of bowel obstruction.    Aorta tapers normally.    No acute osseous abnormality identified.    Subcutaneous soft tissue thickening and stranding are seen involving the anterior abdominal wall, more pronounced on the right.  This is presumably related to recent postoperative change.  No focal fluid collection or rim enhancing abscess seen.                                       Assessment/Plan:     Post op Pain/Possible Abscess v Surgicel Powder collection  - Patient afebrile, VSS.   - Continue Zosyn. Will consider consulting IR for drainage.   - IVF @ 75 cc/h  - Diet: NPO  - Pain control: Toradol x 4 doses, oxy PRN, dilaudid PRN,   - Bowel function: +flatus/BM   - PRN: simethicone, zofran, phenergan, benadryl    Hypertension  - Asymptomatic  - continue losartan 25 mg daily    SLE  -Continue plaquenil 200 mg daily    Asthma  - Albuterol PRN   - Budesonide daily      Edgardo Goodson MD  Obstetrics & Gynecology  [facility] ([place])

## 2023-05-22 NOTE — CARE UPDATE
05/22/23 0753   Patient Assessment/Suction   Level of Consciousness (AVPU) alert   Respiratory Effort Normal;Unlabored   Expansion/Accessory Muscles/Retractions no retractions;no use of accessory muscles   All Lung Fields Breath Sounds clear;equal bilaterally   PRE-TX-O2   Device (Oxygen Therapy) room air   SpO2 96 %   Pulse Oximetry Type Intermittent   $ Pulse Oximetry - Multiple Charge Pulse Oximetry - Multiple   Pulse 100   Resp 17   Temp 98.3 °F (36.8 °C)   BP (!) 100/59   Aerosol Therapy   $ Aerosol Therapy Charges Aerosol Treatment   Daily Review of Necessity (SVN) completed   Respiratory Treatment Status (SVN) given   Treatment Route (SVN) air;mouthpiece   Patient Position (SVN) HOB elevated   Post Treatment Assessment (SVN) breath sounds unchanged   Signs of Intolerance (SVN) none

## 2023-05-22 NOTE — PLAN OF CARE
Appts have been scheduled and added to the AVS.  Patient family will transport at discharge.   05/22/23 1444   Final Note   Assessment Type Final Discharge Note   Anticipated Discharge Disposition Home   Hospital Resources/Appts/Education Provided Appointments scheduled and added to AVS   Post-Acute Status   Discharge Delays None known at this time

## 2023-05-24 LAB
BACTERIA UR CULT: ABNORMAL
BACTERIA UR CULT: ABNORMAL
COMMENT: NORMAL
FINAL PATHOLOGIC DIAGNOSIS: NORMAL
GROSS: NORMAL
Lab: NORMAL

## 2023-05-26 LAB
BACTERIA BLD CULT: NORMAL
BACTERIA BLD CULT: NORMAL

## 2023-06-12 ENCOUNTER — TELEPHONE (OUTPATIENT)
Dept: SURGERY | Facility: CLINIC | Age: 33
End: 2023-06-12
Payer: COMMERCIAL

## 2023-06-12 NOTE — TELEPHONE ENCOUNTER
I called patient to inquire where the mole is that she's coming to see Dr. Schaeffer tomorrow.  Patient stated that she's not sure if it's a mole, but it's on her toe.  I informed her that Dr. Schaeffer doesn't treat feet and that she'll need to see a Podiatrist.  Patient understood.  Connor

## 2023-06-22 ENCOUNTER — OFFICE VISIT (OUTPATIENT)
Dept: OBSTETRICS AND GYNECOLOGY | Facility: CLINIC | Age: 33
End: 2023-06-22
Payer: COMMERCIAL

## 2023-06-22 VITALS
SYSTOLIC BLOOD PRESSURE: 136 MMHG | DIASTOLIC BLOOD PRESSURE: 96 MMHG | WEIGHT: 160.06 LBS | BODY MASS INDEX: 30.24 KG/M2

## 2023-06-22 DIAGNOSIS — N80.129 ENDOMETRIOMA OF OVARY: ICD-10-CM

## 2023-06-22 DIAGNOSIS — Z09 POSTOPERATIVE EXAMINATION: Primary | ICD-10-CM

## 2023-06-22 PROBLEM — N80.9 ENDOMETRIOSIS: Status: RESOLVED | Noted: 2018-07-31 | Resolved: 2023-06-22

## 2023-06-22 PROBLEM — Z87.42 S/P OVARIAN CYSTECTOMY: Status: RESOLVED | Noted: 2023-05-15 | Resolved: 2023-06-22

## 2023-06-22 PROBLEM — N83.292 COMPLEX CYST OF LEFT OVARY: Status: RESOLVED | Noted: 2017-12-29 | Resolved: 2023-06-22

## 2023-06-22 PROBLEM — Z98.890 S/P OVARIAN CYSTECTOMY: Status: RESOLVED | Noted: 2023-05-15 | Resolved: 2023-06-22

## 2023-06-22 PROBLEM — G89.18 POST-OP PAIN: Status: RESOLVED | Noted: 2023-05-22 | Resolved: 2023-06-22

## 2023-06-22 PROCEDURE — 1160F RVW MEDS BY RX/DR IN RCRD: CPT | Mod: CPTII,S$GLB,, | Performed by: OBSTETRICS & GYNECOLOGY

## 2023-06-22 PROCEDURE — 3008F PR BODY MASS INDEX (BMI) DOCUMENTED: ICD-10-PCS | Mod: CPTII,S$GLB,, | Performed by: OBSTETRICS & GYNECOLOGY

## 2023-06-22 PROCEDURE — 3075F PR MOST RECENT SYSTOLIC BLOOD PRESS GE 130-139MM HG: ICD-10-PCS | Mod: CPTII,S$GLB,, | Performed by: OBSTETRICS & GYNECOLOGY

## 2023-06-22 PROCEDURE — 99999 PR PBB SHADOW E&M-EST. PATIENT-LVL III: ICD-10-PCS | Mod: PBBFAC,,, | Performed by: OBSTETRICS & GYNECOLOGY

## 2023-06-22 PROCEDURE — 3080F DIAST BP >= 90 MM HG: CPT | Mod: CPTII,S$GLB,, | Performed by: OBSTETRICS & GYNECOLOGY

## 2023-06-22 PROCEDURE — 1160F PR REVIEW ALL MEDS BY PRESCRIBER/CLIN PHARMACIST DOCUMENTED: ICD-10-PCS | Mod: CPTII,S$GLB,, | Performed by: OBSTETRICS & GYNECOLOGY

## 2023-06-22 PROCEDURE — 3080F PR MOST RECENT DIASTOLIC BLOOD PRESSURE >= 90 MM HG: ICD-10-PCS | Mod: CPTII,S$GLB,, | Performed by: OBSTETRICS & GYNECOLOGY

## 2023-06-22 PROCEDURE — 1159F PR MEDICATION LIST DOCUMENTED IN MEDICAL RECORD: ICD-10-PCS | Mod: CPTII,S$GLB,, | Performed by: OBSTETRICS & GYNECOLOGY

## 2023-06-22 PROCEDURE — 99024 POSTOP FOLLOW-UP VISIT: CPT | Mod: S$GLB,,, | Performed by: OBSTETRICS & GYNECOLOGY

## 2023-06-22 PROCEDURE — 3008F BODY MASS INDEX DOCD: CPT | Mod: CPTII,S$GLB,, | Performed by: OBSTETRICS & GYNECOLOGY

## 2023-06-22 PROCEDURE — 1159F MED LIST DOCD IN RCRD: CPT | Mod: CPTII,S$GLB,, | Performed by: OBSTETRICS & GYNECOLOGY

## 2023-06-22 PROCEDURE — 99024 PR POST-OP FOLLOW-UP VISIT: ICD-10-PCS | Mod: S$GLB,,, | Performed by: OBSTETRICS & GYNECOLOGY

## 2023-06-22 PROCEDURE — 3075F SYST BP GE 130 - 139MM HG: CPT | Mod: CPTII,S$GLB,, | Performed by: OBSTETRICS & GYNECOLOGY

## 2023-06-22 PROCEDURE — 4010F ACE/ARB THERAPY RXD/TAKEN: CPT | Mod: CPTII,S$GLB,, | Performed by: OBSTETRICS & GYNECOLOGY

## 2023-06-22 PROCEDURE — 99999 PR PBB SHADOW E&M-EST. PATIENT-LVL III: CPT | Mod: PBBFAC,,, | Performed by: OBSTETRICS & GYNECOLOGY

## 2023-06-22 PROCEDURE — 4010F PR ACE/ARB THEARPY RXD/TAKEN: ICD-10-PCS | Mod: CPTII,S$GLB,, | Performed by: OBSTETRICS & GYNECOLOGY

## 2023-06-22 RX ORDER — NORETHINDRONE 5 MG/1
5 TABLET ORAL DAILY
Qty: 30 TABLET | Refills: 5 | Status: SHIPPED | OUTPATIENT
Start: 2023-06-22 | End: 2023-12-19

## 2023-06-26 NOTE — PROGRESS NOTES
CC: Postop visit    Justine Akins is a 32 y.o. female No obstetric history on file. post-op from a Dx LSC, lysis of adhesions, ovarian cyst drainage on 5/15/23.  Patient is doing well postoperatively.  No pain.  No bowel or bladder complaints.  No fever.  Feels better than she ever has.    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    Endometriosis     GERD (gastroesophageal reflux disease)     History of kidney stones     Hypertension     Lupus     Migraine headache     MTHFR mutation     Ovarian cyst     Panic attack as reaction to stress     POTS (postural orthostatic tachycardia syndrome)     Pulmonary embolism 2015    Respiratory distress     Thyroid disease     hypothyroid     Past Surgical History:   Procedure Laterality Date    ANKLE SURGERY Right 2011    reconstruction after avulsion and fracture    CHROMOTUBATION OF FALLOPIAN TUBE N/A 5/15/2023    Procedure: CHROMOTUBATION, OVIDUCT;  Surgeon: Israel Bernstein MD;  Location: [facility] OR;  Service: OB/GYN;  Laterality: N/A;    DIAGNOSTIC LAPAROSCOPY Right 07/31/2018    Procedure: LAPAROSCOPY, DIAGNOSTIC;  Surgeon: Vane Baez MD;  Location: [facility] OR;  Service: OB/GYN;  Laterality: Right;    DIAGNOSTIC LAPAROSCOPY N/A 5/15/2023    Procedure: LAPAROSCOPY, DIAGNOSTIC;  Surgeon: Israel Bernstein MD;  Location: [facility] OR;  Service: OB/GYN;  Laterality: N/A;    FRACTURE SURGERY      r ankle    JOINT REPLACEMENT      knee    KNEE ARTHROSCOPY Right 2006/2011/2013    KNEE ARTHROSCOPY W/ MENISCAL REPAIR Right     Replacement of meniscus and bone grafts    LAPAROSCOPIC APPENDECTOMY N/A 5/15/2023     Procedure: APPENDECTOMY, LAPAROSCOPIC;  Surgeon: Israel Bernstein MD;  Location: [facility];  Service: OB/GYN;  Laterality: N/A;  performed by Dr. Dias    LAPAROSCOPIC LYSIS OF ADHESIONS N/A 5/15/2023    Procedure: LYSIS, ADHESIONS, LAPAROSCOPIC;  Surgeon: Israel Bernstein MD;  Location: [facility] OR;  Service: OB/GYN;  Laterality: N/A;    LAPAROSCOPIC SALPINGO-OOPHORECTOMY Right 07/31/2018    Procedure: SALPINGO-OOPHORECTOMY, LAPAROSCOPIC;  Surgeon: Vane Baez MD;  Location: [facility] OR;  Service: OB/GYN;  Laterality: Right;    SURGICAL REMOVAL OF ENDOMETRIOMA Bilateral 5/15/2023    Procedure: EXCISION, ENDOMETRIOMA;  Surgeon: Israel Bernstein MD;  Location: [facility];  Service: OB/GYN;  Laterality: Bilateral;    TONSILLECTOMY  1994     Family History   Problem Relation Age of Onset    Diabetes Mother     Ovarian cancer Mother     Fibromyalgia Mother     Heart disease Father     Diabetes Father     Heart attack Father     Hyperlipidemia Father      Social History     Tobacco Use    Smoking status: Never    Smokeless tobacco: Never   Substance Use Topics    Alcohol use: No    Drug use: No     OB History   No obstetric history on file.       BP (!) 136/96   Wt 72.6 kg (160 lb 0.9 oz)   LMP  (Exact Date)   BMI 30.24 kg/m²     ROS:  GENERAL: No fever, chills, fatigability or weight loss.  VULVAR: No pain, no lesions and no itching.  VAGINAL: No relaxation, no itching, no discharge, no abnormal bleeding and no lesions.  ABDOMEN: No abdominal pain. Denies nausea. Denies vomiting. No diarrhea. No constipation  BREAST: Denies pain. No lumps. No discharge.  URINARY: No incontinence, no nocturia, no frequency and no dysuria.  CARDIOVASCULAR: No chest pain. No shortness of breath. No leg cramps.  NEUROLOGICAL: No headaches. No vision changes.    PE:   Abdominal:  incisions well healed.        ICD-10-CM ICD-9-CM    1. Postoperative examination  Z09 V67.00       2. Endometrioma of ovary  N80.129 617.1 norethindrone (AYGESTIN) 5 mg Tab             Follow up in 1 year for WWE.  Will continue lupron x 12 months to help achieve longer term relief.  Will start add back therapy.

## 2023-07-03 ENCOUNTER — INFUSION (OUTPATIENT)
Dept: INFUSION THERAPY | Facility: OTHER | Age: 33
End: 2023-07-03
Attending: FAMILY MEDICINE
Payer: COMMERCIAL

## 2023-07-03 VITALS
TEMPERATURE: 98 F | HEART RATE: 93 BPM | DIASTOLIC BLOOD PRESSURE: 99 MMHG | RESPIRATION RATE: 16 BRPM | SYSTOLIC BLOOD PRESSURE: 159 MMHG | OXYGEN SATURATION: 97 %

## 2023-07-03 DIAGNOSIS — N80.129 ENDOMETRIOMA OF OVARY: Primary | ICD-10-CM

## 2023-07-03 PROCEDURE — 96402 CHEMO HORMON ANTINEOPL SQ/IM: CPT

## 2023-07-03 PROCEDURE — 63600175 PHARM REV CODE 636 W HCPCS: Mod: JZ,JG | Performed by: OBSTETRICS & GYNECOLOGY

## 2023-07-03 RX ADMIN — LEUPROLIDE ACETATE 11.25 MG: KIT at 11:07

## 2023-07-03 NOTE — PLAN OF CARE
Lupron injection complete. Pt tolerated well. VSS. NAD. Pt verbalized understanding of discharge instructions before leaving.

## 2023-07-14 ENCOUNTER — OFFICE VISIT (OUTPATIENT)
Dept: FAMILY MEDICINE | Facility: CLINIC | Age: 33
End: 2023-07-14
Payer: COMMERCIAL

## 2023-07-14 VITALS
OXYGEN SATURATION: 98 % | SYSTOLIC BLOOD PRESSURE: 136 MMHG | HEIGHT: 61 IN | HEART RATE: 113 BPM | WEIGHT: 162.69 LBS | BODY MASS INDEX: 30.71 KG/M2 | DIASTOLIC BLOOD PRESSURE: 98 MMHG

## 2023-07-14 DIAGNOSIS — M32.9 SLE (SYSTEMIC LUPUS ERYTHEMATOSUS RELATED SYNDROME): ICD-10-CM

## 2023-07-14 DIAGNOSIS — N80.129 ENDOMETRIOMA OF OVARY: ICD-10-CM

## 2023-07-14 DIAGNOSIS — E78.2 MIXED HYPERLIPIDEMIA: ICD-10-CM

## 2023-07-14 DIAGNOSIS — G90.A POTS (POSTURAL ORTHOSTATIC TACHYCARDIA SYNDROME): ICD-10-CM

## 2023-07-14 DIAGNOSIS — Z13.1 SCREENING FOR DIABETES MELLITUS: ICD-10-CM

## 2023-07-14 DIAGNOSIS — N80.9 ENDOMETRIOSIS: ICD-10-CM

## 2023-07-14 DIAGNOSIS — Z15.89 MTHFR MUTATION: ICD-10-CM

## 2023-07-14 DIAGNOSIS — Q79.60 EHLERS-DANLOS SYNDROME: ICD-10-CM

## 2023-07-14 DIAGNOSIS — I10 HYPERTENSION, UNSPECIFIED TYPE: Primary | ICD-10-CM

## 2023-07-14 PROCEDURE — 4010F ACE/ARB THERAPY RXD/TAKEN: CPT | Mod: CPTII,S$GLB,, | Performed by: FAMILY MEDICINE

## 2023-07-14 PROCEDURE — 1159F MED LIST DOCD IN RCRD: CPT | Mod: CPTII,S$GLB,, | Performed by: FAMILY MEDICINE

## 2023-07-14 PROCEDURE — 1159F PR MEDICATION LIST DOCUMENTED IN MEDICAL RECORD: ICD-10-PCS | Mod: CPTII,S$GLB,, | Performed by: FAMILY MEDICINE

## 2023-07-14 PROCEDURE — 3075F PR MOST RECENT SYSTOLIC BLOOD PRESS GE 130-139MM HG: ICD-10-PCS | Mod: CPTII,S$GLB,, | Performed by: FAMILY MEDICINE

## 2023-07-14 PROCEDURE — 4010F PR ACE/ARB THEARPY RXD/TAKEN: ICD-10-PCS | Mod: CPTII,S$GLB,, | Performed by: FAMILY MEDICINE

## 2023-07-14 PROCEDURE — 3008F PR BODY MASS INDEX (BMI) DOCUMENTED: ICD-10-PCS | Mod: CPTII,S$GLB,, | Performed by: FAMILY MEDICINE

## 2023-07-14 PROCEDURE — 99999 PR PBB SHADOW E&M-EST. PATIENT-LVL III: ICD-10-PCS | Mod: PBBFAC,,, | Performed by: FAMILY MEDICINE

## 2023-07-14 PROCEDURE — 3008F BODY MASS INDEX DOCD: CPT | Mod: CPTII,S$GLB,, | Performed by: FAMILY MEDICINE

## 2023-07-14 PROCEDURE — 1160F PR REVIEW ALL MEDS BY PRESCRIBER/CLIN PHARMACIST DOCUMENTED: ICD-10-PCS | Mod: CPTII,S$GLB,, | Performed by: FAMILY MEDICINE

## 2023-07-14 PROCEDURE — 3080F DIAST BP >= 90 MM HG: CPT | Mod: CPTII,S$GLB,, | Performed by: FAMILY MEDICINE

## 2023-07-14 PROCEDURE — 3075F SYST BP GE 130 - 139MM HG: CPT | Mod: CPTII,S$GLB,, | Performed by: FAMILY MEDICINE

## 2023-07-14 PROCEDURE — 99214 OFFICE O/P EST MOD 30 MIN: CPT | Mod: S$GLB,,, | Performed by: FAMILY MEDICINE

## 2023-07-14 PROCEDURE — 1160F RVW MEDS BY RX/DR IN RCRD: CPT | Mod: CPTII,S$GLB,, | Performed by: FAMILY MEDICINE

## 2023-07-14 PROCEDURE — 99999 PR PBB SHADOW E&M-EST. PATIENT-LVL III: CPT | Mod: PBBFAC,,, | Performed by: FAMILY MEDICINE

## 2023-07-14 PROCEDURE — 3080F PR MOST RECENT DIASTOLIC BLOOD PRESSURE >= 90 MM HG: ICD-10-PCS | Mod: CPTII,S$GLB,, | Performed by: FAMILY MEDICINE

## 2023-07-14 PROCEDURE — 99214 PR OFFICE/OUTPT VISIT, EST, LEVL IV, 30-39 MIN: ICD-10-PCS | Mod: S$GLB,,, | Performed by: FAMILY MEDICINE

## 2023-07-14 RX ORDER — LABETALOL 100 MG/1
100 TABLET, FILM COATED ORAL 2 TIMES DAILY
Qty: 60 TABLET | Refills: 3 | Status: SHIPPED | OUTPATIENT
Start: 2023-07-14 | End: 2023-10-11

## 2023-07-14 NOTE — PROGRESS NOTES
Subjective:       Patient ID: Justine Akins is a 33 y.o. female.    Chief Complaint: Follow-up (On BP and recent hospital stays )    Here today to follow up on chronic medical conditions and recent hospital visits.    Endometriosis:  s/p removal of ovarian cyst, surgery in May.  She also had her appendix removed at the time.  GYN is keeping her on Lupron x 1 year and then she thinks they may try to get pregnant.    Hypothyroid: Dx about 5 years ago.  She was on Synthroid 50 mcg.  TSH in NOV 2022 off medication was normal.  She has stayed off medication     Fibromyalgia/Conective Tissues Disease/SLE: Followed by Rheum, but last seen in Nov 2019 due to COVID.  She is on Plaquenil.  She is on Norco per pain mgt.     HTN:  She is on Losartan 25 mg daily.  History of GRUBER.  Blood pressure has been elevated    Migraine Headaches:  more intense since starting Lupron.    Hypertension  This is a chronic problem. The current episode started more than 1 year ago. The problem has been gradually worsening since onset. The problem is resistant. Associated symptoms include anxiety, blurred vision, chest pain, headaches, malaise/fatigue, neck pain, orthopnea, PND and sweats. Pertinent negatives include no palpitations, peripheral edema or shortness of breath. Risk factors for coronary artery disease include dyslipidemia, family history and obesity. The current treatment provides moderate improvement. Compliance problems include exercise and medication side effects.    Review of Systems   Constitutional:  Positive for malaise/fatigue. Negative for activity change, appetite change, fatigue and fever.   Eyes:  Positive for blurred vision.   Respiratory:  Negative for cough, shortness of breath and wheezing.    Cardiovascular:  Positive for chest pain, orthopnea and PND. Negative for palpitations.   Gastrointestinal:  Negative for abdominal pain, constipation, diarrhea and nausea.   Musculoskeletal:  Positive for neck pain.  "  Skin:  Negative for pallor and rash.   Neurological:  Positive for headaches. Negative for dizziness, syncope and light-headedness.   Psychiatric/Behavioral:  The patient is not nervous/anxious.      Objective:      Vitals:    07/14/23 1509   BP: (!) 136/98   Pulse: (!) 113   SpO2: 98%   Weight: 73.8 kg (162 lb 11.2 oz)   Height: 5' 1" (1.549 m)      Physical Exam  Constitutional:       General: She is not in acute distress.  Cardiovascular:      Rate and Rhythm: Normal rate and regular rhythm.      Heart sounds: Normal heart sounds. No murmur heard.  Pulmonary:      Effort: Pulmonary effort is normal. No respiratory distress.      Breath sounds: Normal breath sounds. No wheezing, rhonchi or rales.   Skin:     General: Skin is warm and dry.   Neurological:      General: No focal deficit present.      Mental Status: She is alert.   Psychiatric:         Mood and Affect: Mood normal.         Behavior: Behavior normal.         Thought Content: Thought content normal.          Assessment:       1. Hypertension, unspecified type    2. POTS (postural orthostatic tachycardia syndrome)    3. Mixed hyperlipidemia    4. Endometriosis    5. Endometrioma of ovary    6. Jesse-Danlos syndrome    7. SLE (systemic lupus erythematosus related syndrome)    8. MTHFR mutation        Plan:       Hypertension, unspecified type  -     labetaloL (NORMODYNE) 100 MG tablet; Take 1 tablet (100 mg total) by mouth 2 (two) times daily.  Dispense: 60 tablet; Refill: 3  She would consider trying to get pregnant in the next few years.  Her BP is not controlled on Losartan 25 mg.  Change today to Labetalol.   Has a history of mild asthma, will need to be careful with beta blocker  POTS (postural orthostatic tachycardia syndrome)  Change Losartan to Beta blocker and will need to watch  Mixed hyperlipidemia  Lipids elevated in NOV.  Will need to recheck in the next 6-12 months  Endometriosis/ Endometrioma of ovary  S/p surgery and now on Lupron per " GYN  Jesse-Danlos syndrome /SLE (systemic lupus erythematosus related syndrome)  Followed by Rheum and pain mgt.  MTHFR mutation  On ASA        Medication List with Changes/Refills   New Medications    LABETALOL (NORMODYNE) 100 MG TABLET    Take 1 tablet (100 mg total) by mouth 2 (two) times daily.   Current Medications    ALBUTEROL-IPRATROPIUM (DUO-NEB) 2.5 MG-0.5 MG/3 ML NEBULIZER SOLUTION    Take 3 mLs by nebulization every 6 (six) hours as needed for Wheezing. Rescue    ASPIRIN (ECOTRIN) 81 MG EC TABLET    Take 81 mg by mouth once daily.    BUDESONIDE (PULMICORT) 0.5 MG/2 ML NEBULIZER SOLUTION    Take 2 mLs (0.5 mg total) by nebulization once daily. Controller    BUTALBITAL-ACETAMINOPHEN-CAFF -40 MG CAP    Take 1 capsule by mouth.    CHOLECALCIFEROL, VITAMIN D3, 5,000 UNIT TAB    Take 5,000 Units by mouth once daily.     ENOXAPARIN (LOVENOX) 40 MG/0.4 ML SYRG    Inject 0.4 mLs (40 mg total) into the skin once daily.    FISH OIL-OMEGA-3 FATTY ACIDS 300-1,000 MG CAPSULE    Take by mouth once daily.    HYDROCODONE-ACETAMINOPHEN (NORCO) 5-325 MG PER TABLET    Take 1 tablet by mouth every 12 (twelve) hours as needed.    HYDROXYCHLOROQUINE (PLAQUENIL) 200 MG TABLET    Take 1 tablet (200 mg total) by mouth once daily.    MAGNESIUM OXIDE (MAG-OX) 400 MG (241.3 MG MAGNESIUM) TABLET    Take 400 mg by mouth once daily.    NORETHINDRONE (AYGESTIN) 5 MG TAB    Take 1 tablet (5 mg total) by mouth once daily. Start with 1/2 tab daily and can increase to full tab if tolerated    ONDANSETRON (ZOFRAN-ODT) 4 MG TBDL    Take 1 tablet (4 mg total) by mouth every 6 (six) hours as needed (nausea).   Discontinued Medications    DOCUSATE SODIUM (COLACE) 100 MG CAPSULE    Take 1 capsule (100 mg total) by mouth 2 (two) times daily as needed for Constipation.    IBUPROFEN (ADVIL,MOTRIN) 600 MG TABLET    Take 1 tablet (600 mg total) by mouth every 6 (six) hours as needed for Pain.    LOSARTAN (COZAAR) 25 MG TABLET    Take 1 tablet  (25 mg total) by mouth once daily.    ONDANSETRON (ZOFRAN) 4 MG TABLET    Take 1 tablet (4 mg total) by mouth every 8 (eight) hours as needed for Nausea.    OXYCODONE (ROXICODONE) 10 MG TAB IMMEDIATE RELEASE TABLET    Take 1 tablet (10 mg total) by mouth every 6 (six) hours as needed for Pain.    OXYCODONE-ACETAMINOPHEN (PERCOCET) 5-325 MG PER TABLET    Take 1 tablet by mouth every 6 (six) hours as needed for Pain.         Septic shock

## 2023-08-28 ENCOUNTER — TELEPHONE (OUTPATIENT)
Dept: OBSTETRICS AND GYNECOLOGY | Facility: CLINIC | Age: 33
End: 2023-08-28

## 2023-10-06 ENCOUNTER — PATIENT MESSAGE (OUTPATIENT)
Dept: OBSTETRICS AND GYNECOLOGY | Facility: CLINIC | Age: 33
End: 2023-10-06
Payer: COMMERCIAL

## 2023-10-06 ENCOUNTER — TELEPHONE (OUTPATIENT)
Dept: OBSTETRICS AND GYNECOLOGY | Facility: CLINIC | Age: 33
End: 2023-10-06
Payer: COMMERCIAL

## 2023-10-11 DIAGNOSIS — I10 HYPERTENSION, UNSPECIFIED TYPE: ICD-10-CM

## 2023-10-11 RX ORDER — LABETALOL 100 MG/1
100 TABLET, FILM COATED ORAL 2 TIMES DAILY
Qty: 180 TABLET | Refills: 3 | Status: SHIPPED | OUTPATIENT
Start: 2023-10-11

## 2023-10-11 NOTE — TELEPHONE ENCOUNTER
No care due was identified.  [facility] Embedded Care Due Messages. Reference number: [id number].   10/11/2023 12:10:35 AM CDT

## 2023-10-11 NOTE — TELEPHONE ENCOUNTER
Refill Routing Note   Medication(s) are not appropriate for processing by [facility] for the following reason(s):      Required vitals abnormal  New or recently adjusted medication    [facility] action(s):  Defer Care Due:  None identified          Appointments  past 12m or future 3m with PCP    Date Provider   Last Visit   7/14/2023 Reji Faith MD   Next Visit   Visit date not found Reji Faith MD   ED visits in past 90 days: 0        Note composed:3:51 AM 10/11/2023

## 2023-10-31 ENCOUNTER — TELEPHONE (OUTPATIENT)
Dept: INFUSION THERAPY | Facility: OTHER | Age: 33
End: 2023-10-31
Payer: COMMERCIAL

## 2023-11-07 ENCOUNTER — TELEPHONE (OUTPATIENT)
Dept: INFUSION THERAPY | Facility: OTHER | Age: 33
End: 2023-11-07
Payer: COMMERCIAL

## 2023-11-14 ENCOUNTER — TELEPHONE (OUTPATIENT)
Dept: INFUSION THERAPY | Facility: OTHER | Age: 33
End: 2023-11-14
Payer: COMMERCIAL

## 2023-12-11 RX ORDER — HYDROXYCHLOROQUINE SULFATE 200 MG/1
200 TABLET, FILM COATED ORAL
Qty: 90 TABLET | Refills: 2 | Status: SHIPPED | OUTPATIENT
Start: 2023-12-11

## 2023-12-12 ENCOUNTER — PATIENT MESSAGE (OUTPATIENT)
Dept: ADMINISTRATIVE | Facility: HOSPITAL | Age: 33
End: 2023-12-12
Payer: COMMERCIAL

## 2024-01-07 DIAGNOSIS — K29.70 GASTRITIS WITHOUT BLEEDING, UNSPECIFIED CHRONICITY, UNSPECIFIED GASTRITIS TYPE: ICD-10-CM

## 2024-01-08 RX ORDER — ONDANSETRON 4 MG/1
4 TABLET, ORALLY DISINTEGRATING ORAL EVERY 6 HOURS PRN
Qty: 30 TABLET | Refills: 1 | Status: SHIPPED | OUTPATIENT
Start: 2024-01-08

## 2024-04-04 ENCOUNTER — PATIENT MESSAGE (OUTPATIENT)
Dept: ADMINISTRATIVE | Facility: HOSPITAL | Age: 34
End: 2024-04-04
Payer: COMMERCIAL

## 2024-06-10 ENCOUNTER — PATIENT MESSAGE (OUTPATIENT)
Dept: ADMINISTRATIVE | Facility: HOSPITAL | Age: 34
End: 2024-06-10
Payer: COMMERCIAL

## 2024-07-29 ENCOUNTER — TELEPHONE (OUTPATIENT)
Dept: OBSTETRICS AND GYNECOLOGY | Facility: CLINIC | Age: 34
End: 2024-07-29
Payer: COMMERCIAL

## 2024-07-29 NOTE — TELEPHONE ENCOUNTER
----- Message from Dann Padilla sent at 7/29/2024 11:47 AM CDT -----  Regarding: appt  Name of Who is Calling:Pt         What is the request in detail: Requesting sooner appt in regards to Endometrioma of ovary  Please advise           Can the clinic reply by [facility]: yes         What Number [facility] Call Back [facility]:Telephone Information:  [employer]          [phone]

## 2024-08-05 ENCOUNTER — TELEPHONE (OUTPATIENT)
Dept: OBSTETRICS AND GYNECOLOGY | Facility: CLINIC | Age: 34
End: 2024-08-05
Payer: COMMERCIAL

## 2024-08-21 ENCOUNTER — PATIENT MESSAGE (OUTPATIENT)
Dept: OBSTETRICS AND GYNECOLOGY | Facility: CLINIC | Age: 34
End: 2024-08-21
Payer: COMMERCIAL

## 2024-08-21 DIAGNOSIS — N80.129 ENDOMETRIOMA OF OVARY: ICD-10-CM

## 2024-08-21 DIAGNOSIS — N80.9 ENDOMETRIOSIS: Primary | ICD-10-CM

## 2024-08-22 DIAGNOSIS — N80.129 ENDOMETRIOMA OF OVARY: ICD-10-CM

## 2024-08-22 DIAGNOSIS — N80.9 ENDOMETRIOSIS: ICD-10-CM

## 2024-08-22 RX ORDER — KETOROLAC TROMETHAMINE 10 MG/1
10 TABLET, FILM COATED ORAL EVERY 6 HOURS
Qty: 20 TABLET | Refills: 1 | Status: SHIPPED | OUTPATIENT
Start: 2024-08-22 | End: 2024-08-22 | Stop reason: SDUPTHER

## 2024-08-23 RX ORDER — KETOROLAC TROMETHAMINE 10 MG/1
10 TABLET, FILM COATED ORAL EVERY 6 HOURS
Qty: 20 TABLET | Refills: 1 | Status: SHIPPED | OUTPATIENT
Start: 2024-08-23 | End: 2024-08-28

## 2024-09-23 ENCOUNTER — OFFICE VISIT (OUTPATIENT)
Dept: OBSTETRICS AND GYNECOLOGY | Facility: CLINIC | Age: 34
End: 2024-09-23
Payer: COMMERCIAL

## 2024-09-23 VITALS
HEIGHT: 61 IN | SYSTOLIC BLOOD PRESSURE: 130 MMHG | WEIGHT: 175.25 LBS | DIASTOLIC BLOOD PRESSURE: 88 MMHG | BODY MASS INDEX: 33.09 KG/M2

## 2024-09-23 DIAGNOSIS — N80.129 ENDOMETRIOMA OF OVARY: Primary | ICD-10-CM

## 2024-09-23 DIAGNOSIS — Z31.69 PRE-CONCEPTION COUNSELING: ICD-10-CM

## 2024-09-23 PROCEDURE — 1159F MED LIST DOCD IN RCRD: CPT | Mod: CPTII,S$GLB,, | Performed by: OBSTETRICS & GYNECOLOGY

## 2024-09-23 PROCEDURE — 3075F SYST BP GE 130 - 139MM HG: CPT | Mod: CPTII,S$GLB,, | Performed by: OBSTETRICS & GYNECOLOGY

## 2024-09-23 PROCEDURE — 3008F BODY MASS INDEX DOCD: CPT | Mod: CPTII,S$GLB,, | Performed by: OBSTETRICS & GYNECOLOGY

## 2024-09-23 PROCEDURE — 99999 PR PBB SHADOW E&M-EST. PATIENT-LVL III: CPT | Mod: PBBFAC,,, | Performed by: OBSTETRICS & GYNECOLOGY

## 2024-09-23 PROCEDURE — 1160F RVW MEDS BY RX/DR IN RCRD: CPT | Mod: CPTII,S$GLB,, | Performed by: OBSTETRICS & GYNECOLOGY

## 2024-09-23 PROCEDURE — 3079F DIAST BP 80-89 MM HG: CPT | Mod: CPTII,S$GLB,, | Performed by: OBSTETRICS & GYNECOLOGY

## 2024-09-23 PROCEDURE — G2211 COMPLEX E/M VISIT ADD ON: HCPCS | Mod: S$GLB,,, | Performed by: OBSTETRICS & GYNECOLOGY

## 2024-09-23 PROCEDURE — 99214 OFFICE O/P EST MOD 30 MIN: CPT | Mod: S$GLB,,, | Performed by: OBSTETRICS & GYNECOLOGY

## 2024-09-23 RX ORDER — NORETHINDRONE 5 MG/1
5 TABLET ORAL DAILY
Qty: 30 TABLET | Refills: 11 | Status: SHIPPED | OUTPATIENT
Start: 2024-09-23 | End: 2024-10-23

## 2024-09-23 NOTE — PATIENT INSTRUCTIONS
Ulises Baca III, M.D. Ph.D.  [facility]  [address] [place] [address]  [phone]    [email or web address]

## 2024-09-23 NOTE — PROGRESS NOTES
Subjective     Patient ID: Justine Akins is a 34 y.o. female.    Chief Complaint:  No chief complaint on file.      History of Present Illness  HPI  Pt is 34 y.o. here with her partner to talk about her recurrent pain and endometrioma.  Reviewed results of last surgery (5/23) where 10cm endometrioma was removed from right side.  Pt with stage 4 endo and severe adhesive dz.  Pt s/p 2 doses lupron, last 7/23.    States that after stopping her Lupron, it took a little bit of time before she started having cycles.  She then used ovulation predictor kits which turned positive but if since stopped.  Patient and her partner would like to get pregnant but they do not want to go through in vitro fertilization given the difficult experience her sister-in-law is having.  Patient has been having return of pain on the right side with a recent CT scan that found the following:    Comparison: 4/13/2023     Postcontrast images were obtained from lung bases to the iliac crests. This was followed by postcontrast images through the pelvis. Oral contrast was not given. IOPAMIDOL 370 MG IODINE/ML (76 %) INTRAVENOUS SOLUTION:100mL     All CT scans performed at this facility utilize dose modulation techniques as appropriate to a performed exam including the following: automated exposure control; adjustment of the mA and/or kV according to the patient's size (this includes techniques or standardized protocols for targeted exams where dose is matched to indication/reason for exam; i.e. extremities or head); use of iterative reconstruction technique. .     The lung bases are unremarkable. There is homogeneous attenuation of the liver and spleen. The gallbladder is unremarkable. There is no intra or extrahepatic biliary dilatation. Several small splenules are noted. The pancreas is unremarkable in appearance without ductal dilatation.     The stomach is not distended. There is no bowel dilatation or obstructive process. There is no  free air or free fluid in the abdomen. Anterior abdominal wall is intact.     The aorta is of normal caliber. No retroperitoneal adenopathy. Adrenal glands show no significant abnormality. The kidneys and proximal ureters show no significant abnormality.     Imaging through the pelvis imaging through the pelvis demonstrates unremarkable appearance to the urinary bladder. There is a persistent rim-enhancing cystic structure in the right adnexa that measures 55 x 32 mm. This is decreased in size when compared to prior exam. A smaller complex cyst noted in the left adnexa measuring 22 mm. There is no pelvic free fluid. Uterus is otherwise unremarkable in the anteflexed position. Lower quadrants of the abdomen and pelvis are unremarkable.         GYN & OB History  Patient's last menstrual period was 2024.   Date of Last Pap: 12/3/2019    OB History    Para Term  AB Living   0 0 0 0 0 0   SAB IAB Ectopic Multiple Live Births   0 0 0 0 0       Review of Systems  Review of Systems   Constitutional:  Negative for activity change, appetite change and fatigue.   HENT: Negative.  Negative for tinnitus.    Eyes: Negative.    Respiratory:  Negative for cough and shortness of breath.    Cardiovascular:  Negative for chest pain and palpitations.   Gastrointestinal: Negative.  Negative for abdominal pain, blood in stool, constipation, diarrhea and nausea.   Endocrine: Negative.  Negative for hot flashes.   Genitourinary:  Positive for dysmenorrhea and pelvic pain. Negative for dyspareunia, dysuria, frequency, menstrual problem, vaginal discharge, urinary incontinence and postcoital bleeding.   Musculoskeletal:  Negative for back pain and joint swelling.   Integumentary:  Negative for rash, breast mass, nipple discharge and breast skin changes.   Neurological: Negative.  Negative for headaches.   Hematological: Negative.  Does not bruise/bleed easily.   Psychiatric/Behavioral:  The patient is not nervous/anxious.     Breast: negative.  Negative for mass, nipple discharge and skin changes         Objective   Physical Exam    def     Assessment and Plan     1. Endometrioma of ovary    2. Pre-conception counseling             Plan:  Diagnoses and all orders for this visit:    Endometrioma of ovary  -     norethindrone (AYGESTIN) 5 mg Tab; Take 1 tablet (5 mg total) by mouth once daily.  I had a long discussion with the patient and her partner lasting approximately 45 minutes about the different treatment options for her symptoms.  We also had a conversation about trying to balance her desires for future fertility in the face of a possible recurrence of endometrioma.  Patient has not sought consultation from Reproductive Endocrinology and I told the patient they may benefit from hearing about the different treatment options so they can make an informed decision.  Patient is aware that I am concerned that she may end up needing removal of the right ovary which would potentially make it impossible to conceive.  Her case is also complicated given her history of a pulmonary embolism and the role of hormone replacement therapy if she does have removal of both of her ovaries for endometriosis.    Patient was given the the information for Dr. Baca and she will seek consultation with them.  If she decides she wants to seek surgical management from our team, I would recommend referring her to Dr. Cho as the robotic approach may be the best option at preserving her ovary.  All questions were answered.  Pre-conception counseling

## 2024-10-16 ENCOUNTER — OFFICE VISIT (OUTPATIENT)
Dept: OPTOMETRY | Facility: CLINIC | Age: 34
End: 2024-10-16
Payer: COMMERCIAL

## 2024-10-16 DIAGNOSIS — H52.203 MYOPIA WITH ASTIGMATISM AND PRESBYOPIA, BILATERAL: Primary | ICD-10-CM

## 2024-10-16 DIAGNOSIS — M32.9 SLE (SYSTEMIC LUPUS ERYTHEMATOSUS RELATED SYNDROME): ICD-10-CM

## 2024-10-16 DIAGNOSIS — Z79.899 LONG-TERM USE OF PLAQUENIL: ICD-10-CM

## 2024-10-16 DIAGNOSIS — H52.13 MYOPIA WITH ASTIGMATISM AND PRESBYOPIA, BILATERAL: Primary | ICD-10-CM

## 2024-10-16 DIAGNOSIS — H43.393 VITREOUS FLOATERS OF BOTH EYES: ICD-10-CM

## 2024-10-16 DIAGNOSIS — H52.4 MYOPIA WITH ASTIGMATISM AND PRESBYOPIA, BILATERAL: Primary | ICD-10-CM

## 2024-10-16 PROCEDURE — 99999 PR PBB SHADOW E&M-EST. PATIENT-LVL III: CPT | Mod: PBBFAC,,,

## 2024-10-16 PROCEDURE — 92004 COMPRE OPH EXAM NEW PT 1/>: CPT | Mod: S$GLB,,,

## 2024-10-16 PROCEDURE — 92015 DETERMINE REFRACTIVE STATE: CPT | Mod: S$GLB,,,

## 2024-10-16 NOTE — PROGRESS NOTES
"HPI    Pt here for ocular health exam. IAN - 1 yr    Pt states has black spots in VA. More so in OD than OS. Pt describes it as   having circular spots that are black that disrupt her vision. Pt also has   headaches that come and go and cause pt to see "different colors" in the   the black spots that she sees. No gtts currently. Has FOL with migraines,   sees "flashing green triangles", thinks the black spots are floaters.   Photosensitive. Pressure behind the eye pain.  HTN controlled with aid.   Last edited by Donaldo Raines, OD on 10/16/2024 10:33 AM.            Assessment /Plan     For exam results, see Encounter Report.    Myopia with astigmatism and presbyopia, bilateral    SLE (systemic lupus erythematosus related syndrome)  -     Lees Visual Field - OU - Extended - Both Eyes; Future; Expected date: 04/16/2025    Long-term use of Plaquenil  -     Lees Visual Field - OU - Extended - Both Eyes; Future; Expected date: 04/16/2025    Vitreous floaters of both eyes      Discussed spectacle options with pt and released final spec rx. Ed pt on change in rx and adaptation.    2-3. Pt on 200mg Plaquenil daily for Lupus, ~2.52mg/kg/day. Baseline Mac OCT and fundus exam with no signs of bull's eye maculopathy. Ed pt on the potential ocular side effects associated with long-term Plaquenil use. Pt to return for baseline 10-2 HVF. Ed pt if any changes in vision noticed to call or message asap. Continue to monitor yearly with repeat testing.     4. Pt noticing dark spots in vision, particularly OD. Pt states they are different from floaters and do not move around. Pt feels it's almost areas of her vision that are "missing." All ocular health WNL with no macular pathology OD, OS. Ed pt that 10-2 HVF may  on defects she's experiencing. If HVF is WNL, will refer to retina for further evaluation. Reviewed retinal detachment precautions with pt and ed pt to RTC asap if experienced.    RTC: 1 year for " comprehensive exam or sooner prn

## 2024-10-16 NOTE — PROGRESS NOTES
HPI    Pt. Here for ocular health exam. IAN -     Pt. States       Last edited by Alireza Byrne MA on 10/16/2024  8:51 AM.            Assessment /Plan     For exam results, see Encounter Report.    There are no diagnoses linked to this encounter.

## 2024-11-05 ENCOUNTER — PATIENT MESSAGE (OUTPATIENT)
Dept: OPTOMETRY | Facility: CLINIC | Age: 34
End: 2024-11-05
Payer: COMMERCIAL

## 2024-11-05 ENCOUNTER — CLINICAL SUPPORT (OUTPATIENT)
Dept: OPHTHALMOLOGY | Facility: CLINIC | Age: 34
End: 2024-11-05
Payer: COMMERCIAL

## 2024-11-05 ENCOUNTER — TELEPHONE (OUTPATIENT)
Dept: OPTOMETRY | Facility: CLINIC | Age: 34
End: 2024-11-05
Payer: COMMERCIAL

## 2024-11-05 DIAGNOSIS — M32.9 SLE (SYSTEMIC LUPUS ERYTHEMATOSUS RELATED SYNDROME): ICD-10-CM

## 2024-11-05 DIAGNOSIS — Z79.899 LONG-TERM USE OF PLAQUENIL: ICD-10-CM

## 2024-11-05 PROCEDURE — 99499 UNLISTED E&M SERVICE: CPT | Mod: S$GLB,,,

## 2024-11-05 PROCEDURE — 92083 EXTENDED VISUAL FIELD XM: CPT | Mod: S$GLB,,,

## 2024-11-05 NOTE — PROGRESS NOTES
Assessment /Plan     For exam results, see Encounter Report.    SLE (systemic lupus erythematosus related syndrome)  -     Lees Visual Field - OU - Extended - Both Eyes    Long-term use of Plaquenil  -     Lees Visual Field - OU - Extended - Both Eyes      1-2. See exam notes same day for results.

## 2024-11-14 ENCOUNTER — TELEPHONE (OUTPATIENT)
Dept: OPHTHALMOLOGY | Facility: CLINIC | Age: 34
End: 2024-11-14
Payer: COMMERCIAL

## 2024-11-14 ENCOUNTER — OFFICE VISIT (OUTPATIENT)
Dept: OPTOMETRY | Facility: CLINIC | Age: 34
End: 2024-11-14
Payer: COMMERCIAL

## 2024-11-14 DIAGNOSIS — H52.4 MYOPIA WITH ASTIGMATISM AND PRESBYOPIA, BILATERAL: Primary | ICD-10-CM

## 2024-11-14 DIAGNOSIS — H52.203 MYOPIA WITH ASTIGMATISM AND PRESBYOPIA, BILATERAL: Primary | ICD-10-CM

## 2024-11-14 DIAGNOSIS — H52.13 MYOPIA WITH ASTIGMATISM AND PRESBYOPIA, BILATERAL: Primary | ICD-10-CM

## 2024-11-14 PROCEDURE — 99999 PR PBB SHADOW E&M-EST. PATIENT-LVL II: CPT | Mod: PBBFAC,,,

## 2024-11-14 PROCEDURE — 99499 UNLISTED E&M SERVICE: CPT | Mod: S$GLB,,,

## 2024-11-14 NOTE — PROGRESS NOTES
HPI    Pt here for glasses check    Pt sts DVA very blurry.   Last edited by Shirley Malave on 11/14/2024 11:02 AM.            Assessment /Plan     For exam results, see Encounter Report.    Myopia with astigmatism and presbyopia, bilateral      Rx check only. Full exam 10/16/24. New specs made correctly, but pt noticing blur. New refraction done in-office today. Demonstrated new rx to pt compared to previous and compared to original specs pt came in wearing and pt consistently preferred new rx. Released updated spec rx to reflect changes. Ed pt to RTC if issues persist.    RTC: as scheduled for repeat Mac OCT and 10-2 HVF, sooner prn

## 2024-12-27 ENCOUNTER — PATIENT MESSAGE (OUTPATIENT)
Dept: OPTOMETRY | Facility: CLINIC | Age: 34
End: 2024-12-27
Payer: COMMERCIAL

## 2025-01-15 ENCOUNTER — OFFICE VISIT (OUTPATIENT)
Dept: FAMILY MEDICINE | Facility: CLINIC | Age: 35
End: 2025-01-15
Payer: COMMERCIAL

## 2025-01-15 VITALS
SYSTOLIC BLOOD PRESSURE: 136 MMHG | HEART RATE: 83 BPM | HEIGHT: 61 IN | OXYGEN SATURATION: 98 % | BODY MASS INDEX: 31.8 KG/M2 | DIASTOLIC BLOOD PRESSURE: 86 MMHG | TEMPERATURE: 98 F | WEIGHT: 168.44 LBS

## 2025-01-15 DIAGNOSIS — M79.7 FIBROMYALGIA: ICD-10-CM

## 2025-01-15 DIAGNOSIS — E03.9 HYPOTHYROIDISM, UNSPECIFIED TYPE: ICD-10-CM

## 2025-01-15 DIAGNOSIS — R07.9 CHEST PAIN, UNSPECIFIED TYPE: Primary | ICD-10-CM

## 2025-01-15 DIAGNOSIS — N80.9 ENDOMETRIOSIS: ICD-10-CM

## 2025-01-15 DIAGNOSIS — J45.20 ASTHMA, INTERMITTENT, UNCOMPLICATED: ICD-10-CM

## 2025-01-15 DIAGNOSIS — Z15.89 MTHFR MUTATION: ICD-10-CM

## 2025-01-15 DIAGNOSIS — G90.A POTS (POSTURAL ORTHOSTATIC TACHYCARDIA SYNDROME): ICD-10-CM

## 2025-01-15 DIAGNOSIS — M32.9 SLE (SYSTEMIC LUPUS ERYTHEMATOSUS RELATED SYNDROME): ICD-10-CM

## 2025-01-15 DIAGNOSIS — E78.00 HYPERCHOLESTEREMIA: ICD-10-CM

## 2025-01-15 PROBLEM — Z80.0 FAMILY HISTORY OF COLON CANCER: Status: ACTIVE | Noted: 2024-03-11

## 2025-01-15 PROBLEM — G43.709 CHRONIC MIGRAINE WITHOUT AURA WITHOUT STATUS MIGRAINOSUS, NOT INTRACTABLE: Status: ACTIVE | Noted: 2024-03-11

## 2025-01-15 PROBLEM — Z80.41 FAMILY HISTORY OF OVARIAN CANCER: Status: ACTIVE | Noted: 2024-03-11

## 2025-01-15 LAB
OHS QRS DURATION: 82 MS
OHS QTC CALCULATION: 420 MS

## 2025-01-15 PROCEDURE — 3079F DIAST BP 80-89 MM HG: CPT | Mod: CPTII,S$GLB,, | Performed by: FAMILY MEDICINE

## 2025-01-15 PROCEDURE — 1160F RVW MEDS BY RX/DR IN RCRD: CPT | Mod: CPTII,S$GLB,, | Performed by: FAMILY MEDICINE

## 2025-01-15 PROCEDURE — 99999 PR PBB SHADOW E&M-EST. PATIENT-LVL IV: CPT | Mod: PBBFAC,,, | Performed by: FAMILY MEDICINE

## 2025-01-15 PROCEDURE — 3008F BODY MASS INDEX DOCD: CPT | Mod: CPTII,S$GLB,, | Performed by: FAMILY MEDICINE

## 2025-01-15 PROCEDURE — 93005 ELECTROCARDIOGRAM TRACING: CPT | Mod: S$GLB,,, | Performed by: FAMILY MEDICINE

## 2025-01-15 PROCEDURE — 1159F MED LIST DOCD IN RCRD: CPT | Mod: CPTII,S$GLB,, | Performed by: FAMILY MEDICINE

## 2025-01-15 PROCEDURE — 3075F SYST BP GE 130 - 139MM HG: CPT | Mod: CPTII,S$GLB,, | Performed by: FAMILY MEDICINE

## 2025-01-15 PROCEDURE — 99215 OFFICE O/P EST HI 40 MIN: CPT | Mod: S$GLB,,, | Performed by: FAMILY MEDICINE

## 2025-01-15 PROCEDURE — 93010 ELECTROCARDIOGRAM REPORT: CPT | Mod: S$GLB,,, | Performed by: INTERNAL MEDICINE

## 2025-01-15 PROCEDURE — G2211 COMPLEX E/M VISIT ADD ON: HCPCS | Mod: S$GLB,,, | Performed by: FAMILY MEDICINE

## 2025-01-15 RX ORDER — DILTIAZEM HYDROCHLORIDE 30 MG/1
1 TABLET, FILM COATED ORAL 4 TIMES DAILY
COMMUNITY
End: 2025-01-15

## 2025-01-15 RX ORDER — ROSUVASTATIN CALCIUM 20 MG/1
20 TABLET, COATED ORAL
COMMUNITY

## 2025-01-15 RX ORDER — LEVOTHYROXINE SODIUM 50 UG/1
1 TABLET ORAL EVERY MORNING
COMMUNITY
End: 2025-01-15

## 2025-01-15 RX ORDER — PANTOPRAZOLE SODIUM 40 MG/1
40 TABLET, DELAYED RELEASE ORAL DAILY
Qty: 30 TABLET | Refills: 3 | Status: SHIPPED | OUTPATIENT
Start: 2025-01-15 | End: 2026-01-15

## 2025-01-15 RX ORDER — TRAMADOL HYDROCHLORIDE 50 MG/1
50 TABLET ORAL EVERY 6 HOURS PRN
COMMUNITY
End: 2025-01-15

## 2025-01-15 NOTE — PROGRESS NOTES
Subjective:       Patient ID: Justine Akins is a 34 y.o. female.    Chief Complaint: Chest Pain (Started 2 days ago), Nausea, and Fatigue     Here today for an acute visit. She was 11 min late to her 20 min appt and was worked back into the schedule.       She is here today complaining of chest pain.  Pain is intermittent and started about 2 days ago.  No obvious cause.  Pain radiates through back.  Pain 5/10 achy.  No SOB, pain with deep breathing.  No trauma.  + nausea.  No vomiting.  Normal appetite.  Took ASA.      Endometriosis:  s/p removal of ovarian cyst.  Now off Lupron.  Seeing GYN.  Thinks she needs a hyst.     Hypothyroid: Dx years ago.  She was on Synthroid 50 mcg.  TSH in NOV 2022 off medication was normal.  She has stayed off medication     Fibromyalgia/Conective Tissues Disease/SLE: Followed by Rheum. She is off Plaquenil.  She is on Norco per pain mgt.     HTN:  She is on Labetalol daily.  History of POTS.       Migraine Headaches:  has Fioricet for PRN use        Review of Systems   Constitutional:  Negative for activity change and unexpected weight change.   HENT:  Positive for trouble swallowing. Negative for hearing loss and rhinorrhea.    Eyes:  Positive for visual disturbance. Negative for discharge.   Respiratory:  Positive for chest tightness. Negative for wheezing.    Cardiovascular:  Positive for chest pain. Negative for palpitations.   Gastrointestinal:  Positive for constipation and diarrhea. Negative for blood in stool and vomiting.   Endocrine: Positive for polyuria. Negative for polydipsia.   Genitourinary:  Positive for dysuria and menstrual problem. Negative for difficulty urinating and hematuria.   Musculoskeletal:  Positive for arthralgias, joint swelling and neck pain.   Neurological:  Positive for weakness and headaches.   Psychiatric/Behavioral:  Negative for confusion and dysphoric mood.        Objective:      Vitals:    01/15/25 0920   BP: 136/86   BP Location: Left  "arm   Patient Position: Sitting   Pulse: 83   Temp: 97.7 °F (36.5 °C)   TempSrc: Oral   SpO2: 98%   Weight: 76.4 kg (168 lb 6.9 oz)   Height: 5' 1" (1.549 m)      Physical Exam  Constitutional:       General: She is not in acute distress.  Cardiovascular:      Rate and Rhythm: Normal rate and regular rhythm.      Heart sounds: Normal heart sounds. No murmur heard.  Pulmonary:      Effort: Pulmonary effort is normal. No respiratory distress.      Breath sounds: Normal breath sounds. No wheezing, rhonchi or rales.   Abdominal:      General: Abdomen is flat. There is no distension.      Palpations: There is no mass.      Tenderness: There is abdominal tenderness (diffuse). There is no right CVA tenderness, left CVA tenderness or guarding.   Neurological:      General: No focal deficit present.      Mental Status: She is alert.   Psychiatric:         Mood and Affect: Mood normal.         Behavior: Behavior normal.         Thought Content: Thought content normal.       EKG:  NSR     Assessment:       1. Chest pain, unspecified type    2. Asthma, intermittent, uncomplicated    3. MTHFR mutation    4. POTS (postural orthostatic tachycardia syndrome)    5. SLE (systemic lupus erythematosus related syndrome)    6. Fibromyalgia    7. Hypothyroidism, unspecified type    8. Hypercholesteremia    9. Endometriosis        Plan:       Chest pain, unspecified type  -     IN OFFICE EKG 12-LEAD (to Muse)  -     X-Ray Chest PA And Lateral; Future; Expected date: 01/15/2025  -     CBC Auto Differential; Future; Expected date: 01/15/2025  -     Comprehensive Metabolic Panel; Future; Expected date: 01/15/2025  -     TSH; Future; Expected date: 01/15/2025  -     TROPONIN I; Future; Expected date: 01/15/2025  -     D-DIMER, QUANTITATIVE; Future; Expected date: 01/15/2025  -     pantoprazole (PROTONIX) 40 MG tablet; Take 1 tablet (40 mg total) by mouth once daily.  Dispense: 30 tablet; Refill: 3  Lab work, CXR and EKG today.  Start " PPI.    Asthma, intermittent, uncomplicated  Check CXR.  Stable.   MTHFR mutation  -     D-DIMER, QUANTITATIVE; Future; Expected date: 01/15/2025  History of clot in past.  Was on Lovenox when she was on Lupron.  Off all anticoagulants at this time.  POTS (postural orthostatic tachycardia syndrome)  Stable.  Continue Beta blocker  SLE (systemic lupus erythematosus related syndrome)  Mgt per rheum.  Off daily therapy at this time  Fibromyalgia    Hypothyroidism, unspecified type  -     TSH; Future; Expected date: 01/15/2025  Off medication.  Last TSH was > 1 year ago.  Recheck at this time.  Hypercholesteremia  Continue Crestor  Endometriosis  Continues to be a significant issue.  Will continue to f/u with GYN and most likely will be having further surgery.      Visit today included increased complexity associated with the care of the episodic problem Hypothyroidism addressed and managing the longitudinal care of the patient due to the serious and/or complex managed problem(s) as above.       Medication List with Changes/Refills   New Medications    PANTOPRAZOLE (PROTONIX) 40 MG TABLET    Take 1 tablet (40 mg total) by mouth once daily.   Current Medications    ALBUTEROL-IPRATROPIUM (DUO-NEB) 2.5 MG-0.5 MG/3 ML NEBULIZER SOLUTION    Take 3 mLs by nebulization every 6 (six) hours as needed for Wheezing. Rescue    ASPIRIN (ECOTRIN) 81 MG EC TABLET    Take 81 mg by mouth once daily.    BUDESONIDE (PULMICORT) 0.5 MG/2 ML NEBULIZER SOLUTION    Take 2 mLs (0.5 mg total) by nebulization once daily. Controller    BUTALBITAL-ACETAMINOPHEN-CAFF -40 MG CAP    Take 1 capsule by mouth.    CHOLECALCIFEROL, VITAMIN D3, 5,000 UNIT TAB    Take 5,000 Units by mouth once daily.     FISH OIL-OMEGA-3 FATTY ACIDS 300-1,000 MG CAPSULE    Take by mouth once daily.    HYDROCODONE-ACETAMINOPHEN (NORCO) 5-325 MG PER TABLET    Take 1 tablet by mouth every 12 (twelve) hours as needed.    LABETALOL (NORMODYNE) 100 MG TABLET    TAKE 1 TABLET  BY MOUTH TWICE A DAY    MAGNESIUM OXIDE (MAG-OX) 400 MG (241.3 MG MAGNESIUM) TABLET    Take 400 mg by mouth once daily.    ONDANSETRON (ZOFRAN-ODT) 4 MG TBDL    Take 1 tablet (4 mg total) by mouth every 6 (six) hours as needed (nausea).    ROSUVASTATIN (CRESTOR) 20 MG TABLET    Take 20 mg by mouth.   Discontinued Medications    DILTIAZEM (CARDIZEM) 30 MG TABLET    Take 1 tablet by mouth 4 (four) times daily.    HYDROXYCHLOROQUINE (PLAQUENIL) 200 MG TABLET    TAKE 1 TABLET BY MOUTH EVERY DAY    LEUPROLIDE (LUPRON) 3.75 MG INJECTION    Inject 3.75 mg into the muscle.    LEVOTHYROXINE (SYNTHROID) 50 MCG TABLET    Take 1 tablet by mouth every morning.    NORETHINDRONE (AYGESTIN) 5 MG TAB    Take 1 tablet (5 mg total) by mouth once daily.    TRAMADOL (ULTRAM) 50 MG TABLET    Take 50 mg by mouth every 6 (six) hours as needed.

## 2025-02-17 ENCOUNTER — OFFICE VISIT (OUTPATIENT)
Dept: OBSTETRICS AND GYNECOLOGY | Facility: CLINIC | Age: 35
End: 2025-02-17
Payer: COMMERCIAL

## 2025-02-17 VITALS
BODY MASS INDEX: 31.67 KG/M2 | HEIGHT: 61 IN | SYSTOLIC BLOOD PRESSURE: 136 MMHG | WEIGHT: 167.75 LBS | DIASTOLIC BLOOD PRESSURE: 88 MMHG

## 2025-02-17 DIAGNOSIS — R10.31 RIGHT LOWER QUADRANT PAIN: ICD-10-CM

## 2025-02-17 DIAGNOSIS — N80.129 ENDOMETRIOMA OF OVARY: ICD-10-CM

## 2025-02-17 DIAGNOSIS — R10.32 LEFT LOWER QUADRANT PAIN: Primary | ICD-10-CM

## 2025-02-17 NOTE — PROGRESS NOTES
Subjective     Patient ID: Justine Akins is a 34 y.o. female.    Chief Complaint:  follow up  (endrometrioma)      History of Present Illness  HPI  Pt is 34 y.o. well known to our clinic with a long history of stage 4 endo.  Patient has recently finished a six-month course of Lupron.  At the conclusion of the medication, she had a great quality of life and was pain-free.  As her cycles have started back, she has started having debilitating cyclic pain that only response to Toradol.  Patient was initially interested in future fertility but given the significant impact on her quality of life, her and her partner have decided to proceed with surgical intervention and removal of both ovaries.  Here to talk about next steps    GYN & OB History  Patient's last menstrual period was 2025 (exact date).   Date of Last Pap: 12/3/2019    OB History    Para Term  AB Living   0 0 0 0 0 0   SAB IAB Ectopic Multiple Live Births   0 0 0 0 0       Review of Systems  Review of Systems   Constitutional:  Negative for activity change, appetite change and fatigue.   HENT: Negative.  Negative for tinnitus.    Eyes: Negative.    Respiratory:  Negative for cough and shortness of breath.    Cardiovascular:  Negative for chest pain and palpitations.   Gastrointestinal: Negative.  Negative for abdominal pain, blood in stool, constipation, diarrhea and nausea.   Endocrine: Negative.  Negative for hot flashes.   Genitourinary:  Positive for pelvic pain. Negative for dysmenorrhea, dyspareunia, dysuria, frequency, menstrual problem, vaginal discharge, urinary incontinence and postcoital bleeding.   Musculoskeletal:  Negative for back pain and joint swelling.   Integumentary:  Negative for rash, breast mass, nipple discharge and breast skin changes.   Neurological: Negative.  Negative for headaches.   Hematological: Negative.  Does not bruise/bleed easily.   Psychiatric/Behavioral:  The patient is not nervous/anxious.     Breast: negative.  Negative for mass, nipple discharge and skin changes         Objective   Physical Exam    def     Assessment and Plan     1. Left lower quadrant pain    2. Right lower quadrant pain    3. Endometrioma of ovary             Plan:  Justine was seen today for follow up .    Diagnoses and all orders for this visit:    Left lower quadrant pain  -     CT Abdomen Pelvis With IV Contrast Rectal Contrast; Future    Right lower quadrant pain  -     CT Abdomen Pelvis With IV Contrast Rectal Contrast; Future    Endometrioma of ovary  -     CT Abdomen Pelvis With IV Contrast Rectal Contrast; Future  -     elagolix 150 mg Tab; Take 150 mg by mouth once daily.  I had a long discussion with the patient and her partner lasting approximately 45 minutes about the different surgical options and the difficulty given her prior findings of significant adhesive disease.  While I anticipate the removal of her right ovary would not be significantly challenging, she does have extensive adhesive disease on the left side that would warrant exploration to confirm no remnant of ovary is remaining on that side to help patient achieve her desire of bilateral removal of her ovaries.  We also discussed the utility of saving her uterus.  The only benefit would be if patient desired donor in vitro fertilization in the future.  Patient and her partner are not sure that they want to go in that direction.  Given the significant adhesive disease, her best chance at having a minimally invasive approach would be using robotic technology.  I will refer the patient to Dr. Cho for surgical consultation.  In the interim, the patient and her partner will decide about her uterus.  And finally we discussed the utility of starting on orlissa which can help treat microscopic endometriosis that is not able to be surgically seen or removed.  The pathway would be such that this would be more effective than Lupron given the anticipation of removing  both of her ovaries.  All questions were answered.

## 2025-02-17 NOTE — Clinical Note
Hey team, Can I get this patient in with you for surgical consultation?  She needs a robotic BSO and has stage 4 endo.  Thanks.

## 2025-02-24 ENCOUNTER — PATIENT MESSAGE (OUTPATIENT)
Dept: OBSTETRICS AND GYNECOLOGY | Facility: CLINIC | Age: 35
End: 2025-02-24
Payer: COMMERCIAL

## 2025-02-24 DIAGNOSIS — R10.32 LEFT LOWER QUADRANT PAIN: ICD-10-CM

## 2025-02-24 DIAGNOSIS — N80.9 ENDOMETRIOSIS, UNSPECIFIED: Primary | ICD-10-CM

## 2025-02-25 RX ORDER — KETOROLAC TROMETHAMINE 10 MG/1
10 TABLET, FILM COATED ORAL EVERY 6 HOURS PRN
Qty: 20 TABLET | Refills: 5 | Status: SHIPPED | OUTPATIENT
Start: 2025-02-25 | End: 2025-03-02

## 2025-03-07 ENCOUNTER — PATIENT MESSAGE (OUTPATIENT)
Dept: OBSTETRICS AND GYNECOLOGY | Facility: CLINIC | Age: 35
End: 2025-03-07
Payer: COMMERCIAL

## 2025-03-25 ENCOUNTER — TELEPHONE (OUTPATIENT)
Dept: OBSTETRICS AND GYNECOLOGY | Facility: CLINIC | Age: 35
End: 2025-03-25
Payer: COMMERCIAL

## 2025-03-25 NOTE — TELEPHONE ENCOUNTER
----- Message from Shiva Cho MD sent at 3/25/2025  9:30 AM CDT -----  Kamryn,Have we contacted this patient?Rosa  ----- Message -----  From: Israel Bernstein MD  Sent: 2/17/2025  12:56 PM CDT  To: Shiva Cho IV, MD; Marquis BREWSTER IV #    Hey team,  Can I get this patient in with you for surgical consultation?  She needs a robotic BSO and has stage 4 endo.  Thanks.

## 2025-03-27 ENCOUNTER — OFFICE VISIT (OUTPATIENT)
Dept: OBSTETRICS AND GYNECOLOGY | Facility: CLINIC | Age: 35
End: 2025-03-27
Payer: COMMERCIAL

## 2025-03-27 VITALS
SYSTOLIC BLOOD PRESSURE: 150 MMHG | BODY MASS INDEX: 32.1 KG/M2 | WEIGHT: 170 LBS | HEIGHT: 61 IN | DIASTOLIC BLOOD PRESSURE: 94 MMHG

## 2025-03-27 DIAGNOSIS — R10.2 CHRONIC PELVIC PAIN IN FEMALE: ICD-10-CM

## 2025-03-27 DIAGNOSIS — N80.30 PELVIC PERITONEAL ENDOMETRIOSIS: Primary | ICD-10-CM

## 2025-03-27 DIAGNOSIS — G89.29 CHRONIC PELVIC PAIN IN FEMALE: ICD-10-CM

## 2025-03-27 PROCEDURE — 3008F BODY MASS INDEX DOCD: CPT | Mod: CPTII,S$GLB,, | Performed by: OBSTETRICS & GYNECOLOGY

## 2025-03-27 PROCEDURE — 99215 OFFICE O/P EST HI 40 MIN: CPT | Mod: S$GLB,,, | Performed by: OBSTETRICS & GYNECOLOGY

## 2025-03-27 PROCEDURE — 1159F MED LIST DOCD IN RCRD: CPT | Mod: CPTII,S$GLB,, | Performed by: OBSTETRICS & GYNECOLOGY

## 2025-03-27 PROCEDURE — 3080F DIAST BP >= 90 MM HG: CPT | Mod: CPTII,S$GLB,, | Performed by: OBSTETRICS & GYNECOLOGY

## 2025-03-27 PROCEDURE — 3077F SYST BP >= 140 MM HG: CPT | Mod: CPTII,S$GLB,, | Performed by: OBSTETRICS & GYNECOLOGY

## 2025-03-27 PROCEDURE — 99999 PR PBB SHADOW E&M-EST. PATIENT-LVL III: CPT | Mod: PBBFAC,,, | Performed by: OBSTETRICS & GYNECOLOGY

## 2025-03-27 NOTE — PROGRESS NOTES
CC:  Endometriosis with chronic pelvic pain    HPI:  Justine Akins  is a 34 y.o.  patient who presents today to review treatment options for severe chronic pelvic pain due to high stage endometriosis.  Patient has been operated on multiple times including 2018 and with Dr. CHASITY Bernstein to in .  Patient was diagnosed with high stage endometriosis with endometrioma removal and appendix removal (due to endometriosis) in the  surgery.  Patient reportedly has significant retroperitoneal fibrosis and adhesions with obliterated cul-de-sac.  Patient had ovarian cyst/endometrioma rupture with torsion in 2018 with removal of significant portion of ovary/adnexal during that surgery per patient history.  Per Dr. Bernstein's operative report, it was possible left ovarian remnant (difficult to assess secondary to severe adhesive disease).  Patient reports severe pain daily with worsening quality life/inability to work and function.  Patient is here to review potential minimally invasive definitive surgical treatment options.    Primary gynecologist: Israel Bernstein MD    Patient's last menstrual period was 2025 (approximate).    Chart reviewed    Past Medical History:   Diagnosis Date    Jesse-Danlos syndrome     Endometrioma     Endometriosis     Hypertension     MTHFR mutation     Ovarian cyst     POTS (postural orthostatic tachycardia syndrome)        Past Surgical History:   Procedure Laterality Date    ANKLE SURGERY Right     reconstruction after avulsion and fracture    APPENDECTOMY  5/15/24    With endometrioma removal    CHROMOTUBATION OF FALLOPIAN TUBE N/A 05/15/2023    Procedure: CHROMOTUBATION, OVIDUCT;  Surgeon: Israel Bernstein MD;  Location: [facility] OR;  Service: OB/GYN;  Laterality: N/A;    DIAGNOSTIC LAPAROSCOPY Right 2018    Procedure: LAPAROSCOPY, DIAGNOSTIC;  Surgeon: Vane Baez MD;  Location: [facility] OR;  Service: OB/GYN;  Laterality: Right;    DIAGNOSTIC LAPAROSCOPY N/A 05/15/2023     Procedure: LAPAROSCOPY, DIAGNOSTIC;  Surgeon: Israel Bernstein MD;  Location: [facility] OR;  Service: OB/GYN;  Laterality: N/A;    FRACTURE SURGERY      r ankle    JOINT REPLACEMENT      knee    KNEE ARTHROSCOPY Right 2006/2011/2013    KNEE ARTHROSCOPY W/ MENISCAL REPAIR Right     Replacement of meniscus and bone grafts    LAPAROSCOPIC APPENDECTOMY N/A 05/15/2023    Procedure: APPENDECTOMY, LAPAROSCOPIC;  Surgeon: Israel Bernstein MD;  Location: [facility] OR;  Service: OB/GYN;  Laterality: N/A;  performed by Dr. Dias    LAPAROSCOPIC LYSIS OF ADHESIONS N/A 05/15/2023    Procedure: LYSIS, ADHESIONS, LAPAROSCOPIC;  Surgeon: Israel Bernstein MD;  Location: [facility] OR;  Service: OB/GYN;  Laterality: N/A;    LAPAROSCOPIC SALPINGO-OOPHORECTOMY Right 07/31/2018    Procedure: SALPINGO-OOPHORECTOMY, LAPAROSCOPIC;  Surgeon: Vane Baez MD;  Location: [facility] OR;  Service: OB/GYN;  Laterality: Right;    SURGICAL REMOVAL OF ENDOMETRIOMA Bilateral 05/15/2023    Procedure: EXCISION, ENDOMETRIOMA;  Surgeon: Israel Bernstein MD;  Location: [facility] OR;  Service: OB/GYN;  Laterality: Bilateral;    TONSILLECTOMY  1994         ROS:  GENERAL: Feeling well overall.   SKIN: Denies rash or lesions.   HEAD: Denies head injury or headache.   NODES: Denies enlarged lymph nodes.   CHEST: Denies chest pain or shortness of breath.   CARDIOVASCULAR: Denies palpitations or left sided chest pain.   ABDOMEN: No abdominal pain, nausea, vomiting or rectal bleeding.   URINARY: No dysuria, hematuria, or burning on urination.  REPRODUCTIVE: See HPI.   BREASTS: Denies pain, lumps, or nipple discharge.   HEMATOLOGIC: No easy bruisability or excessive bleeding.   MUSCULOSKELETAL: Denies joint pain or swelling.   NEUROLOGIC: Denies syncope or weakness.   PSYCHIATRIC: Denies depression.    PE:   Deferred per patient/refused due to severe pain.     Diagnosis:  1. Pelvic peritoneal endometriosis    2. Chronic pelvic pain in female      PLAN:  Patient was counseled today on high  stage endometriosis/severe chronic pelvic pain.  Medical/conservative surgical/definitive surgical treatment options reviewed in detail.    Patient is strongly considering minimally invasive hysterectomy with right salpingo-oophorectomy/resection of endometriosis/resection of possible left ovarian remnant.    Increased risks due to severe adhesive disease review.  This increased risk includes injury to bowel, bladder, and pelvic sidewall structures including major blood vessels and ureters.  Patient verbalized understanding of this discussion.    All questions reviewed and answered    Patient to contact office for follow up/surgery scheduling.    Dr. Bernstein to be copied on this note    Billing based on face-to-face consultation time/chart review/chart completion:  48 minutes    Shiva Cho IV, MD     Answers submitted by the patient for this visit:  Gynecologic Exam Questionnaire  (Submitted on 3/27/2025)  Chief Complaint: Gynecologic exam  genital itching: No  genital lesions: No  genital odor: No  genital rash: No  missed menses: No  pelvic pain: Yes  vaginal bleeding: Yes  vaginal discharge: No  Chronicity: chronic  Onset: more than 1 year ago  Frequency: constantly  Progression since onset: rapidly worsening  Pain severity: severe  Pregnant now?: No  abdominal pain: Yes  anorexia: Yes  back pain: No  chills: No  constipation: No  diarrhea: Yes  discolored urine: No  dysuria: Yes  fever: No  flank pain: No  frequency: Yes  headaches: Yes  hematuria: No  nausea: Yes  painful intercourse: Yes  rash: No  urgency: Yes  vomiting: No  Please select the characteristics of your discharge: : normal  Vaginal bleeding: spotting  Passing clots?: Yes  Aggravated by: activity, bowel movements, eating, heavy lifting, intercourse, tactile pressure, urinating  treatments tried: acetaminophen, heating pad, NSAIDs, oral narcotics, warm bath  Improvement on treatment: mild  Sexual activity: sexually active  Partner with STD  symptoms: no  Menstrual history: irregular  STD: No  abdominal surgery: Yes   section: No  Ectopic pregnancy: No  Endometriosis: Yes  herpes simplex: No  gynecological surgery: Yes  menorrhagia: Yes  metrorrhagia: Yes  miscarriage: No  ovarian cysts: Yes  perineal abscess: No  PID: No  terminated pregnancy: No  vaginosis: No

## 2025-03-31 ENCOUNTER — TELEPHONE (OUTPATIENT)
Dept: OBSTETRICS AND GYNECOLOGY | Facility: CLINIC | Age: 35
End: 2025-03-31
Payer: COMMERCIAL

## 2025-03-31 DIAGNOSIS — N80.9 ENDOMETRIOSIS DETERMINED BY LAPAROSCOPY: Primary | ICD-10-CM

## 2025-03-31 DIAGNOSIS — R10.2 PELVIC PAIN: ICD-10-CM

## 2025-03-31 NOTE — TELEPHONE ENCOUNTER
----- Message from Iván Wyatt sent at 3/31/2025 12:25 PM CDT -----  Who Called: VALENTINE SHARMA [[id number]]     Who Left Message for Patient: Kamryn      Does the patient know what this is regarding? Unsure     Best Call Back Number: [phone]     Additional Information: return call

## 2025-04-06 ENCOUNTER — PATIENT MESSAGE (OUTPATIENT)
Dept: OBSTETRICS AND GYNECOLOGY | Facility: CLINIC | Age: 35
End: 2025-04-06
Payer: COMMERCIAL

## 2025-04-22 ENCOUNTER — PATIENT MESSAGE (OUTPATIENT)
Dept: OBSTETRICS AND GYNECOLOGY | Facility: CLINIC | Age: 35
End: 2025-04-22
Payer: COMMERCIAL

## 2025-05-19 ENCOUNTER — ANESTHESIA EVENT (OUTPATIENT)
Dept: SURGERY | Facility: OTHER | Age: 35
End: 2025-05-19
Payer: COMMERCIAL

## 2025-05-20 ENCOUNTER — PATIENT MESSAGE (OUTPATIENT)
Dept: OBSTETRICS AND GYNECOLOGY | Facility: CLINIC | Age: 35
End: 2025-05-20

## 2025-05-20 ENCOUNTER — HOSPITAL ENCOUNTER (OUTPATIENT)
Dept: PREADMISSION TESTING | Facility: OTHER | Age: 35
Discharge: HOME | End: 2025-05-20
Attending: OBSTETRICS & GYNECOLOGY
Payer: COMMERCIAL

## 2025-05-20 ENCOUNTER — OFFICE VISIT (OUTPATIENT)
Dept: OBSTETRICS AND GYNECOLOGY | Facility: CLINIC | Age: 35
End: 2025-05-20
Payer: COMMERCIAL

## 2025-05-20 ENCOUNTER — HOSPITAL ENCOUNTER (OUTPATIENT)
Dept: RADIOLOGY | Facility: OTHER | Age: 35
Discharge: HOME | End: 2025-05-20
Attending: OBSTETRICS & GYNECOLOGY
Payer: COMMERCIAL

## 2025-05-20 VITALS
BODY MASS INDEX: 31.91 KG/M2 | TEMPERATURE: 98 F | OXYGEN SATURATION: 100 % | DIASTOLIC BLOOD PRESSURE: 84 MMHG | RESPIRATION RATE: 18 BRPM | SYSTOLIC BLOOD PRESSURE: 145 MMHG | WEIGHT: 169 LBS | HEART RATE: 77 BPM | HEIGHT: 61 IN

## 2025-05-20 VITALS — SYSTOLIC BLOOD PRESSURE: 124 MMHG | DIASTOLIC BLOOD PRESSURE: 86 MMHG

## 2025-05-20 DIAGNOSIS — Z01.818 PREOP TESTING: Primary | ICD-10-CM

## 2025-05-20 DIAGNOSIS — N80.30 PELVIC PERITONEAL ENDOMETRIOSIS: ICD-10-CM

## 2025-05-20 DIAGNOSIS — N80.129 ENDOMETRIOMA OF OVARY: Primary | ICD-10-CM

## 2025-05-20 DIAGNOSIS — M32.9 SLE (SYSTEMIC LUPUS ERYTHEMATOSUS RELATED SYNDROME): ICD-10-CM

## 2025-05-20 DIAGNOSIS — Z86.711 HISTORY OF PULMONARY EMBOLISM: ICD-10-CM

## 2025-05-20 DIAGNOSIS — G89.29 CHRONIC PELVIC PAIN IN FEMALE: ICD-10-CM

## 2025-05-20 DIAGNOSIS — N80.129 ENDOMETRIOMA OF OVARY: ICD-10-CM

## 2025-05-20 DIAGNOSIS — Q79.60 EHLERS-DANLOS SYNDROME: ICD-10-CM

## 2025-05-20 DIAGNOSIS — R10.2 CHRONIC PELVIC PAIN IN FEMALE: ICD-10-CM

## 2025-05-20 LAB
ABSOLUTE EOSINOPHIL (OHS): 0.15 K/UL
ABSOLUTE MONOCYTE (OHS): 0.79 K/UL (ref 0.3–1)
ABSOLUTE NEUTROPHIL COUNT (OHS): 5.52 K/UL (ref 1.8–7.7)
ANION GAP (OHS): 6 MMOL/L (ref 8–16)
BASOPHILS # BLD AUTO: 0.05 K/UL
BASOPHILS NFR BLD AUTO: 0.6 %
BUN SERPL-MCNC: 12 MG/DL (ref 6–20)
CALCIUM SERPL-MCNC: 9.7 MG/DL (ref 8.7–10.5)
CHLORIDE SERPL-SCNC: 106 MMOL/L (ref 95–110)
CO2 SERPL-SCNC: 26 MMOL/L (ref 23–29)
CREAT SERPL-MCNC: 0.8 MG/DL (ref 0.5–1.4)
ERYTHROCYTE [DISTWIDTH] IN BLOOD BY AUTOMATED COUNT: 12.4 % (ref 11.5–14.5)
GFR SERPLBLD CREATININE-BSD FMLA CKD-EPI: >60 ML/MIN/1.73/M2
GLUCOSE SERPL-MCNC: 103 MG/DL (ref 70–110)
HCT VFR BLD AUTO: 42.4 % (ref 37–48.5)
HGB BLD-MCNC: 13.9 GM/DL (ref 12–16)
IMM GRANULOCYTES # BLD AUTO: 0.03 K/UL (ref 0–0.04)
IMM GRANULOCYTES NFR BLD AUTO: 0.3 % (ref 0–0.5)
INDIRECT COOMBS: NORMAL
LYMPHOCYTES # BLD AUTO: 2.24 K/UL (ref 1–4.8)
MCH RBC QN AUTO: 29.3 PG (ref 27–31)
MCHC RBC AUTO-ENTMCNC: 32.8 G/DL (ref 32–36)
MCV RBC AUTO: 89 FL (ref 82–98)
NUCLEATED RBC (/100WBC) (OHS): 0 /100 WBC
PLATELET # BLD AUTO: 292 K/UL (ref 150–450)
PMV BLD AUTO: 10.2 FL (ref 9.2–12.9)
POTASSIUM SERPL-SCNC: 4.3 MMOL/L (ref 3.5–5.1)
RBC # BLD AUTO: 4.75 M/UL (ref 4–5.4)
RELATIVE EOSINOPHIL (OHS): 1.7 %
RELATIVE LYMPHOCYTE (OHS): 25.5 % (ref 18–48)
RELATIVE MONOCYTE (OHS): 9 % (ref 4–15)
RELATIVE NEUTROPHIL (OHS): 62.9 % (ref 38–73)
RH BLD: NORMAL
SODIUM SERPL-SCNC: 138 MMOL/L (ref 136–145)
SPECIMEN OUTDATE: NORMAL
WBC # BLD AUTO: 8.78 K/UL (ref 3.9–12.7)

## 2025-05-20 PROCEDURE — 99999 PR PBB SHADOW E&M-EST. PATIENT-LVL II: CPT | Mod: PBBFAC,,, | Performed by: OBSTETRICS & GYNECOLOGY

## 2025-05-20 PROCEDURE — 99215 OFFICE O/P EST HI 40 MIN: CPT | Mod: S$GLB,,, | Performed by: OBSTETRICS & GYNECOLOGY

## 2025-05-20 PROCEDURE — 85025 COMPLETE CBC W/AUTO DIFF WBC: CPT

## 2025-05-20 PROCEDURE — 76830 TRANSVAGINAL US NON-OB: CPT | Mod: TC

## 2025-05-20 PROCEDURE — 82310 ASSAY OF CALCIUM: CPT

## 2025-05-20 PROCEDURE — 76830 TRANSVAGINAL US NON-OB: CPT | Mod: 26,,, | Performed by: RADIOLOGY

## 2025-05-20 PROCEDURE — 86850 RBC ANTIBODY SCREEN: CPT

## 2025-05-20 PROCEDURE — 76856 US EXAM PELVIC COMPLETE: CPT | Mod: 26,,, | Performed by: RADIOLOGY

## 2025-05-20 RX ORDER — SODIUM CHLORIDE 9 MG/ML
INJECTION, SOLUTION INTRAVENOUS CONTINUOUS
OUTPATIENT
Start: 2025-05-27

## 2025-05-20 RX ORDER — FAMOTIDINE 20 MG/1
20 TABLET, FILM COATED ORAL
OUTPATIENT
Start: 2025-05-27

## 2025-05-20 RX ORDER — ALBUTEROL SULFATE 2.5 MG/.5ML
2.5 SOLUTION RESPIRATORY (INHALATION)
OUTPATIENT
Start: 2025-05-20 | End: 2025-05-20

## 2025-05-20 RX ORDER — LIDOCAINE HYDROCHLORIDE 10 MG/ML
0.5 INJECTION, SOLUTION EPIDURAL; INFILTRATION; INTRACAUDAL; PERINEURAL ONCE
OUTPATIENT
Start: 2025-05-20 | End: 2025-05-20

## 2025-05-20 RX ORDER — SODIUM CHLORIDE, SODIUM LACTATE, POTASSIUM CHLORIDE, CALCIUM CHLORIDE 600; 310; 30; 20 MG/100ML; MG/100ML; MG/100ML; MG/100ML
INJECTION, SOLUTION INTRAVENOUS CONTINUOUS
OUTPATIENT
Start: 2025-05-20

## 2025-05-20 RX ORDER — ACETAMINOPHEN 500 MG
1000 TABLET ORAL
OUTPATIENT
Start: 2025-05-27

## 2025-05-20 RX ORDER — ENOXAPARIN SODIUM 100 MG/ML
40 INJECTION SUBCUTANEOUS DAILY
COMMUNITY

## 2025-05-20 NOTE — DISCHARGE INSTRUCTIONS
Information to Prepare you for your Surgery    PRE-ADMIT TESTING -  [phone]   -Adriana  [address] [place] [address] [facility] [address]  [facility]      Your surgery has been scheduled at [facility]. We are pleased to have the opportunity to serve you. For Further Information please call [phone].    On the day of surgery please report to the Information Desk on the 1st floor.    CONTACT YOUR PHYSICIAN'S OFFICE THE DAY PRIOR TO YOUR SURGERY TO OBTAIN YOUR ARRIVAL TIME.     The evening before surgery do not eat anything after 9 p.m. ( this includes hard candy, chewing gum and mints). You may only have GATORADE, POWERADE AND WATER  from 9 p.m. until you leave your home.    AT LEAST 12 OUNCES BEFORE YOU LEAVE YOU HOUSE IN THE MORNING TO COME TO SURGERY.    DO NOT DRINK ANY LIQUIDS ON THE WAY TO THE HOSPITAL.      Why does your anesthesiologist allow you to drink Gatorade/Powerade before surgery?  Gatorade/Powerade helps to increase your comfort before surgery and to decrease your nausea after surgery. The carbohydrates in Gatorade/Powerade help reduce your body's stress response to surgery.    Outpatient Surgery- May allow 2 adult (18 and older) Support Persons (1 being the designated ) for all surgical/procedural patients. A breastfeeding mother will be allowed her infant and 2 adult Support Persons. No one under the age of 18 will be allowed in the building. No swapping out of visitors in the [facility].      SPECIAL MEDICATION INSTRUCTIONS: TAKE medications checked off  on your Medication List.    Please bring blood pressure medications  and inhaler the day of surgery.     Please bring your first morning urine in specimen cup the day of surgery.    Surgery Patients:    If you take ASPIRIN - Your PHYSICIAN/SURGEON will need to inform you IF/OR when you need to stop taking aspirin prior to your surgery.     The week  prior to surgery do not ot take any medications containing IBUPROFEN or NSAIDS ( Advil, Motrin, Goodys, BC, Aleve, Naproxen,  Ketorolac, Meloxicam, Mobic, Toradol,etc) If you are not sure if you should take a medicine please call your surgeon's office.  Ok to take Tylenol    Do Not Wear any make-up (especially eye make-up) to surgery. Please remove any false eyelashes or eyelash extensions. If you arrive the day of surgery with makeup/eyelashes on you will be required to remove prior to surgery. (There is a risk of corneal abrasions if eye makeup/eyelash extensions are not removed)      Leave all valuables at home.   Do Not wear any jewelry or watches, including any metal in body piercings. Jewelry must be removed prior to coming to the hospital.  There is a possibility that rings that are unable to be removed may be cut off if they are on the surgical extremity.    Please remove all hair extensions, wigs, clips and any other metal accessories/ ornaments from your hair.  These items may pose a flammable/fire risk in Surgery and must be removed.    Do not shave your surgical area at least 5 days prior to your surgery. The surgical prep will be performed at the hospital according to Infection Control regulations.    Contact Lens must be removed before surgery. Either do not wear the contact lens or bring a case and solution for storage.  Please bring a container for eyeglasses or dentures as required.  Bring any paperwork your physician has provided, such as consent forms,  history and physicals, doctor's orders, etc.   Bring comfortable clothes that are loose fitting to wear upon discharge. Take into consideration the type of surgery being performed.  Maintain your diet as advised per your physician the day prior to surgery.      Adequate rest the night before surgery is advised.   Park in the Parking lot behind the hospital or in the [employer] [facility] across the street from the parking lot. Parking is  complimentary.  If you will be discharged the same day as your procedure, please arrange for a responsible adult to drive you home or to accompany you if traveling by taxi.   YOU WILL NOT BE PERMITTED TO DRIVE OR TO LEAVE THE HOSPITAL ALONE AFTER SURGERY.   If you are being discharged the same day, it is strongly recommended that you arrange for someone to remain with you for the first 24 hrs following your surgery.    The Surgeon will speak to your family/visitor after your surgery regarding the outcome of your surgery and post op care.  The Surgeon may speak to you after your surgery, but there is a possibility you may not remember the details.  Please check with your family members regarding the conversation with the Surgeon.    We strongly recommend whoever is bringing you home be present for discharge instructions.  This will ensure a thorough understanding for your post op home care.    If the patient has fever, cough, or signs/symptoms of Flu or Covid please do not come in for your surgery. Contact your surgeon and your primary care physician for further instructions.           Thank you for your cooperation.  The Staff of [facility].            Bathing Instructions with Hibiclens or Dial Soap    Shower the evening before and morning of your procedure with Chlorhexidine (Hibiclens)  do not use Chlorhexidine on your face or genitals. Do not get in your eyes.  Wash your face with water and your regular face wash/soap  Use your regular shampoo  Apply Chlorhexidine (Hibiclens) directly on your skin or on a wet washcloth and wash gently. When showering: Move away from the shower stream when applying Chlorhexidine (Hibiclens) to avoid rinsing off too soon.  Rinse thoroughly with warm water  Do not dilute Chlorhexidine (Hibiclens)   Dry off as usual, do not use any deodorant, powder, body lotions, perfume, after shave or cologne.   Place clean sheets on bed day before surgery.    Thanks ,    Adriana

## 2025-05-20 NOTE — PROGRESS NOTES
CC:  Chronic pelvic pain with high stage endometriosis/ovarian endometrioma    HPI : Justine Akins is a 34 y.o.   patient who presents for evaluation and discussion of treatment options for evere chronic pelvic pain due to high stage endometriosis.  Patient has been operated on multiple times including 2018 and with Dr. CHASITY Bernstein in .  Patient was diagnosed with high stage endometriosis with endometrioma removal and appendix removal (due to endometriosis) in the  surgery.  Patient reportedly has significant retroperitoneal fibrosis and adhesions with obliterated cul-de-sac.  Patient had ovarian cyst/endometrioma rupture with torsion in 2018 with removal of significant portion of ovary/adnexal during that surgery per patient history.  Per Dr. Bernstein's operative report, it was not possible to remove left ovarian remnant (difficult to assess secondary to severe adhesive disease).  Patient reports severe pain daily with worsening quality life/inability to work and function.  Patient is again here to review potential minimally invasive definitive surgical treatment options.  Patient desires hysterectomy with removal of right ovary/removal of left ovarian remnant if possible.    Patient's last menstrual period was 2025 (approximate).    Primary gynecologist:  Israel Bernstein MD    Chart reviewed      Past Medical History:   Diagnosis Date    Asthma     environmental related    Jesse-Danlos syndrome     Endometrioma     Endometriosis     Hyperlipidemia     Hypertension     MTHFR mutation     Ovarian cyst     POTS (postural orthostatic tachycardia syndrome)     Pulmonary embolism     Wears prescription eyeglasses      Past Surgical History:   Procedure Laterality Date    ANKLE SURGERY Right     reconstruction after avulsion and fracture    APPENDECTOMY  5/15/24    With endometrioma removal    CHROMOTUBATION OF FALLOPIAN TUBE N/A 05/15/2023    Procedure: CHROMOTUBATION, OVIDUCT;  Surgeon: Israel  UZIEL Bernstein MD;  Location: [facility] OR;  Service: OB/GYN;  Laterality: N/A;    DIAGNOSTIC LAPAROSCOPY Right 2018    Procedure: LAPAROSCOPY, DIAGNOSTIC;  Surgeon: Vane Baez MD;  Location: [facility] OR;  Service: OB/GYN;  Laterality: Right;    DIAGNOSTIC LAPAROSCOPY N/A 05/15/2023    Procedure: LAPAROSCOPY, DIAGNOSTIC;  Surgeon: Israel Bernstein MD;  Location: [facility] OR;  Service: OB/GYN;  Laterality: N/A;    FRACTURE SURGERY Right     r ankle    JOINT REPLACEMENT Right     knee    KNEE ARTHROSCOPY Right     KNEE ARTHROSCOPY W/ MENISCAL REPAIR Right     Replacement of meniscus and bone grafts    LAPAROSCOPIC APPENDECTOMY N/A 05/15/2023    Procedure: APPENDECTOMY, LAPAROSCOPIC;  Surgeon: Israel Bernstein MD;  Location: [facility];  Service: OB/GYN;  Laterality: N/A;  performed by Dr. Dias    LAPAROSCOPIC LYSIS OF ADHESIONS N/A 05/15/2023    Procedure: LYSIS, ADHESIONS, LAPAROSCOPIC;  Surgeon: Israel Bernstein MD;  Location: [facility] OR;  Service: OB/GYN;  Laterality: N/A;    LAPAROSCOPIC SALPINGO-OOPHORECTOMY Right 2018    Procedure: SALPINGO-OOPHORECTOMY, LAPAROSCOPIC;  Surgeon: Vane Baez MD;  Location: [facility] OR;  Service: OB/GYN;  Laterality: Right;    SURGICAL REMOVAL OF ENDOMETRIOMA Bilateral 05/15/2023    Procedure: EXCISION, ENDOMETRIOMA;  Surgeon: Israel Bernstein MD;  Location: [facility];  Service: OB/GYN;  Laterality: Bilateral;    TONSILLECTOMY       Family History   Problem Relation Name Age of Onset    Diabetes Mother Mother     Ovarian cancer Mother Mother     Fibromyalgia Mother Mother     Arthritis Mother Mother     Asthma Mother Mother     Miscarriages / Stillbirths Mother Mother     Heart disease Father Father     Diabetes Father Father     Heart attack Father Father     Hyperlipidemia Father Father     Glaucoma Maternal Grandmother      Macular degeneration Neg Hx       Social History[1]  OB History    Para Term  AB Living   0 0 0 0 0 0   SAB IAB Ectopic Multiple Live Births    0 0 0 0 0       /86   LMP 05/07/2025 (Approximate)     ROS:  GENERAL: Feeling well overall.   SKIN: Denies rash or lesions.   HEAD: Denies head injury or headache.   NODES: Denies enlarged lymph nodes.   CHEST: Denies chest pain or shortness of breath.   CARDIOVASCULAR: Denies palpitations or left sided chest pain.   ABDOMEN: No abdominal pain, constipation, diarrhea, nausea, vomiting or rectal bleeding.   URINARY: No frequency, dysuria, hematuria, or burning on urination.  REPRODUCTIVE: See HPI.   BREASTS: Denies pain, lumps, or nipple discharge.   HEMATOLOGIC: No easy bruisability.  MUSCULOSKELETAL: Denies joint pain or swelling.   NEUROLOGIC: Denies syncope or weakness.   PSYCHIATRIC: Denies depression, anxiety or mood swings.      PHYSICAL EXAM:  APPEARANCE: Well nourished, well developed, in no acute distress.  AFFECT: WNL, alert and oriented x 3  SKIN: No acne or hirsutism  NECK: Neck symmetric without masses or thyromegaly  NODES: No inguinal, cervical, axillary, or femoral lymph node enlargement  CHEST: Good respiratory effect  ABDOMEN: Soft.  No tenderness or masses.  No hepatosplenomegaly.  No hernias.  PELVIC: Normal external genitalia without lesions.   Normal hair distribution.  Patient with very tight perineum muscles with placement of speculum.  Adequate perineal body, normal urethral meatus.  Vagina moist and well rugated without lesions or discharge.  Cervix pink, without lesions, discharge or tenderness.  Mild discomfort to cervical manipulation.  No significant cystocele or rectocele.  Bimanual exam reveals adnexal fullness bilaterally.  Uterus was approximately 8 cm on bimanual exam.  Significant discomfort was noted during bimanual exam.  EXTREMITIES: No edema.    ASSESSMENT & PLAN:  1. Endometrioma of ovary (Primary)  - US Pelvis Comp with Transvag NON-OB (xpd; Future    2. Pelvic peritoneal endometriosis    3. Chronic pelvic pain in female    4. History of pulmonary embolism    5.  Jesse-Danlos syndrome    6. SLE (systemic lupus erythematosus related syndrome)      I have discussed the risks, benefits, indications, and alternatives of the procedure in detail.  The patient verbalizes her understanding.  All questions answered.  Consents signed.  The patient agrees to proceed to surgery scheduling.    Plan:  Robotic assisted hysterectomy with bilateral salpingectomy and right oophorectomy/attempt to remove left ovarian remnant    Patient counseled on significantly increased risk of injury to rectum/sigmoid colon due to stage IV endometriosis/severe retroperitoneal fibrosis that was noted in Dr. Bernstein's surgery.    Patient counseled at length on menopause/possibility of not being able to supply ERT post bilateral salpingo-oophorectomy (patient with history of pulmonary embolism).  Patient is strongly encouraged to contact her hematologist oncologist to see whether she is candidate for ERT.  Patient verbalized understanding of this discussion and desires bilateral salpingo-oophorectomy.  She instructed to contact office if she changes her mind/desires ovarian conservation.  Patient counseled on possible inability to save her ovaries due to stage IV endometriosis/severe endometriosis.  Also strongly encouraged to follow up with pelvic sonogram that was ordered today.  Patient did not get follow up CT scan as or by Dr. Bernstein.    Patient reports that she has prophylactic Lovenox (40 mg) at home.  Patient instructed to initiate prophylactic Lovenox on Sunday and Monday prior to surgery on Tuesday.  Patient instructed to not use on day of surgery (Tuesday).  Patient instructed to re-initiate prophylactic Lovenox on Wednesday Sunday following surgery.  Patient verbalized understanding of this discussion and recommendation.    Dr. Bernstein to be copied on this note.    Shiva Cho IV, MD                       [1]   Social History  Tobacco Use    Smoking status: Never     Passive exposure: Never     Smokeless tobacco: Never   Vaping Use    Vaping status: Never Used   Substance Use Topics    Alcohol use: No    Drug use: Yes     Types: Hydrocodone     Comment: rx

## 2025-05-20 NOTE — ANESTHESIA PREPROCEDURE EVALUATION
05/19/2025  Justine Akins is a 34 y.o., female.      Pre-op Assessment    I have reviewed the Patient Summary Reports.     I have reviewed the Nursing Notes. I have reviewed the NPO Status.   I have reviewed the Medications.     Review of Systems  Anesthesia Hx:             Denies Family Hx of Anesthesia complications.    Denies Personal Hx of Anesthesia complications.                    Social:  Non-Smoker       Hematology/Oncology:  Hematology Normal   Oncology Normal                Hematology Comments: MTHFR mutation                     EENT/Dental:  EENT/Dental Normal           Cardiovascular:     Hypertension           hyperlipidemia    POTS                           Pulmonary:    Asthma mild    H/o PE 2015, per pt Dr. Cho will have her take lovenox Sunday, Monday, hold Tuesday, and resume Wednesday x5 days               Renal/:  Renal/ Normal                 Hepatic/GI:     GERD, well controlled                Musculoskeletal:     Lupus  Jesse-Danlos syndrome, reports causes instability and occipital cervical neuralgia which is aggravated from certain neck movements. Reports also has cervical DDD         Spine Disorders: cervical Degenerative disease           Neurological:    Neuromuscular Disease, (fibromyalgia)  Headaches           Chronic Pain Syndrome                         Endocrine:   Hypothyroidism        Obesity / BMI > 30  Dermatological:  Skin Normal    Jesse-Danlos syndrome  Psych:  Psychiatric Normal                    Physical Exam  General: Well nourished, Cooperative, Alert and Oriented    Airway:  Mallampati: II   Mouth Opening: Normal  TM Distance: Normal  Tongue: Normal  Neck ROM: Normal ROM    Dental:  Retainer  Top and bottom permanent retainers      Anesthesia Plan  Type of Anesthesia, risks & benefits discussed:    Anesthesia Type: Gen ETT  Intra-op Monitoring  Plan: Standard ASA Monitors  Post Op Pain Control Plan: multimodal analgesia  Induction:  IV  Airway Plan: Video, Post-Induction  Informed Consent: Informed consent signed with the Patient and all parties understand the risks and agree with anesthesia plan.  All questions answered.   ASA Score: 3  Day of Surgery Review of History & Physical: H&P Update referred to the surgeon/provider.  Anesthesia Plan Notes: CBC, BMP, T&S  -requests very small amount of anxiety medication IV before OR  -requests no students    Ready For Surgery From Anesthesia Perspective.     .

## 2025-05-23 ENCOUNTER — PATIENT MESSAGE (OUTPATIENT)
Dept: OBSTETRICS AND GYNECOLOGY | Facility: CLINIC | Age: 35
End: 2025-05-23
Payer: COMMERCIAL

## 2025-05-27 ENCOUNTER — ANESTHESIA (OUTPATIENT)
Dept: SURGERY | Facility: OTHER | Age: 35
End: 2025-05-27
Payer: COMMERCIAL

## 2025-05-27 ENCOUNTER — HOSPITAL ENCOUNTER (OUTPATIENT)
Facility: OTHER | Age: 35
Discharge: HOME | End: 2025-05-27
Attending: OBSTETRICS & GYNECOLOGY | Admitting: OBSTETRICS & GYNECOLOGY
Payer: COMMERCIAL

## 2025-05-27 VITALS
DIASTOLIC BLOOD PRESSURE: 79 MMHG | TEMPERATURE: 98 F | BODY MASS INDEX: 31.91 KG/M2 | HEART RATE: 89 BPM | HEIGHT: 61 IN | RESPIRATION RATE: 16 BRPM | SYSTOLIC BLOOD PRESSURE: 145 MMHG | WEIGHT: 169 LBS | OXYGEN SATURATION: 99 %

## 2025-05-27 DIAGNOSIS — R10.2 CHRONIC PELVIC PAIN IN FEMALE: ICD-10-CM

## 2025-05-27 DIAGNOSIS — N80.30 PELVIC PERITONEAL ENDOMETRIOSIS: ICD-10-CM

## 2025-05-27 DIAGNOSIS — Q79.60 EHLERS-DANLOS SYNDROME: ICD-10-CM

## 2025-05-27 DIAGNOSIS — M32.9 SLE (SYSTEMIC LUPUS ERYTHEMATOSUS RELATED SYNDROME): ICD-10-CM

## 2025-05-27 DIAGNOSIS — N80.129 ENDOMETRIOMA OF OVARY: ICD-10-CM

## 2025-05-27 DIAGNOSIS — N80.9 ENDOMETRIOSIS DETERMINED BY LAPAROSCOPY: ICD-10-CM

## 2025-05-27 DIAGNOSIS — G89.29 CHRONIC PELVIC PAIN IN FEMALE: ICD-10-CM

## 2025-05-27 DIAGNOSIS — Z86.711 HISTORY OF PULMONARY EMBOLISM: ICD-10-CM

## 2025-05-27 DIAGNOSIS — Z98.890 S/P ROBOT-ASSISTED SURGICAL PROCEDURE: Primary | ICD-10-CM

## 2025-05-27 DIAGNOSIS — R10.2 PELVIC PAIN: ICD-10-CM

## 2025-05-27 LAB
B-HCG UR QL: NEGATIVE
CTP QC/QA: YES
POCT GLUCOSE: 107 MG/DL (ref 70–110)

## 2025-05-27 PROCEDURE — 25000003 PHARM REV CODE 250: Performed by: OBSTETRICS & GYNECOLOGY

## 2025-05-27 PROCEDURE — 25000003 PHARM REV CODE 250: Performed by: NURSE ANESTHETIST, CERTIFIED REGISTERED

## 2025-05-27 PROCEDURE — 37000008 HC ANESTHESIA 1ST 15 MINUTES: Performed by: OBSTETRICS & GYNECOLOGY

## 2025-05-27 PROCEDURE — 63600175 PHARM REV CODE 636 W HCPCS

## 2025-05-27 PROCEDURE — C1765 ADHESION BARRIER: HCPCS | Performed by: OBSTETRICS & GYNECOLOGY

## 2025-05-27 PROCEDURE — 27201423 OPTIME MED/SURG SUP & DEVICES STERILE SUPPLY: Performed by: OBSTETRICS & GYNECOLOGY

## 2025-05-27 PROCEDURE — 25000003 PHARM REV CODE 250: Performed by: ANESTHESIOLOGY

## 2025-05-27 PROCEDURE — 36000713 HC OR TIME LEV V EA ADD 15 MIN: Performed by: OBSTETRICS & GYNECOLOGY

## 2025-05-27 PROCEDURE — 25000003 PHARM REV CODE 250

## 2025-05-27 PROCEDURE — 58571 TLH W/T/O 250 G OR LESS: CPT | Mod: 22,,, | Performed by: OBSTETRICS & GYNECOLOGY

## 2025-05-27 PROCEDURE — 82962 GLUCOSE BLOOD TEST: CPT | Performed by: OBSTETRICS & GYNECOLOGY

## 2025-05-27 PROCEDURE — 58571 TLH W/T/O 250 G OR LESS: CPT | Mod: 22,AS,, | Performed by: NURSE PRACTITIONER

## 2025-05-27 PROCEDURE — 71000039 HC RECOVERY, EACH ADD'L HOUR: Performed by: OBSTETRICS & GYNECOLOGY

## 2025-05-27 PROCEDURE — 37000009 HC ANESTHESIA EA ADD 15 MINS: Performed by: OBSTETRICS & GYNECOLOGY

## 2025-05-27 PROCEDURE — 71000033 HC RECOVERY, INTIAL HOUR: Performed by: OBSTETRICS & GYNECOLOGY

## 2025-05-27 PROCEDURE — 63600175 PHARM REV CODE 636 W HCPCS: Performed by: ANESTHESIOLOGY

## 2025-05-27 PROCEDURE — 81025 URINE PREGNANCY TEST: CPT

## 2025-05-27 PROCEDURE — 50949 UNLISTED LAPS PX URETER: CPT | Mod: AS,,, | Performed by: NURSE PRACTITIONER

## 2025-05-27 PROCEDURE — 50949 UNLISTED LAPS PX URETER: CPT | Mod: ,,, | Performed by: OBSTETRICS & GYNECOLOGY

## 2025-05-27 PROCEDURE — 63600175 PHARM REV CODE 636 W HCPCS: Performed by: NURSE ANESTHETIST, CERTIFIED REGISTERED

## 2025-05-27 PROCEDURE — 25000242 PHARM REV CODE 250 ALT 637 W/ HCPCS

## 2025-05-27 PROCEDURE — 63600175 PHARM REV CODE 636 W HCPCS: Performed by: STUDENT IN AN ORGANIZED HEALTH CARE EDUCATION/TRAINING PROGRAM

## 2025-05-27 PROCEDURE — 63600175 PHARM REV CODE 636 W HCPCS: Performed by: OBSTETRICS & GYNECOLOGY

## 2025-05-27 PROCEDURE — 71000016 HC POSTOP RECOV ADDL HR: Performed by: OBSTETRICS & GYNECOLOGY

## 2025-05-27 PROCEDURE — 58662 LAPAROSCOPY EXCISE LESIONS: CPT | Mod: 51,,, | Performed by: OBSTETRICS & GYNECOLOGY

## 2025-05-27 PROCEDURE — 71000015 HC POSTOP RECOV 1ST HR: Performed by: OBSTETRICS & GYNECOLOGY

## 2025-05-27 PROCEDURE — 36000712 HC OR TIME LEV V 1ST 15 MIN: Performed by: OBSTETRICS & GYNECOLOGY

## 2025-05-27 PROCEDURE — 25000003 PHARM REV CODE 250: Performed by: STUDENT IN AN ORGANIZED HEALTH CARE EDUCATION/TRAINING PROGRAM

## 2025-05-27 PROCEDURE — 88307 TISSUE EXAM BY PATHOLOGIST: CPT | Mod: TC,91 | Performed by: OBSTETRICS & GYNECOLOGY

## 2025-05-27 PROCEDURE — 58662 LAPAROSCOPY EXCISE LESIONS: CPT | Mod: AS,51,, | Performed by: NURSE PRACTITIONER

## 2025-05-27 DEVICE — BARRIER INTERCEED 3X4 ST DIS: Type: IMPLANTABLE DEVICE | Site: PELVIS | Status: FUNCTIONAL

## 2025-05-27 RX ORDER — ACETAMINOPHEN 500 MG
1000 TABLET ORAL EVERY 6 HOURS PRN
Qty: 60 TABLET | Refills: 1 | Status: SHIPPED | OUTPATIENT
Start: 2025-05-27

## 2025-05-27 RX ORDER — CLINDAMYCIN PHOSPHATE 900 MG/50ML
INJECTION, SOLUTION INTRAVENOUS
Status: DISCONTINUED | OUTPATIENT
Start: 2025-05-27 | End: 2025-05-27

## 2025-05-27 RX ORDER — SODIUM CHLORIDE, SODIUM LACTATE, POTASSIUM CHLORIDE, CALCIUM CHLORIDE 600; 310; 30; 20 MG/100ML; MG/100ML; MG/100ML; MG/100ML
INJECTION, SOLUTION INTRAVENOUS CONTINUOUS
Status: DISCONTINUED | OUTPATIENT
Start: 2025-05-27 | End: 2025-05-27 | Stop reason: HOSPADM

## 2025-05-27 RX ORDER — ONDANSETRON HYDROCHLORIDE 2 MG/ML
INJECTION, SOLUTION INTRAVENOUS
Status: DISCONTINUED | OUTPATIENT
Start: 2025-05-27 | End: 2025-05-27

## 2025-05-27 RX ORDER — OXYCODONE HYDROCHLORIDE 5 MG/1
5 TABLET ORAL
Status: DISCONTINUED | OUTPATIENT
Start: 2025-05-27 | End: 2025-05-27 | Stop reason: HOSPADM

## 2025-05-27 RX ORDER — PROPOFOL 10 MG/ML
VIAL (ML) INTRAVENOUS
Status: DISCONTINUED | OUTPATIENT
Start: 2025-05-27 | End: 2025-05-27

## 2025-05-27 RX ORDER — OXYCODONE HYDROCHLORIDE 5 MG/1
5 TABLET ORAL ONCE
Refills: 0 | Status: COMPLETED | OUTPATIENT
Start: 2025-05-27 | End: 2025-05-27

## 2025-05-27 RX ORDER — ALBUTEROL SULFATE 2.5 MG/.5ML
2.5 SOLUTION RESPIRATORY (INHALATION)
Status: COMPLETED | OUTPATIENT
Start: 2025-05-27 | End: 2025-05-27

## 2025-05-27 RX ORDER — LIDOCAINE HYDROCHLORIDE 10 MG/ML
0.5 INJECTION, SOLUTION EPIDURAL; INFILTRATION; INTRACAUDAL; PERINEURAL ONCE
Status: DISCONTINUED | OUTPATIENT
Start: 2025-05-27 | End: 2025-05-27 | Stop reason: HOSPADM

## 2025-05-27 RX ORDER — ACETAMINOPHEN 500 MG
1000 TABLET ORAL
Status: COMPLETED | OUTPATIENT
Start: 2025-05-27 | End: 2025-05-27

## 2025-05-27 RX ORDER — FENTANYL CITRATE 50 UG/ML
INJECTION, SOLUTION INTRAMUSCULAR; INTRAVENOUS
Status: DISCONTINUED | OUTPATIENT
Start: 2025-05-27 | End: 2025-05-27

## 2025-05-27 RX ORDER — DEXAMETHASONE SODIUM PHOSPHATE 4 MG/ML
INJECTION, SOLUTION INTRA-ARTICULAR; INTRALESIONAL; INTRAMUSCULAR; INTRAVENOUS; SOFT TISSUE
Status: DISCONTINUED | OUTPATIENT
Start: 2025-05-27 | End: 2025-05-27

## 2025-05-27 RX ORDER — MEPERIDINE HYDROCHLORIDE 25 MG/ML
12.5 INJECTION INTRAMUSCULAR; INTRAVENOUS; SUBCUTANEOUS ONCE AS NEEDED
Status: DISCONTINUED | OUTPATIENT
Start: 2025-05-27 | End: 2025-05-27 | Stop reason: HOSPADM

## 2025-05-27 RX ORDER — ACETAMINOPHEN 10 MG/ML
1000 INJECTION, SOLUTION INTRAVENOUS ONCE
Status: COMPLETED | OUTPATIENT
Start: 2025-05-27 | End: 2025-05-27

## 2025-05-27 RX ORDER — HYDROMORPHONE HYDROCHLORIDE 2 MG/ML
0.4 INJECTION, SOLUTION INTRAMUSCULAR; INTRAVENOUS; SUBCUTANEOUS EVERY 5 MIN PRN
Status: DISCONTINUED | OUTPATIENT
Start: 2025-05-27 | End: 2025-05-27 | Stop reason: HOSPADM

## 2025-05-27 RX ORDER — ROCURONIUM BROMIDE 10 MG/ML
INJECTION, SOLUTION INTRAVENOUS
Status: DISCONTINUED | OUTPATIENT
Start: 2025-05-27 | End: 2025-05-27

## 2025-05-27 RX ORDER — DOCUSATE SODIUM 100 MG/1
200 CAPSULE, LIQUID FILLED ORAL 2 TIMES DAILY PRN
Qty: 60 CAPSULE | Refills: 0 | Status: SHIPPED | OUTPATIENT
Start: 2025-05-27

## 2025-05-27 RX ORDER — LIDOCAINE HYDROCHLORIDE 20 MG/ML
INJECTION INTRAVENOUS
Status: DISCONTINUED | OUTPATIENT
Start: 2025-05-27 | End: 2025-05-27

## 2025-05-27 RX ORDER — KETOROLAC TROMETHAMINE 30 MG/ML
INJECTION, SOLUTION INTRAMUSCULAR; INTRAVENOUS
Status: DISCONTINUED | OUTPATIENT
Start: 2025-05-27 | End: 2025-05-27

## 2025-05-27 RX ORDER — SODIUM CHLORIDE 9 MG/ML
INJECTION, SOLUTION INTRAVENOUS CONTINUOUS
Status: DISCONTINUED | OUTPATIENT
Start: 2025-05-27 | End: 2025-05-27 | Stop reason: HOSPADM

## 2025-05-27 RX ORDER — PROCHLORPERAZINE EDISYLATE 5 MG/ML
5 INJECTION INTRAMUSCULAR; INTRAVENOUS ONCE
Status: COMPLETED | OUTPATIENT
Start: 2025-05-27 | End: 2025-05-27

## 2025-05-27 RX ORDER — GLUCAGON 1 MG
1 KIT INJECTION
Status: DISCONTINUED | OUTPATIENT
Start: 2025-05-27 | End: 2025-05-27 | Stop reason: HOSPADM

## 2025-05-27 RX ORDER — CYCLOBENZAPRINE HCL 5 MG
5 TABLET ORAL ONCE AS NEEDED
Status: COMPLETED | OUTPATIENT
Start: 2025-05-27 | End: 2025-05-27

## 2025-05-27 RX ORDER — KETOROLAC TROMETHAMINE 10 MG/1
10 TABLET, FILM COATED ORAL EVERY 6 HOURS
Qty: 20 TABLET | Refills: 0 | Status: SHIPPED | OUTPATIENT
Start: 2025-05-27 | End: 2025-06-01

## 2025-05-27 RX ORDER — FAMOTIDINE 20 MG/1
20 TABLET, FILM COATED ORAL
Status: COMPLETED | OUTPATIENT
Start: 2025-05-27 | End: 2025-05-27

## 2025-05-27 RX ORDER — SODIUM CHLORIDE 0.9 % (FLUSH) 0.9 %
3 SYRINGE (ML) INJECTION
Status: DISCONTINUED | OUTPATIENT
Start: 2025-05-27 | End: 2025-05-27 | Stop reason: HOSPADM

## 2025-05-27 RX ORDER — MIDAZOLAM HYDROCHLORIDE 1 MG/ML
INJECTION INTRAMUSCULAR; INTRAVENOUS
Status: DISCONTINUED | OUTPATIENT
Start: 2025-05-27 | End: 2025-05-27

## 2025-05-27 RX ORDER — ONDANSETRON HYDROCHLORIDE 2 MG/ML
4 INJECTION, SOLUTION INTRAVENOUS DAILY PRN
Status: DISCONTINUED | OUTPATIENT
Start: 2025-05-27 | End: 2025-05-27 | Stop reason: HOSPADM

## 2025-05-27 RX ORDER — OXYCODONE HYDROCHLORIDE 10 MG/1
10 TABLET ORAL EVERY 4 HOURS PRN
Qty: 15 TABLET | Refills: 0 | Status: SHIPPED | OUTPATIENT
Start: 2025-05-27 | End: 2025-06-02 | Stop reason: SDUPTHER

## 2025-05-27 RX ORDER — BUPIVACAINE HYDROCHLORIDE 2.5 MG/ML
INJECTION, SOLUTION EPIDURAL; INFILTRATION; INTRACAUDAL; PERINEURAL
Status: DISCONTINUED | OUTPATIENT
Start: 2025-05-27 | End: 2025-05-27 | Stop reason: HOSPADM

## 2025-05-27 RX ORDER — HYDROMORPHONE HYDROCHLORIDE 2 MG/ML
INJECTION, SOLUTION INTRAMUSCULAR; INTRAVENOUS; SUBCUTANEOUS
Status: DISCONTINUED | OUTPATIENT
Start: 2025-05-27 | End: 2025-05-27

## 2025-05-27 RX ADMIN — LIDOCAINE HYDROCHLORIDE 100 MG: 20 INJECTION, SOLUTION INTRAVENOUS at 11:05

## 2025-05-27 RX ADMIN — OXYCODONE HYDROCHLORIDE 5 MG: 5 TABLET ORAL at 06:05

## 2025-05-27 RX ADMIN — ACETAMINOPHEN 1000 MG: 10 INJECTION, SOLUTION INTRAVENOUS at 04:05

## 2025-05-27 RX ADMIN — ROCURONIUM BROMIDE 50 MG: 10 SOLUTION INTRAVENOUS at 11:05

## 2025-05-27 RX ADMIN — ONDANSETRON HYDROCHLORIDE 4 MG: 2 INJECTION INTRAMUSCULAR; INTRAVENOUS at 02:05

## 2025-05-27 RX ADMIN — FENTANYL CITRATE 100 MCG: 50 INJECTION, SOLUTION INTRAMUSCULAR; INTRAVENOUS at 11:05

## 2025-05-27 RX ADMIN — KETOROLAC TROMETHAMINE 15 MG: 30 INJECTION, SOLUTION INTRAMUSCULAR; INTRAVENOUS at 02:05

## 2025-05-27 RX ADMIN — MIDAZOLAM HYDROCHLORIDE 2 MG: 1 INJECTION INTRAMUSCULAR; INTRAVENOUS at 09:05

## 2025-05-27 RX ADMIN — CYCLOBENZAPRINE HYDROCHLORIDE 5 MG: 5 TABLET, FILM COATED ORAL at 04:05

## 2025-05-27 RX ADMIN — ACETAMINOPHEN 1000 MG: 500 TABLET, FILM COATED ORAL at 08:05

## 2025-05-27 RX ADMIN — ALBUTEROL SULFATE 2.5 MG: 2.5 SOLUTION RESPIRATORY (INHALATION) at 09:05

## 2025-05-27 RX ADMIN — HYDROMORPHONE HYDROCHLORIDE 0.4 MG: 2 INJECTION, SOLUTION INTRAMUSCULAR; INTRAVENOUS; SUBCUTANEOUS at 03:05

## 2025-05-27 RX ADMIN — PROPOFOL 160 MG: 10 INJECTION, EMULSION INTRAVENOUS at 11:05

## 2025-05-27 RX ADMIN — ROCURONIUM BROMIDE 30 MG: 10 SOLUTION INTRAVENOUS at 12:05

## 2025-05-27 RX ADMIN — CARBOXYMETHYLCELLULOSE SODIUM 2 DROP: 2.5 SOLUTION/ DROPS OPHTHALMIC at 11:05

## 2025-05-27 RX ADMIN — PROCHLORPERAZINE EDISYLATE 5 MG: 5 INJECTION INTRAMUSCULAR; INTRAVENOUS at 04:05

## 2025-05-27 RX ADMIN — GENTAMICIN SULFATE 297 MG: 40 INJECTION, SOLUTION INTRAMUSCULAR; INTRAVENOUS at 11:05

## 2025-05-27 RX ADMIN — DEXAMETHASONE SODIUM PHOSPHATE 8 MG: 4 INJECTION, SOLUTION INTRAMUSCULAR; INTRAVENOUS at 11:05

## 2025-05-27 RX ADMIN — OXYCODONE 5 MG: 5 TABLET ORAL at 03:05

## 2025-05-27 RX ADMIN — HYDROMORPHONE HYDROCHLORIDE 0.4 MG: 2 INJECTION, SOLUTION INTRAMUSCULAR; INTRAVENOUS; SUBCUTANEOUS at 04:05

## 2025-05-27 RX ADMIN — OXYCODONE HYDROCHLORIDE 5 MG: 5 TABLET ORAL at 04:05

## 2025-05-27 RX ADMIN — FAMOTIDINE 20 MG: 20 TABLET, FILM COATED ORAL at 08:05

## 2025-05-27 RX ADMIN — ROCURONIUM BROMIDE 20 MG: 10 SOLUTION INTRAVENOUS at 12:05

## 2025-05-27 RX ADMIN — SUGAMMADEX 400 MG: 100 INJECTION, SOLUTION INTRAVENOUS at 02:05

## 2025-05-27 RX ADMIN — CLINDAMYCIN PHOSPHATE 900 MG: 18 INJECTION, SOLUTION INTRAVENOUS at 11:05

## 2025-05-27 RX ADMIN — HYDROMORPHONE HYDROCHLORIDE 0.7 MG: 2 INJECTION INTRAMUSCULAR; INTRAVENOUS; SUBCUTANEOUS at 03:05

## 2025-05-27 RX ADMIN — SODIUM CHLORIDE, SODIUM LACTATE, POTASSIUM CHLORIDE, AND CALCIUM CHLORIDE: .6; .31; .03; .02 INJECTION, SOLUTION INTRAVENOUS at 09:05

## 2025-05-27 RX ADMIN — HYDROMORPHONE HYDROCHLORIDE 0.5 MG: 2 INJECTION INTRAMUSCULAR; INTRAVENOUS; SUBCUTANEOUS at 02:05

## 2025-05-27 NOTE — ANESTHESIA PROCEDURE NOTES
Intubation    Date/Time: 5/27/2025 11:37 AM    Performed by: Kelly Carreno CRNA  Authorized by: Dickson Cervantes MD    Intubation:     Induction:  Intravenous    Intubated:  Postinduction    Mask Ventilation:  Easy with oral airway    Attempts:  1    Attempted By:  CRNA    Method of Intubation:  Video laryngoscopy    Blade:  King 3    Laryngeal View Grade: Grade I - full view of cords      Difficult Airway Encountered?: No      Complications:  None    Airway Device:  Oral endotracheal tube    Airway Device Size:  7.5    Style/Cuff Inflation:  Cuffed (inflated to minimal occlusive pressure)    Tube secured:  22    Secured at:  The lips    Placement Verified By:  Capnometry    Complicating Factors:  Obesity (hyper mobile head noted by pt/positioned head to comfort  with pt)    Findings Post-Intubation:  BS equal bilateral and atraumatic/condition of teeth unchanged

## 2025-05-27 NOTE — ANESTHESIA POSTPROCEDURE EVALUATION
Anesthesia Post Evaluation    Patient: Justine Akins    Procedure(s) Performed: Procedure(s) (LRB):  XI ROBOTIC HYSTERECTOMY (N/A)  ROBOTIC SALPINGECTOMY (Right)  ROBOTIC URETEROLYSIS (Bilateral)  ROBOTIC CYSTECTOMY, OVARIAN (Bilateral)  ROBOTIC ABLATION OR EXCISION,ENDOMETRIOSIS (Bilateral)    Final Anesthesia Type: general      Patient location during evaluation: PACU  Patient participation: Yes- Able to Participate  Level of consciousness: awake and alert  Post-procedure vital signs: reviewed and stable  Pain management: adequate  Airway patency: patent    PONV status at discharge: No PONV  Anesthetic complications: no      Cardiovascular status: blood pressure returned to baseline  Respiratory status: unassisted  Hydration status: euvolemic  Follow-up not needed.          Vitals Value Taken Time   /84 05/27/25 17:02   Temp 36.8 °C (98.2 °F) 05/27/25 15:05   Pulse 89 05/27/25 17:09   Resp 16 05/27/25 17:00   SpO2 97 % 05/27/25 17:09   Vitals shown include unfiled device data.      Event Time   Out of Recovery 17:13:28         Pain/Graham Score: Pain Rating Prior to Med Admin: 7 (5/27/2025  4:51 PM)  Pain Rating Post Med Admin: 10 (5/27/2025  4:34 PM)  Graham Score: 9 (5/27/2025  4:34 PM)           

## 2025-05-27 NOTE — TRANSFER OF CARE
"Anesthesia Transfer of Care Note    Patient: Justine Akins    Procedure(s) Performed: Procedure(s) (LRB):  XI ROBOTIC HYSTERECTOMY (N/A)  ROBOTIC SALPINGECTOMY (Right)  ROBOTIC URETEROLYSIS (Bilateral)  ROBOTIC CYSTECTOMY, OVARIAN (Bilateral)  ROBOTIC ABLATION OR EXCISION,ENDOMETRIOSIS (Bilateral)    Patient location: PACU    Anesthesia Type: general    Transport from OR: Transported from OR on room air with adequate spontaneous ventilation    Post pain: adequate analgesia    Post assessment: no apparent anesthetic complications    Post vital signs: stable    Level of consciousness: sedated    Nausea/Vomiting: no nausea/vomiting    Complications: none    Transfer of care protocol was followed      Last vitals: Visit Vitals  BP (!) 149/94 (BP Location: Right arm, Patient Position: Lying)   Pulse 80   Temp 37.1 °C (98.8 °F) (Temporal)   Resp 20   Ht 5' 1" (1.549 m)   Wt 76.7 kg (169 lb)   LMP 05/07/2025 (Approximate)   SpO2 98%   Breastfeeding No   BMI 31.93 kg/m²     "

## 2025-05-29 ENCOUNTER — TELEPHONE (OUTPATIENT)
Dept: OBSTETRICS AND GYNECOLOGY | Facility: CLINIC | Age: 35
End: 2025-05-29
Payer: COMMERCIAL

## 2025-05-29 LAB
ESTRIOL SERPL-MCNC: NORMAL NG/ML
ESTROGEN SERPL-MCNC: NORMAL PG/ML
INSULIN SERPL-ACNC: NORMAL U[IU]/ML
LAB AP CLINICAL INFORMATION: NORMAL
LAB AP DIAGNOSIS CATEGORY: NORMAL
LAB AP GROSS DESCRIPTION: NORMAL
LAB AP PERFORMING LOCATION(S): NORMAL
LAB AP REPORT FOOTNOTES: NORMAL

## 2025-05-29 NOTE — TELEPHONE ENCOUNTER
Post op scheduled 7/1 @ 3pm. Patient reports doing well passing gas, reports right side discomfort. Patient alternating, Toradol and Oxycodone. Patient reports taking Hydrocodone daily, for another condition. Patient reports taking colace twice daily. Patient will start this evening she will take half Oxycodone for 5mg, reports pill is scored.

## 2025-05-30 ENCOUNTER — PATIENT MESSAGE (OUTPATIENT)
Dept: OBSTETRICS AND GYNECOLOGY | Facility: CLINIC | Age: 35
End: 2025-05-30
Payer: COMMERCIAL

## 2025-06-02 ENCOUNTER — TELEPHONE (OUTPATIENT)
Dept: OBSTETRICS AND GYNECOLOGY | Facility: CLINIC | Age: 35
End: 2025-06-02
Payer: COMMERCIAL

## 2025-06-02 DIAGNOSIS — Z98.890 S/P ROBOT-ASSISTED SURGICAL PROCEDURE: ICD-10-CM

## 2025-06-02 RX ORDER — OXYCODONE HYDROCHLORIDE 10 MG/1
10 TABLET ORAL EVERY 6 HOURS PRN
Qty: 10 TABLET | Refills: 0 | Status: SHIPPED | OUTPATIENT
Start: 2025-06-02

## 2025-06-03 ENCOUNTER — PATIENT MESSAGE (OUTPATIENT)
Dept: OBSTETRICS AND GYNECOLOGY | Facility: CLINIC | Age: 35
End: 2025-06-03
Payer: COMMERCIAL

## 2025-07-01 ENCOUNTER — OFFICE VISIT (OUTPATIENT)
Dept: OBSTETRICS AND GYNECOLOGY | Facility: CLINIC | Age: 35
End: 2025-07-01
Payer: COMMERCIAL

## 2025-07-01 VITALS
BODY MASS INDEX: 31.75 KG/M2 | WEIGHT: 168.19 LBS | HEIGHT: 61 IN | DIASTOLIC BLOOD PRESSURE: 92 MMHG | SYSTOLIC BLOOD PRESSURE: 132 MMHG

## 2025-07-01 DIAGNOSIS — Z09 SURGERY FOLLOW-UP EXAMINATION: Primary | ICD-10-CM

## 2025-07-01 DIAGNOSIS — Z98.890 S/P ROBOT-ASSISTED SURGICAL PROCEDURE: ICD-10-CM

## 2025-07-01 PROBLEM — N80.9 ENDOMETRIOSIS: Status: RESOLVED | Noted: 2018-07-31 | Resolved: 2025-07-01

## 2025-07-01 PROBLEM — N80.129 ENDOMETRIOMA OF OVARY: Status: RESOLVED | Noted: 2018-07-31 | Resolved: 2025-07-01

## 2025-07-01 PROCEDURE — 99024 POSTOP FOLLOW-UP VISIT: CPT | Mod: S$GLB,,, | Performed by: OBSTETRICS & GYNECOLOGY

## 2025-07-01 PROCEDURE — 99999 PR PBB SHADOW E&M-EST. PATIENT-LVL III: CPT | Mod: PBBFAC,,, | Performed by: OBSTETRICS & GYNECOLOGY

## 2025-07-01 RX ORDER — HYDROCODONE BITARTRATE AND ACETAMINOPHEN 5; 325 MG/1; MG/1
1 TABLET ORAL 2 TIMES DAILY
COMMUNITY
Start: 2025-06-13

## 2025-07-01 NOTE — PROGRESS NOTES
Postop visit    Justine Akins is a 34 y.o.   patient who is status post robotic assisted hysterectomy with bilateral salpingectomy/right ovarian cystectomy/resection of endometriosis/lysis of severe adhesions on 2025.  Patient is doing well postoperatively.    No bowel or bladder complaints.  No fever.  Patient reports significant improvement in her bladder functioning.  Patient reports significant improvement in her quality life from decreased pain.    The pathology report was reviewed with patient    Past Medical History:   Diagnosis Date    Asthma     environmental related    Jesse-Danlos syndrome     Endometrioma     Endometriosis     Hyperlipidemia     Hypertension     MTHFR mutation     Ovarian cyst     POTS (postural orthostatic tachycardia syndrome)     Pulmonary embolism     Wears prescription eyeglasses      Past Surgical History:   Procedure Laterality Date    ANKLE SURGERY Right     reconstruction after avulsion and fracture    APPENDECTOMY  5/15/24    With endometrioma removal    CHROMOTUBATION OF FALLOPIAN TUBE N/A 05/15/2023    Procedure: CHROMOTUBATION, OVIDUCT;  Surgeon: Israel Bernstein MD;  Location: [facility] OR;  Service: OB/GYN;  Laterality: N/A;    DIAGNOSTIC LAPAROSCOPY Right 2018    Procedure: LAPAROSCOPY, DIAGNOSTIC;  Surgeon: Vane Baez MD;  Location: [facility] OR;  Service: OB/GYN;  Laterality: Right;    DIAGNOSTIC LAPAROSCOPY N/A 05/15/2023    Procedure: LAPAROSCOPY, DIAGNOSTIC;  Surgeon: Israel Bernstein MD;  Location: [facility] OR;  Service: OB/GYN;  Laterality: N/A;    FRACTURE SURGERY Right     r ankle    JOINT REPLACEMENT Right     knee    KNEE ARTHROSCOPY Right /    KNEE ARTHROSCOPY W/ MENISCAL REPAIR Right     Replacement of meniscus and bone grafts    LAPAROSCOPIC APPENDECTOMY N/A 05/15/2023    Procedure: APPENDECTOMY, LAPAROSCOPIC;  Surgeon: Israel Bernstein MD;  Location: [facility];  Service: OB/GYN;  Laterality: N/A;  performed by Dr. Dias     LAPAROSCOPIC LYSIS OF ADHESIONS N/A 05/15/2023    Procedure: LYSIS, ADHESIONS, LAPAROSCOPIC;  Surgeon: Israel Bernstein MD;  Location: [facility];  Service: OB/GYN;  Laterality: N/A;    LAPAROSCOPIC SALPINGO-OOPHORECTOMY Right 07/31/2018    Procedure: SALPINGO-OOPHORECTOMY, LAPAROSCOPIC;  Surgeon: Vane Baez MD;  Location: [facility];  Service: OB/GYN;  Laterality: Right;    ROBOT-ASSISTED LAPAROSCOPIC ABDOMINAL HYSTERECTOMY USING DA NICKI XI N/A 5/27/2025    Procedure: XI ROBOTIC HYSTERECTOMY;  Surgeon: Shiva Cho IV, MD;  Location: [facility];  Service: OB/GYN;  Laterality: N/A;    ROBOT-ASSISTED LAPAROSCOPIC EXCISION OF CYST OF OVARY Bilateral 5/27/2025    Procedure: ROBOTIC CYSTECTOMY, OVARIAN;  Surgeon: Shiva Cho IV, MD;  Location: [facility];  Service: OB/GYN;  Laterality: Bilateral;    ROBOT-ASSISTED LAPAROSCOPIC URETEROLYSIS Bilateral 5/27/2025    Procedure: ROBOTIC URETEROLYSIS;  Surgeon: Shiva Cho IV, MD;  Location: [facility];  Service: OB/GYN;  Laterality: Bilateral;    ROBOT-ASSISTED SALPINGECTOMY Right 5/27/2025    Procedure: ROBOTIC SALPINGECTOMY;  Surgeon: Shiva Cho IV, MD;  Location: [facility];  Service: OB/GYN;  Laterality: Right;    ROBOTIC ABLATION OR EXCISION, ENDOMETRIOSIS Bilateral 5/27/2025    Procedure: ROBOTIC ABLATION OR EXCISION,ENDOMETRIOSIS;  Surgeon: Shiva Cho IV, MD;  Location: [facility];  Service: OB/GYN;  Laterality: Bilateral;    SURGICAL REMOVAL OF ENDOMETRIOMA Bilateral 05/15/2023    Procedure: EXCISION, ENDOMETRIOMA;  Surgeon: Israel Bernstein MD;  Location: [facility];  Service: OB/GYN;  Laterality: Bilateral;    TONSILLECTOMY  1994     Family History   Problem Relation Name Age of Onset    Diabetes Mother Mother     Ovarian cancer Mother Mother     Fibromyalgia Mother Mother     Arthritis Mother Mother     Asthma Mother Mother     Miscarriages / Stillbirths Mother Mother     Heart disease Father Father     Diabetes Father Father     Heart attack Father  "Father     Hyperlipidemia Father Father     Glaucoma Maternal Grandmother      Macular degeneration Neg Hx       Social History[1]  OB History    Para Term  AB Living   0 0 0 0 0 0   SAB IAB Ectopic Multiple Live Births   0 0 0 0 0       BP (!) 132/92   Ht 5' 1" (1.549 m)   Wt 76.3 kg (168 lb 3.4 oz)   LMP 2025 (Approximate)   BMI 31.78 kg/m²     ROS:  GENERAL: No fever, chills, fatigability or weight loss.  VULVAR: No pain, no lesions and no itching.  VAGINAL: No relaxation, no itching, no discharge, no abnormal bleeding and no lesions.  ABDOMEN: No abdominal pain. Denies nausea. Denies vomiting. No diarrhea. No constipation  BREAST: Denies pain. No lumps. No discharge.  URINARY: No incontinence, no nocturia, no frequency and no dysuria.  CARDIOVASCULAR: No chest pain. No shortness of breath. No leg cramps.  NEUROLOGICAL: No headaches. No vision changes.    PE:   APPEARANCE: Well nourished, well developed, in no acute distress.  GENITOURINARY:  Vulva: No lesions. No erythema nor excoriations noted,Normal female genital architecture.  Urethral Meatus: Normal size and location, no lesions, no prolapse.  Urethra: No masses, tenderness, prolapse or scarring.  Vagina: No significant cystocele or rectocele.  Vaginal cuff healing well.  Scant blood-tinged discharge at vaginal cuff.  No mucosal disruption and dehiscence noted.  Cervix: Absent  Uterus: Absent  Adnexa: No masses, tenderness or CDS nodularity.  Anus Perineum: No lesions, no relaxation, no external hemorrhoids.  Abdomen: No masses, tenderness, hernia or ascites, no hepatosplenomegaly.  Davinci port sites healed.  Skin: No rashes, lesions, ulcers, acne, hirsutism.  Peripheral/lower extremities: No edema, erythema or tenderness.  Lymphatic: No groin nodes palp.  Mental Status: Alert, oriented x 3, normal affect and mood        ICD-10-CM ICD-9-CM    1. Surgery follow-up examination  Z09 V67.00       2. S/P robot-assisted hysterectomy with " lysis of severe adhesions/ovarian cystectomy/resection of endometriosis  Z98.890 V45.89         Plan:  Patient cleared for routine activity/exercise.    Continue pelvic rest x 10 weeks.  Patient instructed to contact office if blood-tinged discharge continues beyond 8 weeks.  Patient verbalized understanding.    Annual exam in one year - Dr. Israel Bernstein    Patient instructed to monitor for menopausal symptoms/hot flashes.    Keep regular PCP follow-up     Shiva Cho IV, MD             [1]   Social History  Tobacco Use    Smoking status: Never     Passive exposure: Never    Smokeless tobacco: Never   Vaping Use    Vaping status: Never Used   Substance Use Topics    Alcohol use: No    Drug use: Yes     Types: Hydrocodone     Comment: rx

## 2025-08-18 PROBLEM — Z98.890 S/P ROBOT-ASSISTED SURGICAL PROCEDURE: Status: RESOLVED | Noted: 2025-05-27 | Resolved: 2025-08-18

## 2025-08-25 ENCOUNTER — TELEPHONE (OUTPATIENT)
Dept: OBSTETRICS AND GYNECOLOGY | Facility: CLINIC | Age: 35
End: 2025-08-25
Payer: COMMERCIAL

## (undated) DEVICE — GLOVE BIOGEL SKINSENSE PI 8.0

## (undated) DEVICE — APPLICATOR SURGICEL ENDOSCOPIC

## (undated) DEVICE — SYS SEE SHARP SCP ANTIFG LNG

## (undated) DEVICE — SOL IRR SOD CHL .9% POUR

## (undated) DEVICE — PACK LAPAROSCOPY BAPTIST

## (undated) DEVICE — SUT EASE CROSSBOW CLSR SYS

## (undated) DEVICE — IRRIGATOR ENDOSCOPY DISP.

## (undated) DEVICE — SUT MCRYL PLUS 4-0 PS2 27IN

## (undated) DEVICE — CART STAPLE FLEX ETX 3.5MM BLU

## (undated) DEVICE — MANIPULATOR VCARE PLUS 37MM LG

## (undated) DEVICE — TRAY DO THE ROBOT

## (undated) DEVICE — JELLY SURGILUBE 5GR

## (undated) DEVICE — SOL IRRI STRL WATER 1000ML

## (undated) DEVICE — PENCIL ELECTROSURG HOLST W/BLD

## (undated) DEVICE — TOWEL OR DISP STRL BLUE 4/PK

## (undated) DEVICE — CONTAINER SPEC OR STRL 4.5OZ

## (undated) DEVICE — COVER TIP CURVED SCISSORS XI

## (undated) DEVICE — SOL NORMAL USPCA 0.9%

## (undated) DEVICE — SET TRI-LUMEN FILTERED TUBE

## (undated) DEVICE — SEAL CANN UNIVERSAL 5-12MM

## (undated) DEVICE — NDL INSUFFLATION VERRES 120MM

## (undated) DEVICE — STAPLER INT LINEAR ARTC 3.5-45

## (undated) DEVICE — SUT VICRYL CTD 2-0 GI 27 SH

## (undated) DEVICE — SUT VICRYL 0 27 CT-2

## (undated) DEVICE — BAG TISSUE RETRIEVAL 225ML

## (undated) DEVICE — HEMOSTAT SURGICEL PWD 3G

## (undated) DEVICE — SOL POVIDONE SCRUB IODINE 4 OZ

## (undated) DEVICE — TIP RUMI BLUE DISPOSABLE 5/BX

## (undated) DEVICE — NDL HYPO REG 25G X 1 1/2

## (undated) DEVICE — TROCAR KII FIOS 5MM X 100MM

## (undated) DEVICE — TIP RUMI KOH-EFFICIENT 3.0

## (undated) DEVICE — GLOVE BIOGEL SKINSENSE PI 6.5

## (undated) DEVICE — SUT VICRYL PLUS 0 CT1 36IN

## (undated) DEVICE — SOL POVIDONE PREP IODINE 4 OZ

## (undated) DEVICE — GOWN POLY REINF BRTH SLV XL

## (undated) DEVICE — ELECTRODE REM PLYHSV RETURN 9

## (undated) DEVICE — SHEARS HARMONIC CRVD TIP 36CM

## (undated) DEVICE — SUT V-LOC 180 GRN GS-21 12IN

## (undated) DEVICE — GOWN NONREINF SET-IN SLV XL

## (undated) DEVICE — DRAPE COLUMN DAVINCI XI

## (undated) DEVICE — INSERT CUSHIONPRONE VIEW LARGE

## (undated) DEVICE — KIT WING PAD POSITIONING

## (undated) DEVICE — PAD PREP CUFFED NS 24X48IN

## (undated) DEVICE — VIDEO RENTAL SERVICE

## (undated) DEVICE — OBTURATOR BLADELESS 8MM XI CLR

## (undated) DEVICE — TROCAR ENDO KII FIOS 12X100MM

## (undated) DEVICE — SOL ELECTROLUBE ANTI-STIC

## (undated) DEVICE — SUT VICRYL+ 27 UR-6 VIOL

## (undated) DEVICE — SCISSOR 5MMX35CM DIRECT DRIVE

## (undated) DEVICE — GLOVE SENSICARE PI MICRO 7.5

## (undated) DEVICE — DRAPE ARM DAVINCI XI

## (undated) DEVICE — ELECTRODE NEEDLE 1IN